# Patient Record
Sex: FEMALE | Race: BLACK OR AFRICAN AMERICAN | NOT HISPANIC OR LATINO | URBAN - METROPOLITAN AREA
[De-identification: names, ages, dates, MRNs, and addresses within clinical notes are randomized per-mention and may not be internally consistent; named-entity substitution may affect disease eponyms.]

---

## 2017-05-23 ENCOUNTER — OUTPATIENT (OUTPATIENT)
Dept: OUTPATIENT SERVICES | Facility: HOSPITAL | Age: 82
LOS: 1 days | End: 2017-05-23
Payer: COMMERCIAL

## 2017-05-23 DIAGNOSIS — Z96.659 PRESENCE OF UNSPECIFIED ARTIFICIAL KNEE JOINT: Chronic | ICD-10-CM

## 2017-05-23 DIAGNOSIS — Z98.51 TUBAL LIGATION STATUS: Chronic | ICD-10-CM

## 2017-05-23 DIAGNOSIS — R06.02 SHORTNESS OF BREATH: ICD-10-CM

## 2017-05-23 PROCEDURE — A9502: CPT

## 2017-05-23 PROCEDURE — 93017 CV STRESS TEST TRACING ONLY: CPT

## 2017-05-23 PROCEDURE — 78452 HT MUSCLE IMAGE SPECT MULT: CPT

## 2018-06-04 ENCOUNTER — APPOINTMENT (OUTPATIENT)
Dept: VASCULAR SURGERY | Facility: CLINIC | Age: 83
End: 2018-06-04
Payer: MEDICARE

## 2018-06-04 VITALS
TEMPERATURE: 98.5 F | HEART RATE: 61 BPM | DIASTOLIC BLOOD PRESSURE: 76 MMHG | WEIGHT: 179 LBS | SYSTOLIC BLOOD PRESSURE: 158 MMHG | BODY MASS INDEX: 30.56 KG/M2 | OXYGEN SATURATION: 98 % | HEIGHT: 64 IN

## 2018-06-04 PROCEDURE — 99202 OFFICE O/P NEW SF 15 MIN: CPT

## 2018-06-04 PROCEDURE — 93970 EXTREMITY STUDY: CPT

## 2018-07-03 ENCOUNTER — RESULT REVIEW (OUTPATIENT)
Age: 83
End: 2018-07-03

## 2018-09-10 ENCOUNTER — APPOINTMENT (OUTPATIENT)
Dept: VASCULAR SURGERY | Facility: CLINIC | Age: 83
End: 2018-09-10
Payer: MEDICARE

## 2018-09-10 VITALS — SYSTOLIC BLOOD PRESSURE: 174 MMHG | DIASTOLIC BLOOD PRESSURE: 77 MMHG

## 2018-09-10 VITALS
HEART RATE: 61 BPM | DIASTOLIC BLOOD PRESSURE: 83 MMHG | HEIGHT: 64 IN | OXYGEN SATURATION: 100 % | BODY MASS INDEX: 30.56 KG/M2 | TEMPERATURE: 97.9 F | WEIGHT: 179 LBS | SYSTOLIC BLOOD PRESSURE: 176 MMHG

## 2018-09-10 PROCEDURE — 99213 OFFICE O/P EST LOW 20 MIN: CPT

## 2018-09-10 PROCEDURE — 93970 EXTREMITY STUDY: CPT

## 2019-03-11 ENCOUNTER — APPOINTMENT (OUTPATIENT)
Dept: VASCULAR SURGERY | Facility: CLINIC | Age: 84
End: 2019-03-11

## 2019-04-08 ENCOUNTER — APPOINTMENT (OUTPATIENT)
Dept: VASCULAR SURGERY | Facility: CLINIC | Age: 84
End: 2019-04-08
Payer: MEDICARE

## 2019-04-08 VITALS
SYSTOLIC BLOOD PRESSURE: 166 MMHG | BODY MASS INDEX: 30.38 KG/M2 | TEMPERATURE: 98.1 F | WEIGHT: 177 LBS | HEART RATE: 77 BPM | DIASTOLIC BLOOD PRESSURE: 75 MMHG

## 2019-04-08 PROCEDURE — 99212 OFFICE O/P EST SF 10 MIN: CPT

## 2019-04-08 PROCEDURE — 93970 EXTREMITY STUDY: CPT

## 2019-07-23 ENCOUNTER — OUTPATIENT (OUTPATIENT)
Dept: OUTPATIENT SERVICES | Facility: HOSPITAL | Age: 84
LOS: 1 days | End: 2019-07-23
Payer: COMMERCIAL

## 2019-07-23 DIAGNOSIS — Z98.51 TUBAL LIGATION STATUS: Chronic | ICD-10-CM

## 2019-07-23 DIAGNOSIS — R06.09 OTHER FORMS OF DYSPNEA: ICD-10-CM

## 2019-07-23 DIAGNOSIS — Z96.659 PRESENCE OF UNSPECIFIED ARTIFICIAL KNEE JOINT: Chronic | ICD-10-CM

## 2019-07-23 PROCEDURE — A9502: CPT

## 2019-07-23 PROCEDURE — 78452 HT MUSCLE IMAGE SPECT MULT: CPT

## 2019-07-23 PROCEDURE — 93017 CV STRESS TEST TRACING ONLY: CPT

## 2020-10-31 ENCOUNTER — INPATIENT (INPATIENT)
Facility: HOSPITAL | Age: 85
LOS: 1 days | Discharge: HOME | DRG: 558 | End: 2020-11-02
Attending: STUDENT IN AN ORGANIZED HEALTH CARE EDUCATION/TRAINING PROGRAM | Admitting: STUDENT IN AN ORGANIZED HEALTH CARE EDUCATION/TRAINING PROGRAM
Payer: COMMERCIAL

## 2020-10-31 VITALS
OXYGEN SATURATION: 98 % | RESPIRATION RATE: 18 BRPM | DIASTOLIC BLOOD PRESSURE: 92 MMHG | TEMPERATURE: 97 F | HEIGHT: 65 IN | SYSTOLIC BLOOD PRESSURE: 174 MMHG | HEART RATE: 83 BPM

## 2020-10-31 DIAGNOSIS — Z98.51 TUBAL LIGATION STATUS: Chronic | ICD-10-CM

## 2020-10-31 DIAGNOSIS — Z96.659 PRESENCE OF UNSPECIFIED ARTIFICIAL KNEE JOINT: Chronic | ICD-10-CM

## 2020-10-31 LAB
ACETONE SERPL-MCNC: NEGATIVE — SIGNIFICANT CHANGE UP
ALBUMIN SERPL ELPH-MCNC: 2.9 G/DL — LOW (ref 3.5–5)
ALP SERPL-CCNC: 54 U/L — SIGNIFICANT CHANGE UP (ref 40–120)
ALT FLD-CCNC: 46 U/L DA — SIGNIFICANT CHANGE UP (ref 10–60)
ANION GAP SERPL CALC-SCNC: 5 MMOL/L — SIGNIFICANT CHANGE UP (ref 5–17)
APTT BLD: 36.9 SEC — HIGH (ref 27.5–35.5)
AST SERPL-CCNC: 51 U/L — HIGH (ref 10–40)
BASOPHILS # BLD AUTO: 0.01 K/UL — SIGNIFICANT CHANGE UP (ref 0–0.2)
BASOPHILS NFR BLD AUTO: 0.2 % — SIGNIFICANT CHANGE UP (ref 0–2)
BILIRUB SERPL-MCNC: 0.2 MG/DL — SIGNIFICANT CHANGE UP (ref 0.2–1.2)
BUN SERPL-MCNC: 24 MG/DL — HIGH (ref 7–18)
CALCIUM SERPL-MCNC: 9.1 MG/DL — SIGNIFICANT CHANGE UP (ref 8.4–10.5)
CHLORIDE SERPL-SCNC: 109 MMOL/L — HIGH (ref 96–108)
CK SERPL-CCNC: 1551 U/L — HIGH (ref 21–215)
CO2 SERPL-SCNC: 29 MMOL/L — SIGNIFICANT CHANGE UP (ref 22–31)
CREAT SERPL-MCNC: 1.06 MG/DL — SIGNIFICANT CHANGE UP (ref 0.5–1.3)
EOSINOPHIL # BLD AUTO: 0.02 K/UL — SIGNIFICANT CHANGE UP (ref 0–0.5)
EOSINOPHIL NFR BLD AUTO: 0.4 % — SIGNIFICANT CHANGE UP (ref 0–6)
GLUCOSE SERPL-MCNC: 144 MG/DL — HIGH (ref 70–99)
HCT VFR BLD CALC: 40.8 % — SIGNIFICANT CHANGE UP (ref 34.5–45)
HGB BLD-MCNC: 12.6 G/DL — SIGNIFICANT CHANGE UP (ref 11.5–15.5)
IMM GRANULOCYTES NFR BLD AUTO: 0.2 % — SIGNIFICANT CHANGE UP (ref 0–1.5)
INR BLD: 2.03 RATIO — HIGH (ref 0.88–1.16)
LYMPHOCYTES # BLD AUTO: 1.13 K/UL — SIGNIFICANT CHANGE UP (ref 1–3.3)
LYMPHOCYTES # BLD AUTO: 20.1 % — SIGNIFICANT CHANGE UP (ref 13–44)
MAGNESIUM SERPL-MCNC: 2.3 MG/DL — SIGNIFICANT CHANGE UP (ref 1.6–2.6)
MCHC RBC-ENTMCNC: 30 PG — SIGNIFICANT CHANGE UP (ref 27–34)
MCHC RBC-ENTMCNC: 30.9 GM/DL — LOW (ref 32–36)
MCV RBC AUTO: 97.1 FL — SIGNIFICANT CHANGE UP (ref 80–100)
MONOCYTES # BLD AUTO: 0.63 K/UL — SIGNIFICANT CHANGE UP (ref 0–0.9)
MONOCYTES NFR BLD AUTO: 11.2 % — SIGNIFICANT CHANGE UP (ref 2–14)
NEUTROPHILS # BLD AUTO: 3.83 K/UL — SIGNIFICANT CHANGE UP (ref 1.8–7.4)
NEUTROPHILS NFR BLD AUTO: 67.9 % — SIGNIFICANT CHANGE UP (ref 43–77)
NRBC # BLD: 0 /100 WBCS — SIGNIFICANT CHANGE UP (ref 0–0)
PLATELET # BLD AUTO: 181 K/UL — SIGNIFICANT CHANGE UP (ref 150–400)
POTASSIUM SERPL-MCNC: 4.2 MMOL/L — SIGNIFICANT CHANGE UP (ref 3.5–5.3)
POTASSIUM SERPL-SCNC: 4.2 MMOL/L — SIGNIFICANT CHANGE UP (ref 3.5–5.3)
PROT SERPL-MCNC: 6.9 G/DL — SIGNIFICANT CHANGE UP (ref 6–8.3)
PROTHROM AB SERPL-ACNC: 23.4 SEC — HIGH (ref 10.6–13.6)
RBC # BLD: 4.2 M/UL — SIGNIFICANT CHANGE UP (ref 3.8–5.2)
RBC # FLD: 13.6 % — SIGNIFICANT CHANGE UP (ref 10.3–14.5)
SODIUM SERPL-SCNC: 143 MMOL/L — SIGNIFICANT CHANGE UP (ref 135–145)
TROPONIN I SERPL-MCNC: 0.03 NG/ML — SIGNIFICANT CHANGE UP (ref 0–0.04)
WBC # BLD: 5.63 K/UL — SIGNIFICANT CHANGE UP (ref 3.8–10.5)
WBC # FLD AUTO: 5.63 K/UL — SIGNIFICANT CHANGE UP (ref 3.8–10.5)

## 2020-10-31 PROCEDURE — 74177 CT ABD & PELVIS W/CONTRAST: CPT | Mod: 26

## 2020-10-31 PROCEDURE — 73030 X-RAY EXAM OF SHOULDER: CPT | Mod: 26,LT

## 2020-10-31 PROCEDURE — 71260 CT THORAX DX C+: CPT | Mod: 26

## 2020-10-31 PROCEDURE — 72125 CT NECK SPINE W/O DYE: CPT | Mod: 26

## 2020-10-31 PROCEDURE — 70450 CT HEAD/BRAIN W/O DYE: CPT | Mod: 26

## 2020-10-31 RX ORDER — SODIUM CHLORIDE 9 MG/ML
1000 INJECTION INTRAMUSCULAR; INTRAVENOUS; SUBCUTANEOUS
Refills: 0 | Status: DISCONTINUED | OUTPATIENT
Start: 2020-10-31 | End: 2020-11-01

## 2020-10-31 RX ORDER — MORPHINE SULFATE 50 MG/1
2 CAPSULE, EXTENDED RELEASE ORAL ONCE
Refills: 0 | Status: DISCONTINUED | OUTPATIENT
Start: 2020-10-31 | End: 2020-10-31

## 2020-10-31 RX ORDER — SODIUM CHLORIDE 9 MG/ML
125 INJECTION INTRAMUSCULAR; INTRAVENOUS; SUBCUTANEOUS ONCE
Refills: 0 | Status: DISCONTINUED | OUTPATIENT
Start: 2020-10-31 | End: 2020-10-31

## 2020-10-31 RX ORDER — ONDANSETRON 8 MG/1
4 TABLET, FILM COATED ORAL ONCE
Refills: 0 | Status: COMPLETED | OUTPATIENT
Start: 2020-10-31 | End: 2020-10-31

## 2020-10-31 RX ADMIN — MORPHINE SULFATE 2 MILLIGRAM(S): 50 CAPSULE, EXTENDED RELEASE ORAL at 22:45

## 2020-10-31 RX ADMIN — MORPHINE SULFATE 2 MILLIGRAM(S): 50 CAPSULE, EXTENDED RELEASE ORAL at 22:23

## 2020-10-31 RX ADMIN — ONDANSETRON 4 MILLIGRAM(S): 8 TABLET, FILM COATED ORAL at 22:23

## 2020-10-31 NOTE — ED PROVIDER NOTE - OBJECTIVE STATEMENT
85 y.o female with a PMhx of DM, HTN, DVT on coumadin and a PSHx of a knee replacement reports to the ED s/p fall. Patient endorses she was sitting on her bed eating and when she went to get up she lost her balance and fell on her L side. Patient endorses pain to the L side of her back and neck . Patient endorses she cannot raise her L arm. Patient states she was able to walk with her bucio after the fall. Patient denies any preceding symptoms, dizzines sor any other acute complaints. NKDA 85 y.o female with a PMhx of DM, HTN, DVT on coumadin and a PSHx of a knee replacement reports to the ED s/p fall. Patient endorses she was sitting on her bed eating and when she went to get up she lost her balance and fell on her L side. Patient endorses pain to the L side of her back and neck . Patient endorses she cannot raise her L arm. Patient states she was able to walk with her bucio after the fall. Patient denies any preceding symptoms, dizziness, or any other acute complaints. NKDA

## 2020-10-31 NOTE — ED PROVIDER NOTE - CLINICAL SUMMARY MEDICAL DECISION MAKING FREE TEXT BOX
Patient s/p losing balance and falling now c.o left sided pain to back and neck, since patient is on coumadin, will get labs, ct and reassess Patient s/p losing balance and falling now c.o left sided pain to back and neck, abd pain, hip pain since patient is on coumadin, will get labs, ct and reassess

## 2020-10-31 NOTE — ED PROVIDER NOTE - CARE PLAN
Principal Discharge DX:	Rhabdomyolysis  Secondary Diagnosis:	Fall at home  Secondary Diagnosis:	Neck pain   Principal Discharge DX:	Rhabdomyolysis  Secondary Diagnosis:	Fall at home  Secondary Diagnosis:	Neck pain  Secondary Diagnosis:	Thyroid nodule  Secondary Diagnosis:	Renal lesion

## 2020-10-31 NOTE — ED PROVIDER NOTE - MUSCULOSKELETAL, MLM
Spine appears normal, range of motion is not limited, no muscle or joint tenderness Spine appears normal, Lt shoulder-range of motion is limited, tenderness to palp., Lt hip- tenderness, no deformity, no shortening, pulses intact

## 2020-11-01 DIAGNOSIS — M62.82 RHABDOMYOLYSIS: ICD-10-CM

## 2020-11-01 DIAGNOSIS — I82.409 ACUTE EMBOLISM AND THROMBOSIS OF UNSPECIFIED DEEP VEINS OF UNSPECIFIED LOWER EXTREMITY: ICD-10-CM
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
## 2020-11-01 DIAGNOSIS — Z29.9 ENCOUNTER FOR PROPHYLACTIC MEASURES, UNSPECIFIED: ICD-10-CM

## 2020-11-01 DIAGNOSIS — N28.9 DISORDER OF KIDNEY AND URETER, UNSPECIFIED: ICD-10-CM

## 2020-11-01 DIAGNOSIS — I10 ESSENTIAL (PRIMARY) HYPERTENSION: ICD-10-CM

## 2020-11-01 DIAGNOSIS — E04.1 NONTOXIC SINGLE THYROID NODULE: ICD-10-CM

## 2020-11-01 DIAGNOSIS — E11.9 TYPE 2 DIABETES MELLITUS WITHOUT COMPLICATIONS: ICD-10-CM

## 2020-11-01 LAB
A1C WITH ESTIMATED AVERAGE GLUCOSE RESULT: 6.1 % — HIGH (ref 4–5.6)
ALBUMIN SERPL ELPH-MCNC: 2.9 G/DL — LOW (ref 3.5–5)
ALP SERPL-CCNC: 52 U/L — SIGNIFICANT CHANGE UP (ref 40–120)
ALT FLD-CCNC: 41 U/L DA — SIGNIFICANT CHANGE UP (ref 10–60)
ANION GAP SERPL CALC-SCNC: 3 MMOL/L — LOW (ref 5–17)
APPEARANCE UR: CLEAR — SIGNIFICANT CHANGE UP
AST SERPL-CCNC: 37 U/L — SIGNIFICANT CHANGE UP (ref 10–40)
BACTERIA # UR AUTO: ABNORMAL /HPF
BASOPHILS # BLD AUTO: 0.01 K/UL — SIGNIFICANT CHANGE UP (ref 0–0.2)
BASOPHILS NFR BLD AUTO: 0.2 % — SIGNIFICANT CHANGE UP (ref 0–2)
BILIRUB SERPL-MCNC: 0.5 MG/DL — SIGNIFICANT CHANGE UP (ref 0.2–1.2)
BILIRUB UR-MCNC: NEGATIVE — SIGNIFICANT CHANGE UP
BUN SERPL-MCNC: 22 MG/DL — HIGH (ref 7–18)
CALCIUM SERPL-MCNC: 8.9 MG/DL — SIGNIFICANT CHANGE UP (ref 8.4–10.5)
CHLORIDE SERPL-SCNC: 112 MMOL/L — HIGH (ref 96–108)
CHOLEST SERPL-MCNC: 113 MG/DL — SIGNIFICANT CHANGE UP
CK SERPL-CCNC: 887 U/L — HIGH (ref 21–215)
CO2 SERPL-SCNC: 29 MMOL/L — SIGNIFICANT CHANGE UP (ref 22–31)
COLOR SPEC: YELLOW — SIGNIFICANT CHANGE UP
CREAT SERPL-MCNC: 1.1 MG/DL — SIGNIFICANT CHANGE UP (ref 0.5–1.3)
DIFF PNL FLD: ABNORMAL
EOSINOPHIL # BLD AUTO: 0.02 K/UL — SIGNIFICANT CHANGE UP (ref 0–0.5)
EOSINOPHIL NFR BLD AUTO: 0.3 % — SIGNIFICANT CHANGE UP (ref 0–6)
EPI CELLS # UR: SIGNIFICANT CHANGE UP /HPF
ESTIMATED AVERAGE GLUCOSE: 128 MG/DL — HIGH (ref 68–114)
GLUCOSE BLDC GLUCOMTR-MCNC: 104 MG/DL — HIGH (ref 70–99)
GLUCOSE BLDC GLUCOMTR-MCNC: 110 MG/DL — HIGH (ref 70–99)
GLUCOSE BLDC GLUCOMTR-MCNC: 142 MG/DL — HIGH (ref 70–99)
GLUCOSE BLDC GLUCOMTR-MCNC: 94 MG/DL — SIGNIFICANT CHANGE UP (ref 70–99)
GLUCOSE SERPL-MCNC: 104 MG/DL — HIGH (ref 70–99)
GLUCOSE UR QL: NEGATIVE — SIGNIFICANT CHANGE UP
HCT VFR BLD CALC: 39.5 % — SIGNIFICANT CHANGE UP (ref 34.5–45)
HDLC SERPL-MCNC: 51 MG/DL — SIGNIFICANT CHANGE UP
HGB BLD-MCNC: 12.3 G/DL — SIGNIFICANT CHANGE UP (ref 11.5–15.5)
IMM GRANULOCYTES NFR BLD AUTO: 0.2 % — SIGNIFICANT CHANGE UP (ref 0–1.5)
KETONES UR-MCNC: NEGATIVE — SIGNIFICANT CHANGE UP
LEUKOCYTE ESTERASE UR-ACNC: NEGATIVE — SIGNIFICANT CHANGE UP
LIPID PNL WITH DIRECT LDL SERPL: 53 MG/DL — SIGNIFICANT CHANGE UP
LYMPHOCYTES # BLD AUTO: 1 K/UL — SIGNIFICANT CHANGE UP (ref 1–3.3)
LYMPHOCYTES # BLD AUTO: 16 % — SIGNIFICANT CHANGE UP (ref 13–44)
MAGNESIUM SERPL-MCNC: 2 MG/DL — SIGNIFICANT CHANGE UP (ref 1.6–2.6)
MCHC RBC-ENTMCNC: 30.8 PG — SIGNIFICANT CHANGE UP (ref 27–34)
MCHC RBC-ENTMCNC: 31.1 GM/DL — LOW (ref 32–36)
MCV RBC AUTO: 98.8 FL — SIGNIFICANT CHANGE UP (ref 80–100)
MONOCYTES # BLD AUTO: 0.68 K/UL — SIGNIFICANT CHANGE UP (ref 0–0.9)
MONOCYTES NFR BLD AUTO: 10.9 % — SIGNIFICANT CHANGE UP (ref 2–14)
NEUTROPHILS # BLD AUTO: 4.53 K/UL — SIGNIFICANT CHANGE UP (ref 1.8–7.4)
NEUTROPHILS NFR BLD AUTO: 72.4 % — SIGNIFICANT CHANGE UP (ref 43–77)
NITRITE UR-MCNC: NEGATIVE — SIGNIFICANT CHANGE UP
NON HDL CHOLESTEROL: 62 MG/DL — SIGNIFICANT CHANGE UP
NRBC # BLD: 0 /100 WBCS — SIGNIFICANT CHANGE UP (ref 0–0)
PH UR: 5 — SIGNIFICANT CHANGE UP (ref 5–8)
PHOSPHATE SERPL-MCNC: 3.1 MG/DL — SIGNIFICANT CHANGE UP (ref 2.5–4.5)
PLATELET # BLD AUTO: 166 K/UL — SIGNIFICANT CHANGE UP (ref 150–400)
POTASSIUM SERPL-MCNC: 4.2 MMOL/L — SIGNIFICANT CHANGE UP (ref 3.5–5.3)
POTASSIUM SERPL-SCNC: 4.2 MMOL/L — SIGNIFICANT CHANGE UP (ref 3.5–5.3)
PROT SERPL-MCNC: 6.8 G/DL — SIGNIFICANT CHANGE UP (ref 6–8.3)
PROT UR-MCNC: NEGATIVE — SIGNIFICANT CHANGE UP
RBC # BLD: 4 M/UL — SIGNIFICANT CHANGE UP (ref 3.8–5.2)
RBC # FLD: 13.8 % — SIGNIFICANT CHANGE UP (ref 10.3–14.5)
RBC CASTS # UR COMP ASSIST: SIGNIFICANT CHANGE UP /HPF (ref 0–2)
SARS-COV-2 RNA SPEC QL NAA+PROBE: SIGNIFICANT CHANGE UP
SODIUM SERPL-SCNC: 144 MMOL/L — SIGNIFICANT CHANGE UP (ref 135–145)
SP GR SPEC: 1.01 — SIGNIFICANT CHANGE UP (ref 1.01–1.02)
T3FREE SERPL-MCNC: 2.49 PG/ML — SIGNIFICANT CHANGE UP (ref 1.8–4.6)
TRIGL SERPL-MCNC: 45 MG/DL — SIGNIFICANT CHANGE UP
TROPONIN I SERPL-MCNC: 0.02 NG/ML — SIGNIFICANT CHANGE UP (ref 0–0.04)
TSH SERPL-MCNC: 1.2 UU/ML — SIGNIFICANT CHANGE UP (ref 0.34–4.82)
UROBILINOGEN FLD QL: NEGATIVE — SIGNIFICANT CHANGE UP
WBC # BLD: 6.25 K/UL — SIGNIFICANT CHANGE UP (ref 3.8–10.5)
WBC # FLD AUTO: 6.25 K/UL — SIGNIFICANT CHANGE UP (ref 3.8–10.5)
WBC UR QL: SIGNIFICANT CHANGE UP /HPF (ref 0–5)

## 2020-11-01 PROCEDURE — 99285 EMERGENCY DEPT VISIT HI MDM: CPT

## 2020-11-01 PROCEDURE — 99222 1ST HOSP IP/OBS MODERATE 55: CPT

## 2020-11-01 PROCEDURE — 93880 EXTRACRANIAL BILAT STUDY: CPT | Mod: 26

## 2020-11-01 PROCEDURE — 72170 X-RAY EXAM OF PELVIS: CPT | Mod: 26

## 2020-11-01 RX ORDER — SODIUM CHLORIDE 9 MG/ML
1000 INJECTION INTRAMUSCULAR; INTRAVENOUS; SUBCUTANEOUS
Refills: 0 | Status: DISCONTINUED | OUTPATIENT
Start: 2020-11-01 | End: 2020-11-02

## 2020-11-01 RX ORDER — INSULIN LISPRO 100/ML
VIAL (ML) SUBCUTANEOUS
Refills: 0 | Status: DISCONTINUED | OUTPATIENT
Start: 2020-11-01 | End: 2020-11-02

## 2020-11-01 RX ORDER — HEPARIN SODIUM 5000 [USP'U]/ML
5000 INJECTION INTRAVENOUS; SUBCUTANEOUS EVERY 12 HOURS
Refills: 0 | Status: DISCONTINUED | OUTPATIENT
Start: 2020-11-01 | End: 2020-11-02

## 2020-11-01 RX ORDER — INFLUENZA VIRUS VACCINE 15; 15; 15; 15 UG/.5ML; UG/.5ML; UG/.5ML; UG/.5ML
0.5 SUSPENSION INTRAMUSCULAR ONCE
Refills: 0 | Status: DISCONTINUED | OUTPATIENT
Start: 2020-11-01 | End: 2020-11-02

## 2020-11-01 RX ORDER — WARFARIN SODIUM 2.5 MG/1
4 TABLET ORAL ONCE
Refills: 0 | Status: COMPLETED | OUTPATIENT
Start: 2020-11-01 | End: 2020-11-01

## 2020-11-01 RX ORDER — PANTOPRAZOLE SODIUM 20 MG/1
40 TABLET, DELAYED RELEASE ORAL
Refills: 0 | Status: DISCONTINUED | OUTPATIENT
Start: 2020-11-01 | End: 2020-11-02

## 2020-11-01 RX ORDER — AMLODIPINE BESYLATE 2.5 MG/1
5 TABLET ORAL DAILY
Refills: 0 | Status: DISCONTINUED | OUTPATIENT
Start: 2020-11-01 | End: 2020-11-02

## 2020-11-01 RX ORDER — ACETAMINOPHEN 500 MG
650 TABLET ORAL ONCE
Refills: 0 | Status: COMPLETED | OUTPATIENT
Start: 2020-11-01 | End: 2020-11-01

## 2020-11-01 RX ADMIN — WARFARIN SODIUM 4 MILLIGRAM(S): 2.5 TABLET ORAL at 21:05

## 2020-11-01 RX ADMIN — AMLODIPINE BESYLATE 5 MILLIGRAM(S): 2.5 TABLET ORAL at 08:03

## 2020-11-01 RX ADMIN — Medication 650 MILLIGRAM(S): at 03:32

## 2020-11-01 RX ADMIN — HEPARIN SODIUM 5000 UNIT(S): 5000 INJECTION INTRAVENOUS; SUBCUTANEOUS at 08:03

## 2020-11-01 RX ADMIN — PANTOPRAZOLE SODIUM 40 MILLIGRAM(S): 20 TABLET, DELAYED RELEASE ORAL at 08:03

## 2020-11-01 RX ADMIN — HEPARIN SODIUM 5000 UNIT(S): 5000 INJECTION INTRAVENOUS; SUBCUTANEOUS at 17:49

## 2020-11-01 RX ADMIN — Medication 650 MILLIGRAM(S): at 04:02

## 2020-11-01 RX ADMIN — SODIUM CHLORIDE 80 MILLILITER(S): 9 INJECTION INTRAMUSCULAR; INTRAVENOUS; SUBCUTANEOUS at 18:03

## 2020-11-01 RX ADMIN — SODIUM CHLORIDE 150 MILLILITER(S): 9 INJECTION INTRAMUSCULAR; INTRAVENOUS; SUBCUTANEOUS at 01:35

## 2020-11-01 NOTE — ED ADULT NURSE NOTE - NS ED NURSE LEVEL OF CONSCIOUSNESS ORIENTATION
Oriented - self; Oriented - place; Oriented - time Area H Indication Text: Tumors in this location are included in Area H (eyelids, eyebrows, nose, lips, chin, ear, pre-auricular, post-auricular, temple, genitalia, hands, feet, ankles and areola).  Tissue conservation is critical in these anatomic locations.

## 2020-11-01 NOTE — H&P ADULT - NSICDXPASTMEDICALHX_GEN_ALL_CORE_FT
PAST MEDICAL HISTORY:  Arthritis     Diabetes     DVT (deep venous thrombosis)     Hypertension

## 2020-11-01 NOTE — H&P ADULT - NSHPSOCIALHISTORY_GEN_ALL_CORE
Pt stays at home with daughter and son   Pt denies any smoking, alcohol or any form of substance abuse.

## 2020-11-01 NOTE — H&P ADULT - ATTENDING COMMENTS
Pt seen and examined  Case discussed with MAR    85 year old woman with PMH of DM2, HTN, DVT on coumadin presented to the ED with a fall from a sitting position and landed on her left side, back and neck. There was no preceding chest pain, dizziness and no LOC.    Vital Signs Last 24 Hrs  T(C): 36.9 (01 Nov 2020 01:02), Max: 36.9 (01 Nov 2020 01:02)  T(F): 98.4 (01 Nov 2020 01:02), Max: 98.4 (01 Nov 2020 01:02)  HR: 81 (01 Nov 2020 01:02) (81 - 83)  BP: 162/65 (01 Nov 2020 01:02) (162/65 - 174/92)  RR: 18 (01 Nov 2020 01:02) (18 - 18)  SpO2: 96% (01 Nov 2020 01:02) (96% - 98%)      Labs                        12.6   5.63  )-----------( 181      ( 31 Oct 2020 22:20 )             40.8     10-31    143  |  109  |  24  ----------------------<  144<H>  4.2   |  29  |  1.06    Ca    9.1      31 Oct 2020 22:20  Mg     2.3     10-31  TPro  6.9  /  Alb  2.9<L>  /  TBili  0.2  /  DBili  x   /  AST  51<H>  /  ALT  46  /  AlkPhos  54  10-31    PT/INR - ( 31 Oct 2020 22:20 )   PT: 23.4 sec;   INR: 2.03 ratio    PTT - ( 31 Oct 2020 22:20 )  PTT:36.9 sec    CK - 1551  trop - 0.027    CT abdomen / Head/C-spine/chest -- no acute findings  incidental  renal ( left ) lesion    Impression  85 year old woman with hx as above post fall at home. Pt seen and examined  Case discussed with MAR    85 year old woman with PMH of DM2, HTN, DVT on coumadin presented to the ED with a fall from a sitting position and landed on her left side, back and neck. There was no preceding chest pain, dizziness and no LOC.    Vital Signs Last 24 Hrs  T(C): 36.9 (01 Nov 2020 01:02), Max: 36.9 (01 Nov 2020 01:02)  T(F): 98.4 (01 Nov 2020 01:02), Max: 98.4 (01 Nov 2020 01:02)  HR: 81 (01 Nov 2020 01:02) (81 - 83)  BP: 162/65 (01 Nov 2020 01:02) (162/65 - 174/92)  RR: 18 (01 Nov 2020 01:02) (18 - 18)  SpO2: 96% (01 Nov 2020 01:02) (96% - 98%)      Labs                        12.6   5.63  )-----------( 181      ( 31 Oct 2020 22:20 )             40.8     10-31    143  |  109  |  24  ----------------------<  144<H>  4.2   |  29  |  1.06    Ca    9.1      31 Oct 2020 22:20  Mg     2.3     10-31  TPro  6.9  /  Alb  2.9<L>  /  TBili  0.2  /  DBili  x   /  AST  51<H>  /  ALT  46  /  AlkPhos  54  10-31    PT/INR - ( 31 Oct 2020 22:20 )   PT: 23.4 sec;   INR: 2.03 ratio    PTT - ( 31 Oct 2020 22:20 )  PTT:36.9 sec    CK - 1551  trop - 0.027    CT abdomen / Head/C-spine/chest -- no acute findings  incidental  renal ( left ) lesion    - Impression  85 year old woman with hx as above post fall at home. No evidence of a fracture but elevated CK and renal indices.  Will admit for IV hydration. Will have social work/ case mgt consult for safe home discharge vs SNF.    A/P  - CHARI   Likely pre-renal   Admit to Medicine  IV hydration with isotonic fluids  Check chemistry in AM    - Mild rhabdomyolysis  IVF hydration   serial CK levels     - Safe home discharge     - Code status Pt seen and examined  Case discussed with MAR    85 year old woman with PMH of DM2, HTN, DVT on coumadin presented to the ED with a fall from a sitting position and landed on her left side, back and neck. There was no preceding chest pain, dizziness and no LOC.    Vital Signs Last 24 Hrs  T(C): 36.9 (01 Nov 2020 01:02), Max: 36.9 (01 Nov 2020 01:02)  T(F): 98.4 (01 Nov 2020 01:02), Max: 98.4 (01 Nov 2020 01:02)  HR: 81 (01 Nov 2020 01:02) (81 - 83)  BP: 162/65 (01 Nov 2020 01:02) (162/65 - 174/92)  RR: 18 (01 Nov 2020 01:02) (18 - 18)  SpO2: 96% (01 Nov 2020 01:02) (96% - 98%)    Exam  Elderly woman, calm lying in bed, NAD AAO X 3  Exam unremarkable except for tenderness in right inner thigh      Labs                        12.6   5.63  )-----------( 181      ( 31 Oct 2020 22:20 )             40.8     10-31    143  |  109  |  24  ----------------------<  144<H>  4.2   |  29  |  1.06    Ca    9.1      31 Oct 2020 22:20  Mg     2.3     10-31  TPro  6.9  /  Alb  2.9<L>  /  TBili  0.2  /  DBili  x   /  AST  51<H>  /  ALT  46  /  AlkPhos  54  10-31    PT/INR - ( 31 Oct 2020 22:20 )   PT: 23.4 sec;   INR: 2.03 ratio    PTT - ( 31 Oct 2020 22:20 )  PTT:36.9 sec    CK - 1551  trop - 0.027    CT abdomen / Head/C-spine/chest -- no acute findings  incidental  renal ( left ) lesion    - Impression  85 year old woman with hx as above post fall at home. No evidence of a fracture but elevated CK and renal indices.  Will admit for IV hydration. Mildly elevated troponin may be related to the elevated CK.  A/P  - CHARI   Likely pre-renal   Admit to Medicine  IV hydration with isotonic fluids  Check chemistry in AM    - Mild rhabdomyolysis  IVF hydration   serial CK levels     - Incidental renal mass finding  Would need non urgent imaging in the outpatient setting.    - Code status  Full code

## 2020-11-01 NOTE — H&P ADULT - MOTOR
Pt not able to raise B/L LE due to severe back pain, but able to wiggle toes and moves legs sideways

## 2020-11-01 NOTE — H&P ADULT - PROBLEM SELECTOR PLAN 7
Pt has PMH of DVT   On home coumadin ( dose not known)  reinstate home meds after confirmation   INR 2.03  F/U PT/INR daily

## 2020-11-01 NOTE — ED ADULT NURSE NOTE - OBJECTIVE STATEMENT
Pt stated she was sitting up in bed eating  when she got up she tripped and fell, hit her left side against dresser, denies hitting head or loc.

## 2020-11-01 NOTE — PHYSICAL THERAPY INITIAL EVALUATION ADULT - CRITERIA FOR SKILLED THERAPEUTIC INTERVENTIONS
anticipated discharge recommendation/impairments found/therapy frequency/predicted duration of therapy intervention/anticipated equipment needs at discharge/rehab potential/risk reduction/prevention/functional limitations in following categories

## 2020-11-01 NOTE — H&P ADULT - NSHPLABSRESULTS_GEN_ALL_CORE
12.6   5.63  )-----------( 181      ( 31 Oct 2020 22:20 )             40.8       10-31    143  |  109<H>  |  24<H>  ----------------------------<  144<H>  4.2   |  29  |  1.06    Ca    9.1      31 Oct 2020 22:20  Mg     2.3     10-31    TPro  6.9  /  Alb  2.9<L>  /  TBili  0.2  /  DBili  x   /  AST  51<H>  /  ALT  46  /  AlkPhos  54  10-31                  PT/INR - ( 31 Oct 2020 22:20 )   PT: 23.4 sec;   INR: 2.03 ratio         PTT - ( 31 Oct 2020 22:20 )  PTT:36.9 sec    Lactate Trend      CARDIAC MARKERS ( 31 Oct 2020 22:20 )  0.027 ng/mL / x     / 1551 U/L / x     / x            CAPILLARY BLOOD GLUCOSE        < from: CT Abdomen and Pelvis w/ IV Cont (10.31.20 @ 23:51) >    MPRESSION:    No evidence of acute traumatic injury to the chest, abdomen, or pelvis.  3 cm indeterminate left interpolar renal lesion may be further characterized with ultrasound or MRI.    < end of copied text >    < from: CT Head No Cont (10.31.20 @ 23:50) >    IMPRESSION:    BRAIN: No intracranial hemorrhage, mass effect or depressed skull fracture.  CERVICAL SPINE: No acute displaced fracture or traumatic malalignment. Old fractures of right anterior and posterior arches of C1.    < end of copied text >

## 2020-11-01 NOTE — H&P ADULT - NSHPPHYSICALEXAM_GEN_ALL_CORE
Vital Signs (24 Hrs):  T(C): 36.9 (11-01-20 @ 01:02), Max: 36.9 (11-01-20 @ 01:02)  HR: 81 (11-01-20 @ 01:02) (81 - 83)  BP: 162/65 (11-01-20 @ 01:02) (162/65 - 174/92)  RR: 18 (11-01-20 @ 01:02) (18 - 18)  SpO2: 96% (11-01-20 @ 01:02) (96% - 98%)  Wt(kg): --  Daily Height in cm: 165.1 (31 Oct 2020 20:21)    Daily     I&O's Summary

## 2020-11-01 NOTE — H&P ADULT - PROBLEM SELECTOR PLAN 1
Pt admitted for [facility] fall at home.  Pt c/o lower back pain, L shoulder pain, L hip pain and neck pian   Pt denies any head trauma  CT head n spine- no acute changes, old C1 fractures  c/w Pain meds  F/U PT   F/U ECHO, carotid

## 2020-11-01 NOTE — H&P ADULT - PROBLEM SELECTOR PLAN 8
IMPROVE VTE Individual Risk Assessment    RISK                                                          Points  [] Previous VTE                                           3  [] Thrombophilia                                        2  [] Lower limb paralysis                              2   [] Current Cancer                                       2   [x] Immobilization > 24 hrs                        1  [x]x ICU/CCU stay > 24 hours                       1  [x] Age > 60                                                   1    IMPROVE VTE Score: 3  lovenox ppi

## 2020-11-01 NOTE — H&P ADULT - HISTORY OF PRESENT ILLNESS
85 female, from home, walks with a cane  with a PMhx of DM, HTN, DVT ( coumadin) and a PSHx of a knee replacement reports to the ED s/p fall. Pt was in her usual state of health until this morning when she accidentally slipped after geeting up from her chair on finishing her Breakfast. As per the patient she lost her balance  and fell on her L side. Patient endorses pain to the L side of her back and neck . Patient endorses she cannot raise her L arm and has difficulty to raise legs.  Daughter who was home helped her get up. Patient states she was able to walk with her bucio after the fall. Pt denies any prior falls. Patient denies any preceding symptoms, dizziness, chest pain, palpitations, head trauma, N/V.   Pt denies any smoking, alcohol or any form of substance abuse.     Pt in the ED,  aaox3, Mild distress due to pain.   Vitals- 162/62, Hr 85, afebrile   EKG - Sinus arrythmia  CT head no acute changes   Ct spine- old fractures at C1  Ct abd- 3cm L renal mass  CK 1500    Pt does not remember home meds  Primary team to confirm meds list with daughter Fany- [phone]

## 2020-11-01 NOTE — H&P ADULT - PROBLEM SELECTOR PLAN 3
CT CHEST Bilateral hypodense nodules in the thyroid gland measure up to 1.4 cm on the left.  f/U TSH in am  Pt thinks ? she has Thyroid issues from before

## 2020-11-01 NOTE — H&P ADULT - ASSESSMENT
85 female, from home, walks with a cane  with a PMhx of DM, HTN, DVT ( coumadin) and a PSHx of a knee replacement reports to the ED s/p fall.       Pt in the ED,  aaox3, Mild distress due to pain.   Vitals- 162/62, Hr 85, afebrile   EKG - Sinus arrythmia  CT head no acute changes   Ct spine- old fractures at C1  Ct abd- 3cm L renal mass  CK 1500

## 2020-11-01 NOTE — H&P ADULT - PROBLEM SELECTOR PLAN 6
Pt has PMH of HTN   / 62  PRIMARY TEAM TO CONFIRM HOME MEDS WITH DAUGHTER   starting on amlodipine 5mg od  Monitor vitals

## 2020-11-02 ENCOUNTER — TRANSCRIPTION ENCOUNTER (OUTPATIENT)
Age: 85
End: 2020-11-02

## 2020-11-02 VITALS
TEMPERATURE: 98 F | SYSTOLIC BLOOD PRESSURE: 158 MMHG | RESPIRATION RATE: 17 BRPM | DIASTOLIC BLOOD PRESSURE: 71 MMHG | OXYGEN SATURATION: 100 % | HEART RATE: 78 BPM

## 2020-11-02 LAB
A1C WITH ESTIMATED AVERAGE GLUCOSE RESULT: 6.1 % — HIGH (ref 4–5.6)
ALBUMIN SERPL ELPH-MCNC: 2.5 G/DL — LOW (ref 3.5–5)
ALP SERPL-CCNC: 48 U/L — SIGNIFICANT CHANGE UP (ref 40–120)
ALT FLD-CCNC: 35 U/L DA — SIGNIFICANT CHANGE UP (ref 10–60)
ANION GAP SERPL CALC-SCNC: 4 MMOL/L — LOW (ref 5–17)
APTT BLD: 32.5 SEC — SIGNIFICANT CHANGE UP (ref 27.5–35.5)
AST SERPL-CCNC: 24 U/L — SIGNIFICANT CHANGE UP (ref 10–40)
BASOPHILS # BLD AUTO: 0 K/UL — SIGNIFICANT CHANGE UP (ref 0–0.2)
BASOPHILS NFR BLD AUTO: 0 % — SIGNIFICANT CHANGE UP (ref 0–2)
BILIRUB SERPL-MCNC: 0.3 MG/DL — SIGNIFICANT CHANGE UP (ref 0.2–1.2)
BUN SERPL-MCNC: 22 MG/DL — HIGH (ref 7–18)
CALCIUM SERPL-MCNC: 8.6 MG/DL — SIGNIFICANT CHANGE UP (ref 8.4–10.5)
CHLORIDE SERPL-SCNC: 111 MMOL/L — HIGH (ref 96–108)
CK SERPL-CCNC: 413 U/L — HIGH (ref 21–215)
CO2 SERPL-SCNC: 28 MMOL/L — SIGNIFICANT CHANGE UP (ref 22–31)
CREAT SERPL-MCNC: 1.18 MG/DL — SIGNIFICANT CHANGE UP (ref 0.5–1.3)
EOSINOPHIL # BLD AUTO: 0.06 K/UL — SIGNIFICANT CHANGE UP (ref 0–0.5)
EOSINOPHIL NFR BLD AUTO: 1.3 % — SIGNIFICANT CHANGE UP (ref 0–6)
ESTIMATED AVERAGE GLUCOSE: 128 MG/DL — HIGH (ref 68–114)
GLUCOSE BLDC GLUCOMTR-MCNC: 118 MG/DL — HIGH (ref 70–99)
GLUCOSE BLDC GLUCOMTR-MCNC: 72 MG/DL — SIGNIFICANT CHANGE UP (ref 70–99)
GLUCOSE BLDC GLUCOMTR-MCNC: 92 MG/DL — SIGNIFICANT CHANGE UP (ref 70–99)
GLUCOSE SERPL-MCNC: 96 MG/DL — SIGNIFICANT CHANGE UP (ref 70–99)
HCT VFR BLD CALC: 37.6 % — SIGNIFICANT CHANGE UP (ref 34.5–45)
HGB BLD-MCNC: 11.8 G/DL — SIGNIFICANT CHANGE UP (ref 11.5–15.5)
IMM GRANULOCYTES NFR BLD AUTO: 0.2 % — SIGNIFICANT CHANGE UP (ref 0–1.5)
INR BLD: 1.92 RATIO — HIGH (ref 0.88–1.16)
LYMPHOCYTES # BLD AUTO: 1.02 K/UL — SIGNIFICANT CHANGE UP (ref 1–3.3)
LYMPHOCYTES # BLD AUTO: 21.3 % — SIGNIFICANT CHANGE UP (ref 13–44)
MAGNESIUM SERPL-MCNC: 2.1 MG/DL — SIGNIFICANT CHANGE UP (ref 1.6–2.6)
MCHC RBC-ENTMCNC: 30.7 PG — SIGNIFICANT CHANGE UP (ref 27–34)
MCHC RBC-ENTMCNC: 31.4 GM/DL — LOW (ref 32–36)
MCV RBC AUTO: 97.9 FL — SIGNIFICANT CHANGE UP (ref 80–100)
MONOCYTES # BLD AUTO: 0.68 K/UL — SIGNIFICANT CHANGE UP (ref 0–0.9)
MONOCYTES NFR BLD AUTO: 14.2 % — HIGH (ref 2–14)
NEUTROPHILS # BLD AUTO: 3.01 K/UL — SIGNIFICANT CHANGE UP (ref 1.8–7.4)
NEUTROPHILS NFR BLD AUTO: 63 % — SIGNIFICANT CHANGE UP (ref 43–77)
NRBC # BLD: 0 /100 WBCS — SIGNIFICANT CHANGE UP (ref 0–0)
PHOSPHATE SERPL-MCNC: 2.6 MG/DL — SIGNIFICANT CHANGE UP (ref 2.5–4.5)
PLATELET # BLD AUTO: 156 K/UL — SIGNIFICANT CHANGE UP (ref 150–400)
POTASSIUM SERPL-MCNC: 4 MMOL/L — SIGNIFICANT CHANGE UP (ref 3.5–5.3)
POTASSIUM SERPL-SCNC: 4 MMOL/L — SIGNIFICANT CHANGE UP (ref 3.5–5.3)
PROT SERPL-MCNC: 6.4 G/DL — SIGNIFICANT CHANGE UP (ref 6–8.3)
PROTHROM AB SERPL-ACNC: 22.2 SEC — HIGH (ref 10.6–13.6)
RBC # BLD: 3.84 M/UL — SIGNIFICANT CHANGE UP (ref 3.8–5.2)
RBC # FLD: 14 % — SIGNIFICANT CHANGE UP (ref 10.3–14.5)
SODIUM SERPL-SCNC: 143 MMOL/L — SIGNIFICANT CHANGE UP (ref 135–145)
T4 FREE SERPL-MCNC: 1.2 NG/DL — SIGNIFICANT CHANGE UP (ref 0.9–1.8)
WBC # BLD: 4.78 K/UL — SIGNIFICANT CHANGE UP (ref 3.8–10.5)
WBC # FLD AUTO: 4.78 K/UL — SIGNIFICANT CHANGE UP (ref 3.8–10.5)

## 2020-11-02 PROCEDURE — 99239 HOSP IP/OBS DSCHRG MGMT >30: CPT | Mod: GC

## 2020-11-02 PROCEDURE — 85730 THROMBOPLASTIN TIME PARTIAL: CPT

## 2020-11-02 PROCEDURE — 97161 PT EVAL LOW COMPLEX 20 MIN: CPT

## 2020-11-02 PROCEDURE — 85610 PROTHROMBIN TIME: CPT

## 2020-11-02 PROCEDURE — 87635 SARS-COV-2 COVID-19 AMP PRB: CPT

## 2020-11-02 PROCEDURE — 73030 X-RAY EXAM OF SHOULDER: CPT

## 2020-11-02 PROCEDURE — 84481 FREE ASSAY (FT-3): CPT

## 2020-11-02 PROCEDURE — 80061 LIPID PANEL: CPT

## 2020-11-02 PROCEDURE — 36415 COLL VENOUS BLD VENIPUNCTURE: CPT

## 2020-11-02 PROCEDURE — 81001 URINALYSIS AUTO W/SCOPE: CPT

## 2020-11-02 PROCEDURE — 83735 ASSAY OF MAGNESIUM: CPT

## 2020-11-02 PROCEDURE — 84439 ASSAY OF FREE THYROXINE: CPT

## 2020-11-02 PROCEDURE — 82009 KETONE BODYS QUAL: CPT

## 2020-11-02 PROCEDURE — 84100 ASSAY OF PHOSPHORUS: CPT

## 2020-11-02 PROCEDURE — 82962 GLUCOSE BLOOD TEST: CPT

## 2020-11-02 PROCEDURE — 72170 X-RAY EXAM OF PELVIS: CPT

## 2020-11-02 PROCEDURE — 83036 HEMOGLOBIN GLYCOSYLATED A1C: CPT

## 2020-11-02 PROCEDURE — 93880 EXTRACRANIAL BILAT STUDY: CPT

## 2020-11-02 PROCEDURE — 84484 ASSAY OF TROPONIN QUANT: CPT

## 2020-11-02 PROCEDURE — 74177 CT ABD & PELVIS W/CONTRAST: CPT

## 2020-11-02 PROCEDURE — 71260 CT THORAX DX C+: CPT

## 2020-11-02 PROCEDURE — 99285 EMERGENCY DEPT VISIT HI MDM: CPT

## 2020-11-02 PROCEDURE — 93306 TTE W/DOPPLER COMPLETE: CPT

## 2020-11-02 PROCEDURE — 80053 COMPREHEN METABOLIC PANEL: CPT

## 2020-11-02 PROCEDURE — 93005 ELECTROCARDIOGRAM TRACING: CPT

## 2020-11-02 PROCEDURE — 84443 ASSAY THYROID STIM HORMONE: CPT

## 2020-11-02 PROCEDURE — 85025 COMPLETE CBC W/AUTO DIFF WBC: CPT

## 2020-11-02 PROCEDURE — 70450 CT HEAD/BRAIN W/O DYE: CPT

## 2020-11-02 PROCEDURE — 72125 CT NECK SPINE W/O DYE: CPT

## 2020-11-02 PROCEDURE — 80299 QUANTITATIVE ASSAY DRUG: CPT

## 2020-11-02 PROCEDURE — 82550 ASSAY OF CK (CPK): CPT

## 2020-11-02 RX ORDER — WARFARIN SODIUM 2.5 MG/1
5 TABLET ORAL DAILY
Refills: 0 | Status: DISCONTINUED | OUTPATIENT
Start: 2020-11-02 | End: 2020-11-02

## 2020-11-02 RX ORDER — SIMVASTATIN 20 MG/1
20 TABLET, FILM COATED ORAL AT BEDTIME
Refills: 0 | Status: DISCONTINUED | OUTPATIENT
Start: 2020-11-02 | End: 2020-11-02

## 2020-11-02 RX ORDER — LOSARTAN POTASSIUM 100 MG/1
100 TABLET, FILM COATED ORAL DAILY
Refills: 0 | Status: DISCONTINUED | OUTPATIENT
Start: 2020-11-02 | End: 2020-11-02

## 2020-11-02 RX ORDER — OMEPRAZOLE 10 MG/1
1 CAPSULE, DELAYED RELEASE ORAL
Qty: 0 | Refills: 0 | DISCHARGE

## 2020-11-02 RX ADMIN — HEPARIN SODIUM 5000 UNIT(S): 5000 INJECTION INTRAVENOUS; SUBCUTANEOUS at 06:00

## 2020-11-02 RX ADMIN — PANTOPRAZOLE SODIUM 40 MILLIGRAM(S): 20 TABLET, DELAYED RELEASE ORAL at 06:00

## 2020-11-02 RX ADMIN — AMLODIPINE BESYLATE 5 MILLIGRAM(S): 2.5 TABLET ORAL at 06:00

## 2020-11-02 NOTE — DISCHARGE NOTE PROVIDER - NSDCMRMEDTOKEN_GEN_ALL_CORE_FT
irbesartan 300 mg oral tablet: 1 tab(s) orally once a day  risedronate 150 mg oral tablet: 1 tab(s) orally once a month  simvastatin 20 mg oral tablet: 1 tab(s) orally once a day (at bedtime)  warfarin 2 mg oral tablet: 1 tab(s) orally once a day MONDAY THROUGH WEDNESDAY  warfarin 4 mg oral tablet: 1 tab(s) orally once a day THURSDAY THROUGH SUNDAY

## 2020-11-02 NOTE — PROGRESS NOTE ADULT - PROBLEM SELECTOR PLAN 2
Pt admitted post fall  CK 1500  cR 1.06  Rhabdomyolysis from prolonged immobilization   f/u PT Pt admitted post fall, CK 1500 upon admission, possibly 2/2 prolonged immobilization   -CK downtrending, today at 413  -Creatinine 0.88 Pt admitted post fall, CK 1500 upon admission, possibly 2/2 prolonged immobilization   - CK downtrending, today at 413  - Creatinine 0.88

## 2020-11-02 NOTE — PROGRESS NOTE ADULT - PROBLEM SELECTOR PLAN 3
CT CHEST Bilateral hypodense nodules in the thyroid gland measure up to 1.4 cm on the left.  f/U TSH in am  Pt thinks ? she has Thyroid issues from before CT CHEST B/L hypodense nodules in the thyroid gland up to 1.4 cm on the left  - Pt thinks ? she has Thyroid issues from before  -TSH 1.2 WNL CT CHEST B/L hypodense nodules in the thyroid gland up to 1.4 cm on the left  - Pt thinks ? she has Thyroid issues from before  - TSH 1.2 WNL  - Follow outpatient

## 2020-11-02 NOTE — PROGRESS NOTE ADULT - PROBLEM SELECTOR PLAN 1
Pt admitted for [facility] fall at home, denies any head trauma or LOC  - CT head n spine- no acute changes, old C1 fractures  - C/w Pain meds  - F/U PT   - F/U ECHO, carotid Pt admitted for [facility] fall at home, denies any head trauma or LOC  - CT head/ spine- no acute changes, old C1 fractures  - C/w Pain meds  - F/U PT   - ECHO showed Grade 2 diastolic dysfunction  - Carotid doppler showed <50% stenosis Pt admitted for [facility] fall at home, denies any head trauma or LOC  - CT head/ spine- no acute changes, old C1 fractures  - C/w Pain meds  - PT recommends LEONA, pending authorization. Pt would like to be d/c home.   - ECHO showed Grade 2 diastolic dysfunction  - Carotid doppler showed <50% stenosis Pt admitted for [facility] fall at home, denies any head trauma or LOC  - CT head/ spine- no acute changes, old C1 fractures  - C/w Pain meds  - PT recommends LEONA, pending authorization. Pt would like to be d/c home.   - ECHO showed Grade 2 diastolic dysfunction  - Carotid doppler showed <50% stenosis b/l [facility], denies any head trauma or LOC  ekg showed NSR  - CT head/ spine- no acute changes, old C1 fractures  - C/w Pain meds  - PT recommends LEONA, pending authorization. Pt would like to be d/c home.   - ECHO showed Grade 2 diastolic dysfunction  - Carotid doppler showed <50% stenosis b/l

## 2020-11-02 NOTE — PROGRESS NOTE ADULT - PROBLEM SELECTOR PLAN 6
Pt has PMH of HTN   / 63  PRIMARY TEAM TO CONFIRM HOME MEDS WITH DAUGHTER   starting on amlodipine 5mg od  Monitor vitals Pt has PMH of HTN   -C/w Amlodipine 5mg od  - Monitor vitals Pt has PMH of HTN   - C/w Amlodipine 5mg od  - Monitor vitals Pt has PMH of HTN   - C/w losartan 100  - Monitor vitals

## 2020-11-02 NOTE — PROGRESS NOTE ADULT - ASSESSMENT
85 female, from home, walks with a cane  with a PMhx of DM, HTN, DVT (on coumadin) and a PSHx of a knee replacement reports to the ED s/p fall. Patient complains of left sided pain in the arms and legs. CT Head showed no acute changes, CP spine showed old fractures at C1. CTAP showed a 3cm Left Renal Mass. CK was 1500   85 female, from home, walks with a cane  with a PMhx of DM, HTN, DVT (on coumadin) and a PSHx of a knee replacement reports to the ED s/p fall. Patient complains of left sided pain in the arms and legs. CT Head showed no acute changes, CT spine showed old fractures at C1. CTAP showed a 3cm Left Renal Mass. XRAY Pelvis and Xray Shoulder showed no fractures. CK was 1500 upon admission, but has been consistently downtrending.   85 female, from home, walks with a cane  with a PMhx of DM, HTN, DVT (on coumadin) and a PSHx of a knee replacement reports to the ED s/p fall. Patient complains of left sided pain in the arms and legs. CT Head showed no acute changes, CT spine showed old fractures at C1. CTAP showed a 3cm Left Renal Mass. XRAY Pelvis and Xray Shoulder showed no fractures. CK was 1500 upon admission, but has been consistently downtrending.

## 2020-11-02 NOTE — PROGRESS NOTE ADULT - PROBLEM SELECTOR PLAN 5
Pt has PMH of DM  home meds - not avaibale  to start on HSS  bs- 144  monitor FS Pt has PMH of DM- Pt does not take home diabetes meds  - HSS  - Blood sugar today 99

## 2020-11-02 NOTE — PROGRESS NOTE ADULT - PROBLEM SELECTOR PLAN 8
IMPROVE VTE Individual Risk Assessment    RISK                                                          Points  [] Previous VTE                                           3  [] Thrombophilia                                        2  [] Lower limb paralysis                              2   [] Current Cancer                                       2   [x] Immobilization > 24 hrs                        1  [x]x ICU/CCU stay > 24 hours                       1  [x] Age > 60                                                   1    IMPROVE VTE Score: 3  lovenox ppi IMPROVE VTE Individual Risk Assessment    RISK                                                          Points  [] Previous VTE                                           3  [] Thrombophilia                                        2  [] Lower limb paralysis                              2   [] Current Cancer                                       2   [x] Immobilization > 24 hrs                        1  [x]x ICU/CCU stay > 24 hours                       1  [x] Age > 60                                                   1    IMPROVE VTE Score: 3   ppi  on warfarin

## 2020-11-02 NOTE — DISCHARGE NOTE PROVIDER - HOSPITAL COURSE
85 female PMhx of DM, HTN, DVT (on coumadin) and PSHx of a knee replacement reports to the ED s/p fall. Pt was in her usual state of health when she accidentally slipped after getting up from her chair upon finishing her breakfast. She lost her balance and fell on her L side. Daughter who was home helped her get up. Patient endorses pain to the L side of her back and neck, difficulty raising her legs. Patient states she was able to walk with her cane after the fall. Upon admission, CK was 1500, but has been consistently downtrending. EKG showed NSR, CT head was negative and echo showed normal EF. Xray of pelvis and left shoulder showed no fracture or dislocation. CTAP should no evidence of acute injury, but a left indeterminate renal lesion. CT spine should old fracture at C1. Patient was recommended by PT to go to [facility] [place], however patient refuses.      85 female PMhx of DM, HTN, DVT (on coumadin) and PSHx of a knee replacement reports to the ED s/p fall. Pt was in her usual state of health when she accidentally slipped after getting up from her chair upon finishing her breakfast. She lost her balance and fell on her L side. Daughter who was home helped her get up. Patient endorses pain to the L side of her back and neck, difficulty raising her legs. Patient states she was able to walk with her cane after the fall. Upon admission, CK was 1500, but has been consistently downtrending. EKG showed NSR, CT head was negative and echo showed normal EF. Xray of pelvis and left shoulder showed no fracture or dislocation. CTAP should no evidence of acute injury, but a left indeterminate renal lesion. CT spine should old fracture at C1.echo was done and showed normal ejection fraction. Patient was recommended by PT to go to [facility], however patient refuses.      85 female PMhx of DM, HTN, DVT (on coumadin) and PSHx of a knee replacement reports to the ED s/p fall. Pt was in her usual state of health when she accidentally slipped after getting up from her chair upon finishing her breakfast. She lost her balance and fell on her L side. Daughter who was home helped her get up. Patient endorses pain to the L side of her back and neck, difficulty raising her legs. Patient states she was able to walk with her cane after the fall. Upon admission, CK was 1500, but has been consistently downtrending. EKG showed NSR, CT head was negative and echo showed normal EF, grade 2 diastolic dysfunction. Xray of pelvis and left shoulder showed no fracture or dislocation. CT Abdomen and Pelvis showed no evidence of acute injury, but a left indeterminate renal lesion. CT spine showed old fracture at C1 anterior and posterior arches. Patient was recommended by PT to go to [facility], however patient preferred to go home and have home PT. Patient was counselled extensively about risk of ICH if she falls while on AC.   Patient will be discharged home on home dose of Warfarin.

## 2020-11-02 NOTE — PROGRESS NOTE ADULT - PROBLEM SELECTOR PLAN 4
CT abd- 3cl L renal lesion   Cr- wnl  Pt can follow OP - CT abd- 3cm indeterminate L renal lesion   - CR- 0.88  - Pt can follow OP

## 2020-11-02 NOTE — DISCHARGE NOTE PROVIDER - NSDCCPCAREPLAN_GEN_ALL_CORE_FT
PRINCIPAL DISCHARGE DIAGNOSIS  Diagnosis: Rhabdomyolysis  Assessment and Plan of Treatment: You had a high level of creatinine kinase when you arrived to the emergency department. There was concern for rhabdomyolysis; however, your levels have significantly decreased throughout your hospital stay, and it is likely attributed to being immobile. You were hydrated with fluids and watched carefully for any changes. Please follow up with your PCP in 1-2 weeks.      SECONDARY DISCHARGE DIAGNOSES  Diagnosis: Renal lesion  Assessment and Plan of Treatment: Your CT abdomen pelvis showed an incidental finding of a 3cm indeterminate interpolar Left renal mass. Please follow up with your PCP regarding further management.    Diagnosis: Thyroid nodule  Assessment and Plan of Treatment: There was an incidental finding of bilateral hypodense thyroid nodules in your thyroid gland measuring up to 1.4 cm on the left side on your CT Chest. Your TSH was further evaluated and it was within normal limits. Please follow up with your PCP for further management.      Diagnosis: Neck pain  Assessment and Plan of Treatment: Your CT of your neck showed no acute fractures, however did show an old fracture at the level of C1.  Pain was managed with medication. Please follow up with your PCP in 1-2 weeks.    Diagnosis: Fall at home  Assessment and Plan of Treatment: You fell on your left side at home after losing balance. Further imaging did not show any fractures or dislocation in your pelvis. An XRAY of your left shoulder was done which did not show any fractures. A CT head was done, which was negative. You are on Coumadin, which puts you at risk for a brain bleed, especially after a fall. You were recommended to go to subacute rehabilitation to build your strength in order to avoid further falls and possible complications of bleeding. Please continue home exercise in order to avoid further falls. Please follow up with your PCP in 1-2 weeks.     PRINCIPAL DISCHARGE DIAGNOSIS  Diagnosis: Rhabdomyolysis  Assessment and Plan of Treatment: You had a high level of creatinine kinase when you arrived to the emergency department. There was concern for rhabdomyolysis; however, your levels have significantly decreased throughout your hospital stay, and it is likely attributed to being immobile. You were hydrated with fluids and watched carefully for any changes. Please follow up with your PCP in 1-2 weeks.      SECONDARY DISCHARGE DIAGNOSES  Diagnosis: Renal lesion  Assessment and Plan of Treatment: Your CT abdomen pelvis showed an incidental finding of a 3cm indeterminate interpolar Left renal mass. Please follow up with your PCP regarding further management.    Diagnosis: Thyroid nodule  Assessment and Plan of Treatment: There was an incidental finding of bilateral hypodense thyroid nodules in your thyroid gland measuring up to 1.4 cm on the left side on your CT Chest. Your TSH was further evaluated and it was within normal limits. Please follow up with your PCP for further management.      Diagnosis: Neck pain  Assessment and Plan of Treatment: Your CT of your neck showed no acute fractures, however did show an old fracture at the level of C1.  Pain was managed with medication. Please follow up with your PCP in 1-2 weeks.    Diagnosis: Fall at home  Assessment and Plan of Treatment: You fell on your left side at home after losing balance. Further imaging did not show any fractures or dislocation in your pelvis. An XRAY of your left shoulder was done which did not show any fractures. A CT head was done, which was negative. Echo was done and showed normal ejection fraction. You are on Coumadin, which puts you at risk for a brain bleed, especially after a fall. You were recommended to go to subacute rehabilitation to build your strength in order to avoid further falls and possible complications of bleeding. Please continue home exercise in order to avoid further falls. Please follow up with your PCP in 1-2 weeks.    Diagnosis: DVT (deep venous thrombosis)  Assessment and Plan of Treatment: you had past history of DVT . you are recommended to continue on coumadin as prescriped and follow up with your PCP after discharge.     PRINCIPAL DISCHARGE DIAGNOSIS  Diagnosis: Rhabdomyolysis  Assessment and Plan of Treatment: You had a high level of creatinine kinase when you arrived to the emergency department due to fall. There was concern for rhabdomyolysis; however, your levels have significantly decreased throughout your hospital stay. You were hydrated with fluids and watched carefully for any changes. Please follow up with your PCP in 1-2 weeks.      SECONDARY DISCHARGE DIAGNOSES  Diagnosis: DVT (deep venous thrombosis)  Assessment and Plan of Treatment: you had past history of DV . You are recommended to continue on your home dose of  coumadin and follow up with your PCP after discharge.    Diagnosis: Renal lesion  Assessment and Plan of Treatment: Your CT abdomen pelvis showed an incidental finding of a 3cm indeterminate interpolar Left renal mass. Please follow up with your PCP regarding further assessment.    Diagnosis: Thyroid nodule  Assessment and Plan of Treatment: There was an incidental finding of bilateral hypodense thyroid nodules in your thyroid gland measuring up to 1.4 cm on the left side on your CT Chest. Your TSH was further evaluated and it was within normal limits. Please follow up with your PCP for further management and follow up imagings.    Diagnosis: Neck pain  Assessment and Plan of Treatment: Your CT of your neck showed no acute fractures, however did show an old fracture at the level of C1 arches.  Pain has improved. Please follow up with your PCP in 1-2 weeks.    Diagnosis: Fall at home  Assessment and Plan of Treatment: You fell on your left side at home after losing balance. Further imaging did not show any fractures or dislocation in your pelvis. An XRAY of your left shoulder was done which did not show any fractures. A CT head was done, which was negative. Echo was done and showed normal ejection fraction. You are on Coumadin, which puts you at risk for a brain bleed, especially after a fall. You were recommended to go to subacute rehabilitation to build your strength in order to avoid further falls and possible complications of bleeding. But you prefered to go home. Please continue home exercise in order to avoid further falls. Please follow up with your PCP in 1-2 weeks.

## 2020-11-02 NOTE — DISCHARGE NOTE NURSING/CASE MANAGEMENT/SOCIAL WORK - PATIENT PORTAL LINK FT
You can access the FollowMyHealth Patient Portal offered by [facility] by registering at the following website: [email or web address]. By joining [employer]’s FollowMyHealth portal, you will also be able to view your health information using other applications (apps) compatible with our system.

## 2020-11-02 NOTE — DISCHARGE NOTE PROVIDER - NSDCPNSUBOBJ_GEN_ALL_CORE
85y old  Female who presents with a chief complaint of mechanical fall (2020 13:54)    Patient was seen and examined at bedside   Denies any complains today     INTERVAL HPI/OVERNIGHT EVENTS:  T(C): 37 (20 @ 13:31), Max: 37 (20 @ 13:31)  HR: 67 (20 @ 13:31) (65 - 67)  BP: 129/54 (20 @ 13:31) (126/48 - 146/53)  RR: 16 (20 @ 13:31) (16 - 16)  SpO2: 100% (20 @ 13:31) (98% - 100%)  Wt(kg): --  I&O's Summary      REVIEW OF SYSTEMS: denies fever, chills, SOB, palpitations, chest pain, abdominal pain, nausea, vomiting, diarrhea, constipation, dizziness    MEDICATIONS  (STANDING):  influenza   Vaccine 0.5 milliLiter(s) IntraMuscular once  insulin lispro (ADMELOG) corrective regimen sliding scale   SubCutaneous three times a day before meals  losartan 100 milliGRAM(s) Oral daily  pantoprazole    Tablet 40 milliGRAM(s) Oral before breakfast  simvastatin 20 milliGRAM(s) Oral at bedtime  sodium chloride 0.9%. 1000 milliLiter(s) (80 mL/Hr) IV Continuous <Continuous>    MEDICATIONS  (PRN):      PHYSICAL EXAM:  GENERAL: NAD  NERVOUS SYSTEM:  Alert & Oriented X3, Good concentration; Motor Strength 4/5 B/L lower extremities  CHEST/LUNG: Clear to auscultation bilaterally; No rales, rhonchi, wheezing, or rubs  HEART: Regular rate and rhythm; No murmurs, rubs, or gallops  ABDOMEN: Soft, Nontender, Nondistended; Bowel sounds present  EXTREMITIES: BL knee old incisions,  2+ Peripheral Pulses, No clubbing, cyanosis, or edema  SKIN: No rashes or lesions    LABS:                        11.8   4.78  )-----------( 156      ( 2020 05:52 )             37.6     143  |  111<H>  |  22<H>  ----------------------------<  96  4.0   |  28  |  1.18    CAPILLARY BLOOD GLUCOSE    POCT Blood Glucose.: 118 mg/dL (2020 16:22)      Urinalysis Basic - ( 2020 11:13 )    Color: Yellow / Appearance: Clear / S.015 / pH: x  Gluc: x / Ketone: Negative  / Bili: Negative / Urobili: Negative   Blood: x / Protein: Negative / Nitrite: Negative   Leuk Esterase: Negative / RBC: 0-2 /HPF / WBC 3-5 /HPF   Sq Epi: x / Non Sq Epi: Few /HPF / Bacteria: Few /HPF    A/P:  Rhabdomyolysis   DVT on coumarin   Fall at home   DM  HTN  Renal lesion  Thyroid nodule   Known h/o arthritis  BL knee replacement     Plan:   Imaging reviewed  No fracture or dislocation  CK trending down   INR ~2  Patient is AAOx3. Lives with her son and daughter and independent for her ADLs. She follows up with her PCP for INR. Her DVT was in , and BL knee replacement in . 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 She has a good follow up with her PCP for INR monitoring. She was evaluated by PT and was advised to go to [facility]. But she prefers to go home. Risks and benefits of AC and having falls was discussed with patient. Understands and still wants to go home and will follow up with PCP for AC management. Patient will be discharged home with home PT as per her wish and will follow up with PCP. Will cont on home dose of Coumarin.   Will cont the rest of the home medications  Follow up with PCP for thyroid nodule and renal lesion

## 2020-11-02 NOTE — PROGRESS NOTE ADULT - SUBJECTIVE AND OBJECTIVE BOX
Medical Student Progress Note discussed with PGY-1    CHIEF COMPLAINT & BRIEF HOSPITAL COURSE:  85 female PMhx of DM, HTN, DVT (on coumadin) and PSHx of a knee replacement reports to the ED s/p fall. Pt was in her usual state of health until this morning when she accidentally slipped after getting up from her chair on finishing her Breakfast. As per the patient she lost her balance  and fell on her L side. Patient endorses pain to the L side of her back and neck . Patient endorses she cannot raise her L arm and has difficulty to raise legs.  Daughter who was home helped her get up. Patient states she was able to walk with her bucio after the fall.     INTERVAL HPI/OVERNIGHT EVENTS:   Patient had no acute events overnight. She states she slept well and ate well. Patient endorses that he has left sided hip pain and back pain as well as pain in her legs.     MEDICATIONS  (STANDING):  amLODIPine   Tablet 5 milliGRAM(s) Oral daily  heparin   Injectable 5000 Unit(s) SubCutaneous every 12 hours  influenza   Vaccine 0.5 milliLiter(s) IntraMuscular once  insulin lispro (ADMELOG) corrective regimen sliding scale   SubCutaneous three times a day before meals  pantoprazole    Tablet 40 milliGRAM(s) Oral before breakfast  sodium chloride 0.9%. 1000 milliLiter(s) (80 mL/Hr) IV Continuous <Continuous>    MEDICATIONS  (PRN):      REVIEW OF SYSTEMS:  CONSTITUTIONAL: No fever, weight loss, or fatigue  RESPIRATORY: No cough, wheezing, chills or hemoptysis; No shortness of breath  CARDIOVASCULAR: No chest pain, palpitations, dizziness, or leg swelling  GASTROINTESTINAL: No abdominal pain. No nausea, vomiting, or hematemesis; No diarrhea or constipation. No melena or hematochezia.  GENITOURINARY: No dysuria or hematuria, urinary frequency  NEUROLOGICAL: No headaches, memory loss, loss of strength, numbness, or tremors  SKIN: No itching, burning, rashes, or lesions     Vital Signs Last 24 Hrs  T(C): 36.8 (02 Nov 2020 05:04), Max: 36.8 (02 Nov 2020 05:04)  T(F): 98.3 (02 Nov 2020 05:04), Max: 98.3 (02 Nov 2020 05:04)  HR: 66 (02 Nov 2020 05:04) (51 - 72)  BP: 144/49 (02 Nov 2020 05:04) (116/47 - 147/54)  BP(mean): --  RR: 16 (02 Nov 2020 05:04) (16 - 16)  SpO2: 98% (02 Nov 2020 05:04) (98% - 100%)    PHYSICAL EXAMINATION:  GENERAL: NAD, well built  HEAD:  Atraumatic, Normocephalic  EYES:  conjunctiva and sclera clear  NECK: Supple, No JVD, Normal thyroid  CHEST/LUNG: Clear to auscultation. Clear to percussion bilaterally; No rales, rhonchi, wheezing, or rubs  HEART: Regular rate and rhythm; No murmurs, rubs, or gallops  ABDOMEN: Soft, Nontender, Nondistended; Bowel sounds present  NERVOUS SYSTEM:  Alert & Oriented X3,    EXTREMITIES:  2+ Peripheral Pulses, No clubbing, cyanosis, or edema  SKIN: warm dry                          11.8   4.78  )-----------( 156      ( 02 Nov 2020 05:52 )             37.6     11-02    143  |  111<H>  |  22<H>  ----------------------------<  96  4.0   |  28  |  1.18    Ca    8.6      02 Nov 2020 05:52  Phos  2.6     11-02  Mg     2.1     11-02    TPro  6.4  /  Alb  2.5<L>  /  TBili  0.3  /  DBili  x   /  AST  24  /  ALT  35  /  AlkPhos  48  11-02    LIVER FUNCTIONS - ( 02 Nov 2020 05:52 )  Alb: 2.5 g/dL / Pro: 6.4 g/dL / ALK PHOS: 48 U/L / ALT: 35 U/L DA / AST: 24 U/L / GGT: x           CARDIAC MARKERS ( 02 Nov 2020 05:52 )  x     / x     / 413 U/L / x     / x      CARDIAC MARKERS ( 01 Nov 2020 05:48 )  0.022 ng/mL / x     / 887 U/L / x     / x      CARDIAC MARKERS ( 31 Oct 2020 22:20 )  0.027 ng/mL / x     / 1551 U/L / x     / x          PT/INR - ( 02 Nov 2020 05:52 )   PT: 22.2 sec;   INR: 1.92 ratio         PTT - ( 02 Nov 2020 05:52 )  PTT:32.5 sec    CAPILLARY BLOOD GLUCOSE      POCT Blood Glucose.: 92 mg/dL (02 Nov 2020 07:56)          RADIOLOGY & ADDITIONAL TESTS:   Xray Pelvis AP only (11.01.20 @ 19:38): no radiographic evidence of acute fracture or dislocation. If symptoms persist, consider CT or MRI exam to exclude occult fracture.    CT Abdomen and Pelvis w/ IV Cont (10.31.20 @ 23:51): No evidence of acute traumatic injury to the chest, abdomen, or pelvis.  3 cm indeterminate left interpolar renal lesion may be further characterized with ultrasound or MRI.  Small hiatal hernia.    CT Cervical Spine No Cont (10.31.20 @ 23:50) BRAIN: No intracranial hemorrhage, mass effect or depressed skull fracture. CERVICAL SPINE: No acute displaced fracture or traumatic malalignment. Old fractures of right anterior and posterior arches of C1.       Medical Student Progress Note discussed with PGY-1    CHIEF COMPLAINT & BRIEF HOSPITAL COURSE:  85 female PMhx of DM, HTN, DVT (on coumadin) and PSHx of a knee replacement reports to the ED s/p fall. Pt was in her usual state of health until this morning when she accidentally slipped after getting up from her chair on finishing her breakfast. As per the patient she lost her balance and fell on her L side. Daughter who was home helped her get up. Patient endorses pain to the L side of her back and neck, difficulty raising her legs. Patient states she was able to walk with her bucio after the fall.     INTERVAL HPI/OVERNIGHT EVENTS:   Patient had no acute events overnight. She states she slept well and ate well. Patient endorses that he has left sided hip pain and back pain as well as pain in her legs, but is otherwise resting comfortably in bed.    MEDICATIONS  (STANDING):  amLODIPine   Tablet 5 milliGRAM(s) Oral daily  heparin   Injectable 5000 Unit(s) SubCutaneous every 12 hours  influenza   Vaccine 0.5 milliLiter(s) IntraMuscular once  insulin lispro (ADMELOG) corrective regimen sliding scale   SubCutaneous three times a day before meals  pantoprazole    Tablet 40 milliGRAM(s) Oral before breakfast  sodium chloride 0.9%. 1000 milliLiter(s) (80 mL/Hr) IV Continuous <Continuous>  warfarin 5 milliGRAM(s) Oral daily      MEDICATIONS  (PRN):      REVIEW OF SYSTEMS:  CONSTITUTIONAL: No fever, weight loss, or fatigue  RESPIRATORY: No cough, wheezing, chills or hemoptysis; No shortness of breath  CARDIOVASCULAR: No chest pain, palpitations, dizziness, or leg swelling  GASTROINTESTINAL: No abdominal pain. No nausea, vomiting, or hematemesis; No diarrhea or constipation. No melena or hematochezia.  GENITOURINARY: No dysuria or hematuria, urinary frequency  NEUROLOGICAL: No headaches, memory loss, loss of strength, numbness, or tremors  MSK: +Back pain, +Hip pain, +Left sided pain  SKIN: No itching, burning, rashes, or lesions     Vital Signs Last 24 Hrs  T(C): 36.8 (02 Nov 2020 05:04), Max: 36.8 (02 Nov 2020 05:04)  T(F): 98.3 (02 Nov 2020 05:04), Max: 98.3 (02 Nov 2020 05:04)  HR: 66 (02 Nov 2020 05:04) (51 - 72)  BP: 144/49 (02 Nov 2020 05:04) (116/47 - 147/54)  BP(mean): --  RR: 16 (02 Nov 2020 05:04) (16 - 16)  SpO2: 98% (02 Nov 2020 05:04) (98% - 100%)    PHYSICAL EXAMINATION:  GENERAL: NAD, resting comfortably in bed  HEAD:  Atraumatic, Normocephalic  EYES:  conjunctiva and sclera clear  NECK: Supple, No JVD, Normal thyroid  CHEST/LUNG: Clear to auscultation. Clear to percussion bilaterally; No rales, rhonchi, wheezing, or rubs  HEART: Regular rate and rhythm; No murmurs, rubs, or gallops  ABDOMEN: Soft, Nontender, Nondistended; Bowel sounds present  NERVOUS SYSTEM:  Alert & Oriented X3  MSK: 4/5 B/L Lower Extremities, 5/5 B/L Upper extremities, Chronic arthritic changes in hands B/L, 2+ Peripheral Pulses, No clubbing, cyanosis, or edema  SKIN: warm dry                          11.8   4.78  )-----------( 156      ( 02 Nov 2020 05:52 )             37.6     11-02    143  |  111<H>  |  22<H>  ----------------------------<  96  4.0   |  28  |  1.18    Ca    8.6      02 Nov 2020 05:52  Phos  2.6     11-02  Mg     2.1     11-02    TPro  6.4  /  Alb  2.5<L>  /  TBili  0.3  /  DBili  x   /  AST  24  /  ALT  35  /  AlkPhos  48  11-02    LIVER FUNCTIONS - ( 02 Nov 2020 05:52 )  Alb: 2.5 g/dL / Pro: 6.4 g/dL / ALK PHOS: 48 U/L / ALT: 35 U/L DA / AST: 24 U/L / GGT: x           CARDIAC MARKERS ( 02 Nov 2020 05:52 )  x     / x     / 413 U/L / x     / x      CARDIAC MARKERS ( 01 Nov 2020 05:48 )  0.022 ng/mL / x     / 887 U/L / x     / x      CARDIAC MARKERS ( 31 Oct 2020 22:20 )  0.027 ng/mL / x     / 1551 U/L / x     / x        PT/INR - ( 02 Nov 2020 05:52 )   PT: 22.2 sec;   INR: 1.92 ratio        PTT - ( 02 Nov 2020 05:52 )  PTT:32.5 sec    CAPILLARY BLOOD GLUCOSE      POCT Blood Glucose.: 92 mg/dL (02 Nov 2020 07:56)        RADIOLOGY & ADDITIONAL TESTS:   Xray Pelvis AP only (11.01.20 @ 19:38): no radiographic evidence of acute fracture or dislocation. If symptoms persist, consider CT or MRI exam to exclude occult fracture.    CT Abdomen and Pelvis w/ IV Cont (10.31.20 @ 23:51): No evidence of acute traumatic injury to the chest, abdomen, or pelvis.  3 cm indeterminate left interpolar renal lesion may be further characterized with ultrasound or MRI.  Small hiatal hernia.    CT Cervical Spine No Cont (10.31.20 @ 23:50) BRAIN: No intracranial hemorrhage, mass effect or depressed skull fracture. CERVICAL SPINE: No acute displaced fracture or traumatic malalignment. Old fractures of right anterior and posterior arches of C1.       Medical Student Progress Note discussed with PGY-1    CHIEF COMPLAINT & BRIEF HOSPITAL COURSE:  85 female PMhx of DM, HTN, DVT (on coumadin) and PSHx of a knee replacement reports to the ED s/p fall. Pt was in her usual state of health until this morning when she accidentally slipped after getting up from her chair on finishing her breakfast. As per the patient she lost her balance and fell on her L side. Daughter who was home helped her get up. Patient endorses pain to the L side of her back and neck, difficulty raising her legs. Patient states she was able to walk with her bucio after the fall. Patient has a home health aid 5 days a week.    INTERVAL HPI/OVERNIGHT EVENTS:   Patient had no acute events overnight. She states she slept well and ate well. Patient endorses that he has left sided hip pain and back pain as well as pain in her legs, but is otherwise resting comfortably in bed.    MEDICATIONS  (STANDING):  amLODIPine   Tablet 5 milliGRAM(s) Oral daily  heparin   Injectable 5000 Unit(s) SubCutaneous every 12 hours  influenza   Vaccine 0.5 milliLiter(s) IntraMuscular once  insulin lispro (ADMELOG) corrective regimen sliding scale   SubCutaneous three times a day before meals  pantoprazole    Tablet 40 milliGRAM(s) Oral before breakfast  sodium chloride 0.9%. 1000 milliLiter(s) (80 mL/Hr) IV Continuous <Continuous>  warfarin 5 milliGRAM(s) Oral daily      MEDICATIONS  (PRN):      REVIEW OF SYSTEMS:  CONSTITUTIONAL: No fever, weight loss, or fatigue  RESPIRATORY: No cough, wheezing, chills or hemoptysis; No shortness of breath  CARDIOVASCULAR: No chest pain, palpitations, dizziness, or leg swelling  GASTROINTESTINAL: No abdominal pain. No nausea, vomiting, or hematemesis; No diarrhea or constipation. No melena or hematochezia.  GENITOURINARY: No dysuria or hematuria, urinary frequency  NEUROLOGICAL: No headaches, memory loss, loss of strength, numbness, or tremors  MSK: +Back pain, +Hip pain, +Left sided pain  SKIN: No itching, burning, rashes, or lesions     Vital Signs Last 24 Hrs  T(C): 36.8 (02 Nov 2020 05:04), Max: 36.8 (02 Nov 2020 05:04)  T(F): 98.3 (02 Nov 2020 05:04), Max: 98.3 (02 Nov 2020 05:04)  HR: 66 (02 Nov 2020 05:04) (51 - 72)  BP: 144/49 (02 Nov 2020 05:04) (116/47 - 147/54)  BP(mean): --  RR: 16 (02 Nov 2020 05:04) (16 - 16)  SpO2: 98% (02 Nov 2020 05:04) (98% - 100%)    PHYSICAL EXAMINATION:  GENERAL: NAD, resting comfortably in bed  HEAD:  Atraumatic, Normocephalic  EYES:  conjunctiva and sclera clear  NECK: Supple, No JVD, Normal thyroid  CHEST/LUNG: Clear to auscultation. Clear to percussion bilaterally; No rales, rhonchi, wheezing, or rubs  HEART: Regular rate and rhythm; No murmurs, rubs, or gallops  ABDOMEN: Soft, Nontender, Nondistended; Bowel sounds present  NERVOUS SYSTEM:  Alert & Oriented X3  MSK: 4/5 B/L Lower Extremities, 5/5 B/L Upper extremities, Chronic arthritic changes in hands B/L, 2+ Peripheral Pulses, No clubbing, cyanosis, or edema  SKIN: warm dry                          11.8   4.78  )-----------( 156      ( 02 Nov 2020 05:52 )             37.6     11-02    143  |  111<H>  |  22<H>  ----------------------------<  96  4.0   |  28  |  1.18    Ca    8.6      02 Nov 2020 05:52  Phos  2.6     11-02  Mg     2.1     11-02    TPro  6.4  /  Alb  2.5<L>  /  TBili  0.3  /  DBili  x   /  AST  24  /  ALT  35  /  AlkPhos  48  11-02    LIVER FUNCTIONS - ( 02 Nov 2020 05:52 )  Alb: 2.5 g/dL / Pro: 6.4 g/dL / ALK PHOS: 48 U/L / ALT: 35 U/L DA / AST: 24 U/L / GGT: x           CARDIAC MARKERS ( 02 Nov 2020 05:52 )  x     / x     / 413 U/L / x     / x      CARDIAC MARKERS ( 01 Nov 2020 05:48 )  0.022 ng/mL / x     / 887 U/L / x     / x      CARDIAC MARKERS ( 31 Oct 2020 22:20 )  0.027 ng/mL / x     / 1551 U/L / x     / x        PT/INR - ( 02 Nov 2020 05:52 )   PT: 22.2 sec;   INR: 1.92 ratio        PTT - ( 02 Nov 2020 05:52 )  PTT:32.5 sec    CAPILLARY BLOOD GLUCOSE      POCT Blood Glucose.: 92 mg/dL (02 Nov 2020 07:56)        RADIOLOGY & ADDITIONAL TESTS:   Xray Pelvis AP only (11.01.20 @ 19:38): no radiographic evidence of acute fracture or dislocation. If symptoms persist, consider CT or MRI exam to exclude occult fracture.    CT Abdomen and Pelvis w/ IV Cont (10.31.20 @ 23:51): No evidence of acute traumatic injury to the chest, abdomen, or pelvis.  3 cm indeterminate left interpolar renal lesion may be further characterized with ultrasound or MRI.  Small hiatal hernia.    CT Cervical Spine No Cont (10.31.20 @ 23:50) BRAIN: No intracranial hemorrhage, mass effect or depressed skull fracture. CERVICAL SPINE: No acute displaced fracture or traumatic malalignment. Old fractures of right anterior and posterior arches of C1.       Medical Student Progress Note discussed with PGY-1    CHIEF COMPLAINT & BRIEF HOSPITAL COURSE:  85 female PMhx of DM, HTN, DVT (on coumadin) and PSHx of a knee replacement reports to the ED s/p fall. Pt was in her usual state of health until this morning when she accidentally slipped after getting up from her chair on finishing her breakfast. As per the patient she lost her balance and fell on her L side. Daughter who was home helped her get up. Patient endorses pain to the L side of her back and neck, difficulty raising her legs. Patient states she was able to walk with her bucio after the fall. Patient has a home health aid 5 days a week.  in the ED EKG showed the NSR, CT head was negative and echo showed normal EF.    INTERVAL HPI/OVERNIGHT EVENTS:   Patient had no acute events overnight. She states she slept well and ate well. Patient endorses that he has left sided hip pain and back pain as well as pain in her legs, but is otherwise resting comfortably in bed.    MEDICATIONS  (STANDING):  amLODIPine   Tablet 5 milliGRAM(s) Oral daily  heparin   Injectable 5000 Unit(s) SubCutaneous every 12 hours  influenza   Vaccine 0.5 milliLiter(s) IntraMuscular once  insulin lispro (ADMELOG) corrective regimen sliding scale   SubCutaneous three times a day before meals  pantoprazole    Tablet 40 milliGRAM(s) Oral before breakfast  sodium chloride 0.9%. 1000 milliLiter(s) (80 mL/Hr) IV Continuous <Continuous>  warfarin 5 milliGRAM(s) Oral daily      MEDICATIONS  (PRN):      REVIEW OF SYSTEMS:  CONSTITUTIONAL: No fever, weight loss, or fatigue  RESPIRATORY: No cough, wheezing, chills or hemoptysis; No shortness of breath  CARDIOVASCULAR: No chest pain, palpitations, dizziness, or leg swelling  GASTROINTESTINAL: No abdominal pain. No nausea, vomiting, or hematemesis; No diarrhea or constipation. No melena or hematochezia.  GENITOURINARY: No dysuria or hematuria, urinary frequency  NEUROLOGICAL: No headaches, memory loss, loss of strength, numbness, or tremors  MSK: +Back pain, +Hip pain, +Left sided pain  SKIN: No itching, burning, rashes, or lesions     Vital Signs Last 24 Hrs  T(C): 36.8 (02 Nov 2020 05:04), Max: 36.8 (02 Nov 2020 05:04)  T(F): 98.3 (02 Nov 2020 05:04), Max: 98.3 (02 Nov 2020 05:04)  HR: 66 (02 Nov 2020 05:04) (51 - 72)  BP: 144/49 (02 Nov 2020 05:04) (116/47 - 147/54)  BP(mean): --  RR: 16 (02 Nov 2020 05:04) (16 - 16)  SpO2: 98% (02 Nov 2020 05:04) (98% - 100%)    PHYSICAL EXAMINATION:  GENERAL: NAD, resting comfortably in bed  HEAD:  Atraumatic, Normocephalic  EYES:  conjunctiva and sclera clear  NECK: Supple, No JVD, Normal thyroid  CHEST/LUNG: Clear to auscultation. Clear to percussion bilaterally; No rales, rhonchi, wheezing, or rubs  HEART: Regular rate and rhythm; No murmurs, rubs, or gallops  ABDOMEN: Soft, Nontender, Nondistended; Bowel sounds present  NERVOUS SYSTEM:  Alert & Oriented X3  MSK: 4/5 B/L Lower Extremities, 5/5 B/L Upper extremities, Chronic arthritic changes in hands B/L, 2+ Peripheral Pulses, No clubbing, cyanosis, or edema  SKIN: warm dry                          11.8   4.78  )-----------( 156      ( 02 Nov 2020 05:52 )             37.6     11-02    143  |  111<H>  |  22<H>  ----------------------------<  96  4.0   |  28  |  1.18    Ca    8.6      02 Nov 2020 05:52  Phos  2.6     11-02  Mg     2.1     11-02    TPro  6.4  /  Alb  2.5<L>  /  TBili  0.3  /  DBili  x   /  AST  24  /  ALT  35  /  AlkPhos  48  11-02    LIVER FUNCTIONS - ( 02 Nov 2020 05:52 )  Alb: 2.5 g/dL / Pro: 6.4 g/dL / ALK PHOS: 48 U/L / ALT: 35 U/L DA / AST: 24 U/L / GGT: x           CARDIAC MARKERS ( 02 Nov 2020 05:52 )  x     / x     / 413 U/L / x     / x      CARDIAC MARKERS ( 01 Nov 2020 05:48 )  0.022 ng/mL / x     / 887 U/L / x     / x      CARDIAC MARKERS ( 31 Oct 2020 22:20 )  0.027 ng/mL / x     / 1551 U/L / x     / x        PT/INR - ( 02 Nov 2020 05:52 )   PT: 22.2 sec;   INR: 1.92 ratio        PTT - ( 02 Nov 2020 05:52 )  PTT:32.5 sec    CAPILLARY BLOOD GLUCOSE      POCT Blood Glucose.: 92 mg/dL (02 Nov 2020 07:56)        RADIOLOGY & ADDITIONAL TESTS:   Xray Pelvis AP only (11.01.20 @ 19:38): no radiographic evidence of acute fracture or dislocation. If symptoms persist, consider CT or MRI exam to exclude occult fracture.    CT Abdomen and Pelvis w/ IV Cont (10.31.20 @ 23:51): No evidence of acute traumatic injury to the chest, abdomen, or pelvis.  3 cm indeterminate left interpolar renal lesion may be further characterized with ultrasound or MRI.  Small hiatal hernia.    CT Cervical Spine No Cont (10.31.20 @ 23:50) BRAIN: No intracranial hemorrhage, mass effect or depressed skull fracture. CERVICAL SPINE: No acute displaced fracture or traumatic malalignment. Old fractures of right anterior and posterior arches of C1.

## 2020-11-02 NOTE — PROGRESS NOTE ADULT - ATTENDING COMMENTS
agree with the above  patient was examined bed side in the attendance of the medical team , she was comfortable with mild pain in lft hip. pt refusing [facility] and preferring home PT.

## 2020-11-02 NOTE — PROGRESS NOTE ADULT - PROBLEM SELECTOR PLAN 7
Pt has PMH of DVT   On home coumadin ( dose not known)  reinstate home meds after confirmation   INR 2.03  F/U PT/INR daily Pt has PMH of DVT (on Coumadin)  - INR 1.92 today  - F/U PT/INR daily

## 2020-11-06 LAB — WARFARIN SERPL-MCNC: 0.7 MCG/ML — LOW (ref 1–10)

## 2021-08-17 ENCOUNTER — INPATIENT (INPATIENT)
Facility: HOSPITAL | Age: 86
LOS: 3 days | Discharge: NURSING, REHAB OR LONG-TERM CARE | DRG: 690 | End: 2021-08-21
Attending: HOSPITALIST | Admitting: HOSPITALIST
Payer: COMMERCIAL

## 2021-08-17 VITALS
SYSTOLIC BLOOD PRESSURE: 182 MMHG | HEIGHT: 66 IN | HEART RATE: 68 BPM | DIASTOLIC BLOOD PRESSURE: 84 MMHG | TEMPERATURE: 97 F | WEIGHT: 182.98 LBS | OXYGEN SATURATION: 97 % | RESPIRATION RATE: 18 BRPM

## 2021-08-17 DIAGNOSIS — E78.5 HYPERLIPIDEMIA, UNSPECIFIED: ICD-10-CM

## 2021-08-17 DIAGNOSIS — G44.209 TENSION-TYPE HEADACHE, UNSPECIFIED, NOT INTRACTABLE: ICD-10-CM

## 2021-08-17 DIAGNOSIS — B88.8 OTHER SPECIFIED INFESTATIONS: ICD-10-CM

## 2021-08-17 DIAGNOSIS — I82.409 ACUTE EMBOLISM AND THROMBOSIS OF UNSPECIFIED DEEP VEINS OF UNSPECIFIED LOWER EXTREMITY: ICD-10-CM

## 2021-08-17 DIAGNOSIS — M25.512 PAIN IN LEFT SHOULDER: ICD-10-CM

## 2021-08-17 DIAGNOSIS — N39.0 URINARY TRACT INFECTION, SITE NOT SPECIFIED: ICD-10-CM

## 2021-08-17 DIAGNOSIS — I10 ESSENTIAL (PRIMARY) HYPERTENSION: ICD-10-CM

## 2021-08-17 DIAGNOSIS — Z96.659 PRESENCE OF UNSPECIFIED ARTIFICIAL KNEE JOINT: Chronic | ICD-10-CM

## 2021-08-17 DIAGNOSIS — R53.1 WEAKNESS: ICD-10-CM

## 2021-08-17 DIAGNOSIS — Z98.51 TUBAL LIGATION STATUS: Chronic | ICD-10-CM

## 2021-08-17 DIAGNOSIS — E11.9 TYPE 2 DIABETES MELLITUS WITHOUT COMPLICATIONS: ICD-10-CM

## 2021-08-17 DIAGNOSIS — Z29.9 ENCOUNTER FOR PROPHYLACTIC MEASURES, UNSPECIFIED: ICD-10-CM

## 2021-08-17 DIAGNOSIS — I87.2 VENOUS INSUFFICIENCY (CHRONIC) (PERIPHERAL): ICD-10-CM

## 2021-08-17 LAB
ALBUMIN SERPL ELPH-MCNC: 3.3 G/DL — LOW (ref 3.5–5)
ALP SERPL-CCNC: 59 U/L — SIGNIFICANT CHANGE UP (ref 40–120)
ALT FLD-CCNC: 39 U/L DA — SIGNIFICANT CHANGE UP (ref 10–60)
ANION GAP SERPL CALC-SCNC: 1 MMOL/L — LOW (ref 5–17)
APPEARANCE UR: CLEAR — SIGNIFICANT CHANGE UP
AST SERPL-CCNC: 25 U/L — SIGNIFICANT CHANGE UP (ref 10–40)
BACTERIA # UR AUTO: ABNORMAL /HPF
BASOPHILS # BLD AUTO: 0.02 K/UL — SIGNIFICANT CHANGE UP (ref 0–0.2)
BASOPHILS NFR BLD AUTO: 0.4 % — SIGNIFICANT CHANGE UP (ref 0–2)
BILIRUB SERPL-MCNC: 0.6 MG/DL — SIGNIFICANT CHANGE UP (ref 0.2–1.2)
BILIRUB UR-MCNC: NEGATIVE — SIGNIFICANT CHANGE UP
BUN SERPL-MCNC: 15 MG/DL — SIGNIFICANT CHANGE UP (ref 7–18)
CALCIUM SERPL-MCNC: 9.1 MG/DL — SIGNIFICANT CHANGE UP (ref 8.4–10.5)
CHLORIDE SERPL-SCNC: 110 MMOL/L — HIGH (ref 96–108)
CO2 SERPL-SCNC: 30 MMOL/L — SIGNIFICANT CHANGE UP (ref 22–31)
COLOR SPEC: YELLOW — SIGNIFICANT CHANGE UP
CREAT SERPL-MCNC: 1.01 MG/DL — SIGNIFICANT CHANGE UP (ref 0.5–1.3)
DIFF PNL FLD: NEGATIVE — SIGNIFICANT CHANGE UP
EOSINOPHIL # BLD AUTO: 0.03 K/UL — SIGNIFICANT CHANGE UP (ref 0–0.5)
EOSINOPHIL NFR BLD AUTO: 0.7 % — SIGNIFICANT CHANGE UP (ref 0–6)
EPI CELLS # UR: ABNORMAL /HPF
GLUCOSE SERPL-MCNC: 88 MG/DL — SIGNIFICANT CHANGE UP (ref 70–99)
GLUCOSE UR QL: NEGATIVE — SIGNIFICANT CHANGE UP
HCT VFR BLD CALC: 40.7 % — SIGNIFICANT CHANGE UP (ref 34.5–45)
HGB BLD-MCNC: 13.1 G/DL — SIGNIFICANT CHANGE UP (ref 11.5–15.5)
IMM GRANULOCYTES NFR BLD AUTO: 0.4 % — SIGNIFICANT CHANGE UP (ref 0–1.5)
INR BLD: 2.17 RATIO — HIGH (ref 0.88–1.16)
KETONES UR-MCNC: NEGATIVE — SIGNIFICANT CHANGE UP
LEUKOCYTE ESTERASE UR-ACNC: ABNORMAL
LYMPHOCYTES # BLD AUTO: 1.17 K/UL — SIGNIFICANT CHANGE UP (ref 1–3.3)
LYMPHOCYTES # BLD AUTO: 26.2 % — SIGNIFICANT CHANGE UP (ref 13–44)
MAGNESIUM SERPL-MCNC: 2.4 MG/DL — SIGNIFICANT CHANGE UP (ref 1.6–2.6)
MCHC RBC-ENTMCNC: 30.6 PG — SIGNIFICANT CHANGE UP (ref 27–34)
MCHC RBC-ENTMCNC: 32.2 GM/DL — SIGNIFICANT CHANGE UP (ref 32–36)
MCV RBC AUTO: 95.1 FL — SIGNIFICANT CHANGE UP (ref 80–100)
MONOCYTES # BLD AUTO: 0.6 K/UL — SIGNIFICANT CHANGE UP (ref 0–0.9)
MONOCYTES NFR BLD AUTO: 13.4 % — SIGNIFICANT CHANGE UP (ref 2–14)
NEUTROPHILS # BLD AUTO: 2.63 K/UL — SIGNIFICANT CHANGE UP (ref 1.8–7.4)
NEUTROPHILS NFR BLD AUTO: 58.9 % — SIGNIFICANT CHANGE UP (ref 43–77)
NITRITE UR-MCNC: NEGATIVE — SIGNIFICANT CHANGE UP
NRBC # BLD: 0 /100 WBCS — SIGNIFICANT CHANGE UP (ref 0–0)
PH UR: 8 — SIGNIFICANT CHANGE UP (ref 5–8)
PLATELET # BLD AUTO: 157 K/UL — SIGNIFICANT CHANGE UP (ref 150–400)
POTASSIUM SERPL-MCNC: 4.7 MMOL/L — SIGNIFICANT CHANGE UP (ref 3.5–5.3)
POTASSIUM SERPL-SCNC: 4.7 MMOL/L — SIGNIFICANT CHANGE UP (ref 3.5–5.3)
PROT SERPL-MCNC: 7.6 G/DL — SIGNIFICANT CHANGE UP (ref 6–8.3)
PROT UR-MCNC: NEGATIVE — SIGNIFICANT CHANGE UP
PROTHROM AB SERPL-ACNC: 24.9 SEC — HIGH (ref 10.6–13.6)
RAPID RVP RESULT: SIGNIFICANT CHANGE UP
RBC # BLD: 4.28 M/UL — SIGNIFICANT CHANGE UP (ref 3.8–5.2)
RBC # FLD: 13.4 % — SIGNIFICANT CHANGE UP (ref 10.3–14.5)
RBC CASTS # UR COMP ASSIST: SIGNIFICANT CHANGE UP /HPF (ref 0–2)
SARS-COV-2 RNA SPEC QL NAA+PROBE: SIGNIFICANT CHANGE UP
SODIUM SERPL-SCNC: 141 MMOL/L — SIGNIFICANT CHANGE UP (ref 135–145)
SP GR SPEC: 1.01 — SIGNIFICANT CHANGE UP (ref 1.01–1.02)
TROPONIN I SERPL-MCNC: <0.015 NG/ML — SIGNIFICANT CHANGE UP (ref 0–0.04)
UROBILINOGEN FLD QL: NEGATIVE — SIGNIFICANT CHANGE UP
WBC # BLD: 4.47 K/UL — SIGNIFICANT CHANGE UP (ref 3.8–10.5)
WBC # FLD AUTO: 4.47 K/UL — SIGNIFICANT CHANGE UP (ref 3.8–10.5)
WBC UR QL: ABNORMAL /HPF (ref 0–5)

## 2021-08-17 PROCEDURE — 71045 X-RAY EXAM CHEST 1 VIEW: CPT | Mod: 26

## 2021-08-17 PROCEDURE — 99285 EMERGENCY DEPT VISIT HI MDM: CPT

## 2021-08-17 PROCEDURE — 70450 CT HEAD/BRAIN W/O DYE: CPT | Mod: 26,MA

## 2021-08-17 PROCEDURE — 99223 1ST HOSP IP/OBS HIGH 75: CPT | Mod: GC

## 2021-08-17 RX ORDER — METOPROLOL TARTRATE 50 MG
25 TABLET ORAL EVERY 12 HOURS
Refills: 0 | Status: DISCONTINUED | OUTPATIENT
Start: 2021-08-17 | End: 2021-08-21

## 2021-08-17 RX ORDER — WARFARIN SODIUM 2.5 MG/1
4 TABLET ORAL
Refills: 0 | Status: DISCONTINUED | OUTPATIENT
Start: 2021-08-17 | End: 2021-08-21

## 2021-08-17 RX ORDER — SIMVASTATIN 20 MG/1
20 TABLET, FILM COATED ORAL AT BEDTIME
Refills: 0 | Status: DISCONTINUED | OUTPATIENT
Start: 2021-08-17 | End: 2021-08-21

## 2021-08-17 RX ORDER — ERYTHROMYCIN BASE 5 MG/GRAM
1 OINTMENT (GRAM) OPHTHALMIC (EYE) THREE TIMES A DAY
Refills: 0 | Status: DISCONTINUED | OUTPATIENT
Start: 2021-08-17 | End: 2021-08-21

## 2021-08-17 RX ORDER — LOSARTAN POTASSIUM 100 MG/1
100 TABLET, FILM COATED ORAL DAILY
Refills: 0 | Status: DISCONTINUED | OUTPATIENT
Start: 2021-08-17 | End: 2021-08-21

## 2021-08-17 RX ORDER — SODIUM CHLORIDE 9 MG/ML
1000 INJECTION, SOLUTION INTRAVENOUS
Refills: 0 | Status: COMPLETED | OUTPATIENT
Start: 2021-08-17 | End: 2021-08-18

## 2021-08-17 RX ORDER — CEFTRIAXONE 500 MG/1
1000 INJECTION, POWDER, FOR SOLUTION INTRAMUSCULAR; INTRAVENOUS EVERY 24 HOURS
Refills: 0 | Status: COMPLETED | OUTPATIENT
Start: 2021-08-18 | End: 2021-08-20

## 2021-08-17 RX ORDER — HYDRALAZINE HCL 50 MG
10 TABLET ORAL ONCE
Refills: 0 | Status: COMPLETED | OUTPATIENT
Start: 2021-08-17 | End: 2021-08-17

## 2021-08-17 RX ORDER — WARFARIN SODIUM 2.5 MG/1
2 TABLET ORAL
Refills: 0 | Status: DISCONTINUED | OUTPATIENT
Start: 2021-08-17 | End: 2021-08-21

## 2021-08-17 RX ORDER — CEFTRIAXONE 500 MG/1
1000 INJECTION, POWDER, FOR SOLUTION INTRAMUSCULAR; INTRAVENOUS ONCE
Refills: 0 | Status: COMPLETED | OUTPATIENT
Start: 2021-08-17 | End: 2021-08-17

## 2021-08-17 RX ORDER — ACETAMINOPHEN 500 MG
650 TABLET ORAL EVERY 6 HOURS
Refills: 0 | Status: DISCONTINUED | OUTPATIENT
Start: 2021-08-17 | End: 2021-08-21

## 2021-08-17 RX ORDER — GABAPENTIN 400 MG/1
200 CAPSULE ORAL EVERY 8 HOURS
Refills: 0 | Status: DISCONTINUED | OUTPATIENT
Start: 2021-08-17 | End: 2021-08-21

## 2021-08-17 RX ORDER — CEFTRIAXONE 500 MG/1
INJECTION, POWDER, FOR SOLUTION INTRAMUSCULAR; INTRAVENOUS
Refills: 0 | Status: COMPLETED | OUTPATIENT
Start: 2021-08-17 | End: 2021-08-21

## 2021-08-17 RX ADMIN — Medication 10 MILLIGRAM(S): at 20:45

## 2021-08-17 NOTE — ED PROVIDER NOTE - OBJECTIVE STATEMENT
86 yr old female with hx of HTN, DM, OA, DVT on coumadin presents to ed from home with daughter for generalized weakness and frontal headache x 1 day. no fever, no n/v, no changes in mental state, no cough, no sob, no abd pain, no diarrhea. pt otherwise at baseline

## 2021-08-17 NOTE — H&P ADULT - ASSESSMENT
87 yo F with hx of DM, HTN, HLD, OA , DVT on coumadin came in with chief complain of generalized weakness. Admitted for further work up

## 2021-08-17 NOTE — ED ADULT NURSE NOTE - NSIMPLEMENTINTERV_GEN_ALL_ED
Implemented All Fall with Harm Risk Interventions:  [place] to call system. Call bell, personal items and telephone within reach. Instruct patient to call for assistance. Room bathroom lighting operational. Non-slip footwear when patient is off stretcher. Physically safe environment: no spills, clutter or unnecessary equipment. Stretcher in lowest position, wheels locked, appropriate side rails in place. Provide visual cue, wrist band, yellow gown, etc. Monitor gait and stability. Monitor for mental status changes and reorient to person, place, and time. Review medications for side effects contributing to fall risk. Reinforce activity limits and safety measures with patient and family. Provide visual clues: red socks.

## 2021-08-17 NOTE — H&P ADULT - NSICDXFAMILYHX_GEN_ALL_CORE_FT
FAMILY HISTORY:  Sibling  Still living? Unknown  FH: diabetes mellitus, Age at diagnosis: Age Unknown

## 2021-08-17 NOTE — H&P ADULT - PROBLEM SELECTOR PLAN 2
Frontal headache , denies photophobia   Resolve with photophobia  DDX: Tension headache vs HTN (SBP was in 190s in ED, had meds in am for BP)  CTH neg for hemorrhage   Tylenol 650mg q6hr PRN

## 2021-08-17 NOTE — H&P ADULT - NSHPPHYSICALEXAM_GEN_ALL_CORE
Vital Signs Last 24 Hrs  T(C): 36.3 (17 Aug 2021 15:25), Max: 36.3 (17 Aug 2021 15:25)  T(F): 97.3 (17 Aug 2021 15:25), Max: 97.3 (17 Aug 2021 15:25)  HR: 68 (17 Aug 2021 15:25) (68 - 68)  BP: 182/84 (17 Aug 2021 15:25) (182/84 - 182/84)  BP(mean): --  RR: 18 (17 Aug 2021 15:25) (18 - 18)  SpO2: 97% (17 Aug 2021 15:25) (97% - 97%)    GENERAL: NAD, appears comfortable  EYES: EOMI, PERRLA,   NECK: Supple, No JVD  CHEST/LUNG: Clear to auscultation b/l  No rales, rhonchi, wheezing   HEART: Regular rate and rhythm; No murmurs, +ve S1 S2   ABDOMEN: Soft, Nontender, Nondistended; Bowel sounds present  NERVOUS SYSTEM:  Alert & Oriented X3, sensations are intact  UE: 5/5 strength in forearms  but unable to reach over head with both arms left is worse than right   LE: 4-5/5 strength   EXTREMITIES:   Chronic venous stasis dermatitis , small skin break noted on LLe and RLE has very mild pitting edema but no swelling or tenderness was noted   LYMPH NODES : non palpable   PSYCH: normal mood and affect Vital Signs Last 24 Hrs  T(C): 36.3 (17 Aug 2021 15:25), Max: 36.3 (17 Aug 2021 15:25)  T(F): 97.3 (17 Aug 2021 15:25), Max: 97.3 (17 Aug 2021 15:25)  HR: 68 (17 Aug 2021 15:25) (68 - 68)  BP: 182/84 (17 Aug 2021 15:25) (182/84 - 182/84)  BP(mean): --  RR: 18 (17 Aug 2021 15:25) (18 - 18)  SpO2: 97% (17 Aug 2021 15:25) (97% - 97%)    GENERAL: NAD, appears comfortable  EYES: EOMI, PERRLA,   NECK: Supple, No JVD  CHEST/LUNG: Clear to auscultation b/l  No rales, rhonchi, wheezing   HEART: Regular rate and rhythm; No murmurs, +ve S1 S2   ABDOMEN: Soft, Nontender, Nondistended; Bowel sounds present  NERVOUS SYSTEM:  Alert & Oriented X3, sensations are intact  UE: 5/5 strength in forearms  but unable to reach over head with both arms left is worse than right   LE: 4-5/5 strength   EXTREMITIES: Arthritis noted on both hands , [place] nodes noted on distal phalanges   Chronic venous stasis dermatitis , small skin break noted on LLe and RLE has very mild pitting edema but no swelling or tenderness was noted   Bunion +ve on feet  LYMPH NODES : non palpable   PSYCH: normal mood and affect

## 2021-08-17 NOTE — H&P ADULT - PARTICIPANTS
Discussed GOC with patient. Pt says she wants everything to be done to save her life. FULL CODE. Pt was explained at length the risks and benefits of CPR and intubation. Pt and daughter agreed on FULL CODE status/Patient/Family

## 2021-08-17 NOTE — H&P ADULT - PROBLEM SELECTOR PROBLEM 2
Diabetes mellitus Chronic left shoulder pain Generalized weakness Tension-type headache, unspecified, not intractable

## 2021-08-17 NOTE — ED PROVIDER NOTE - ENMT, MLM
Airway patent, Nasal mucosa clear. Mouth with normal mucosa. Throat has no vesicles, no oropharyngeal exudates and uvula is midline. 10

## 2021-08-17 NOTE — H&P ADULT - PROBLEM SELECTOR PLAN 1
Came in with generalized weakness , denies any urinary symptoms   UA mildly positive , weakness could be from infection   Starting on IV abx Ceftriaxone for 3 days   F/U UCx

## 2021-08-17 NOTE — H&P ADULT - PROBLEM SELECTOR PLAN 3
DDX: Deconditioning vs dehydration vs hypothyroidism vs nutritional def   Orthostatic neg (performed by resident) Lying 192/76, 61HR >> sitting 190/73, 62HR>> standing 190/69, 63HR  Will give gentle iv fluids for 12 hours  F/U TSH, Vit B12 and Vit D  PT Consulted DDX: Deconditioning vs dehydration vs hypothyroidism vs nutritional def vs medication induce   Orthostatic neg (performed by resident) Lying 192/76, 61HR >> sitting 190/73, 62HR>> standing 190/69, 63HR  Will give gentle iv fluids for 12 hours  At home on gabapentin 300mg TID, will restart on 200mg TID  F/U TSH, Vit B12 and Vit D  PT Consulted

## 2021-08-17 NOTE — H&P ADULT - NSHPSOURCEINFORD_GEN_ALL_CORE
[facility]  [facility]  [address] [place] [id number]  Phone: [phone]  Fax: [phone]  Follow Up Time:

## 2021-08-17 NOTE — H&P ADULT - PROBLEM SELECTOR PLAN 6
Not on any medications at home, PCP stopped it   F/U HgbA1C   Glucose on admission 88 , not starting on any sliding scale for now

## 2021-08-17 NOTE — H&P ADULT - PROBLEM SELECTOR PLAN 10
Coumadin 2mg Mon-Wed   Coumadin 4mg Thurs- Sat In ED triage noted to have bed bug on pt's cloth   Isolation for now   No signs of bug bite on skin   Primary team to call Infection control in am in order to take off from isolation

## 2021-08-17 NOTE — ED PROVIDER NOTE - CONSTITUTIONAL, MLM
Well appearing, awake, alert, oriented to person, place, time/situation and in no apparent distress. elderly normal...

## 2021-08-17 NOTE — H&P ADULT - HISTORY OF PRESENT ILLNESS
87 yo F with PMHx of HTN , DM (not on any meds recently), DVT, b/l knee replacement , HLD, OA of knees and hands came in with chief complain of generalized weakness and headache for 1 week. Daughter was present at bedside during the interview. Pt endorsed to have getting weaker for the past 1 week with frontal headaches intermittently. She is unable to enjoy the things she used to enjoy before. She cam walk 2-3 blocks with  walker without any difficulty. She denies any chest pain, SOB, change in appetite emotional trauma, fall at home, constipation, change in life style, back pain, dysuria, cough, fevers, chills. As per daughter weather is so hot but pt is not drinking enough water now a days.     Of note: Pt had DVT on left leg after knee replacement. On Coumadin. Checked INR every 3 weeks. Last appointment with PCP was in Feb.   In ED: Pt had an EKG which showed bradycardia with LVH. CTH neg and Chest X ray neg.  87 yo F with PMHx of HTN , DM (not on any meds recently), DVT, b/l knee replacement , HLD, OA of knees and hands came in with chief complain of generalized weakness and headache for 1 week. Daughter was present at bedside during the interview. Pt endorsed to have getting weaker for the past 1 week with frontal headaches intermittently. She is unable to enjoy the things she used to enjoy before. She cam walk 2-3 blocks with walker without any difficulty. She denies any chest pain, SOB, change in appetite emotional trauma, fall at home, constipation, change in life style, back pain, dysuria, cough, fevers, chills. As per daughter weather is so hot but pt is not drinking enough water now a days. Pt has chronic pain on left shoulder.     Of note: Pt had DVT on left leg after knee replacement. On Coumadin. Checked INR every 3 weeks. Last appointment with PCP was in Feb.   In ED: Pt had an EKG which showed bradycardia with LVH. CTH neg and Chest X ray neg.  85 yo F with PMHx of HTN , DM (not on any meds recently), DVT, b/l knee replacement , HLD, OA of knees and hands came in with chief complain of generalized weakness and headache for 1 week. Daughter was present at bedside during the interview. Pt endorsed to have getting weaker for the past 1 week with frontal headaches intermittently. She is unable to enjoy the things she used to enjoy before. She cam walk 2-3 blocks with walker without any difficulty. She denies any chest pain, SOB, change in appetite emotional trauma, fall at home, constipation, change in life style, back pain, dysuria, cough, fevers, chills. As per daughter weather is so hot but pt is not drinking enough water now a days. Pt has chronic pain on left shoulder.     Of note: Pt had DVT on left leg after knee replacement. On Coumadin. Checked INR every 3 weeks. Last appointment with PCP was in Feb.   In ED: Pt had an EKG which showed bradycardia with LVH. CTH neg and Chest X ray neg. Hydralazine PO 10mg 1 dose.

## 2021-08-17 NOTE — ED PROVIDER NOTE - CLINICAL SUMMARY MEDICAL DECISION MAKING FREE TEXT BOX
86 yr old female with hx of HTN, DM, OA, DVT on coumadin presents to ed from home with daughter for generalized weakness and frontal headache x 1 day. no fever, no n/v, no changes in mental state, no cough, no sob, no abd pain, no diarrhea. pt otherwise at baseline    weakness r/o lytes imbalance vs uti vs atypical acs vs anemia vs viral pna vs deconditioning. - labs, ct head, ua, ekg, cxr, re-assess

## 2021-08-17 NOTE — H&P ADULT - ATTENDING COMMENTS
Patient was seen and examined at bedside today. Reports headache for 2 days, and generalized weakness for 2 weeks mostly in her legs. Headache is 8/10 in severity, persistent throughout the day without any aggrav or allev factor. Denies any N/V, photosensitivity. The headache radiates to neck and face. Denies any recent falls or LOC. She also complains of Left shoulder pain and pain around the dry healing ulcer of left shin.   Vitals noted, BP elevated systolic 190s, Patient reports she took her both antihypertensives this morning. Orthostatic BP done at bedside neg    P/E:  NAD, pleasant elderly lady  Head: Atraumatic, normocephalic   EOMI  AAOx3, strength in all 4 extremities 4/5, left sided facial drooping- not new   Lungs CTABL  S1S2 WNL, no MRG  Abd soft, nontender, BS present   BL LE no edema, left calf mild tenderness, Left shoulder restricted movement due to pain     Labs noted     A/P:  Headache   Generalized deconditioning leading to ambulatory dysfunction   left shoulder pain   H/O DVT  Uncontrolled HTN   DM  H/O thyroid nodule and renal cyst     Plan:   Patient's headache unlikely due to migraine. Could be tension headache or radiating pain from shoulder or due to uncontrolled HTN  Will start standing Tylenol 650mg Q6hrs   CT head neg for any hemorrhage or stroke   Cont home meds for BP, will add one small dose of hydralazine for tonight for better control of BP  Left shoulder xray  BL LE doppler   Cont home dose coumarin   Obtain TSH, B12, folate   ISS   Decrease Gabapentin to 200mg TID  PT eval   Disposition plan Patient was seen and examined at bedside today. Reports headache for 2 days, and generalized weakness for 2 weeks mostly in her legs. Headache is 8/10 in severity, persistent throughout the day without any aggrav or allev factor. Denies any N/V, photosensitivity. The headache radiates to neck and face. Denies any recent falls or LOC. She also complains of Left shoulder pain and pain around the dry healing ulcer of left shin.   Vitals noted, BP elevated systolic 190s, Patient reports she took her both antihypertensives this morning. Orthostatic BP done at bedside neg    P/E:  NAD, pleasant elderly lady  Head: Atraumatic, normocephalic   EOMI  AAOx3, strength in all 4 extremities 4/5, left sided facial drooping- not new   Lungs CTABL  S1S2 WNL, no MRG  Abd soft, nontender, BS present   BL LE no edema, left calf mild tenderness, Left shoulder restricted movement due to pain     Labs noted     A/P:  UTI  Headache   Generalized deconditioning leading to ambulatory dysfunction   left shoulder pain   H/O DVT  Uncontrolled HTN   DM  H/O thyroid nodule and renal cyst     Plan:   Patient's headache unlikely due to migraine. Could be tension headache or radiating pain from shoulder or due to uncontrolled HTN  Will start standing Tylenol 650mg Q6hrs   CT head neg for any hemorrhage or stroke   Cont home meds for BP, will add one small dose of hydralazine for tonight for better control of BP  Left shoulder xray  BL LE doppler   Cont home dose coumarin   Obtain TSH, B12, folate   ISS   Decrease Gabapentin to 200mg TID  UA weakly positive, although patient denies any urinary symptoms will treat with ceftriaxone for 3days(collect urine cx before starting abx) as the generalized weakness could be due to UTI  PT eval   Disposition plan

## 2021-08-17 NOTE — ED ADULT TRIAGE NOTE - CHIEF COMPLAINT QUOTE
biba c/o general weakness x 3 days. bedbug noted on shirt in triage .  ems reports saw bedbug on pt's pillow at home

## 2021-08-17 NOTE — H&P ADULT - PROBLEM SELECTOR PLAN 7
Irbesartan 300mg Toprol 25mg OD at home   Starting on losartan 100mg and Metoprolol tartrate 25mg BID   Monitor HR being on beta blocker as pt HR in 60s mostly

## 2021-08-18 ENCOUNTER — TRANSCRIPTION ENCOUNTER (OUTPATIENT)
Age: 86
End: 2021-08-18

## 2021-08-18 LAB
24R-OH-CALCIDIOL SERPL-MCNC: 31.5 NG/ML — SIGNIFICANT CHANGE UP (ref 30–80)
A1C WITH ESTIMATED AVERAGE GLUCOSE RESULT: 6.3 % — HIGH (ref 4–5.6)
ALBUMIN SERPL ELPH-MCNC: 2.7 G/DL — LOW (ref 3.5–5)
ALP SERPL-CCNC: 56 U/L — SIGNIFICANT CHANGE UP (ref 40–120)
ALT FLD-CCNC: 34 U/L DA — SIGNIFICANT CHANGE UP (ref 10–60)
ANION GAP SERPL CALC-SCNC: 4 MMOL/L — LOW (ref 5–17)
AST SERPL-CCNC: 22 U/L — SIGNIFICANT CHANGE UP (ref 10–40)
BILIRUB SERPL-MCNC: 0.3 MG/DL — SIGNIFICANT CHANGE UP (ref 0.2–1.2)
BUN SERPL-MCNC: 18 MG/DL — SIGNIFICANT CHANGE UP (ref 7–18)
CALCIUM SERPL-MCNC: 8.7 MG/DL — SIGNIFICANT CHANGE UP (ref 8.4–10.5)
CHLORIDE SERPL-SCNC: 110 MMOL/L — HIGH (ref 96–108)
CO2 SERPL-SCNC: 29 MMOL/L — SIGNIFICANT CHANGE UP (ref 22–31)
COVID-19 SPIKE DOMAIN AB INTERP: POSITIVE
COVID-19 SPIKE DOMAIN ANTIBODY RESULT: >250 U/ML — HIGH
CREAT SERPL-MCNC: 0.81 MG/DL — SIGNIFICANT CHANGE UP (ref 0.5–1.3)
CULTURE RESULTS: SIGNIFICANT CHANGE UP
ESTIMATED AVERAGE GLUCOSE: 134 MG/DL — HIGH (ref 68–114)
GLUCOSE SERPL-MCNC: 98 MG/DL — SIGNIFICANT CHANGE UP (ref 70–99)
HCT VFR BLD CALC: 40.5 % — SIGNIFICANT CHANGE UP (ref 34.5–45)
HGB BLD-MCNC: 12.9 G/DL — SIGNIFICANT CHANGE UP (ref 11.5–15.5)
MAGNESIUM SERPL-MCNC: 2.3 MG/DL — SIGNIFICANT CHANGE UP (ref 1.6–2.6)
MCHC RBC-ENTMCNC: 30.3 PG — SIGNIFICANT CHANGE UP (ref 27–34)
MCHC RBC-ENTMCNC: 31.9 GM/DL — LOW (ref 32–36)
MCV RBC AUTO: 95.1 FL — SIGNIFICANT CHANGE UP (ref 80–100)
NRBC # BLD: 0 /100 WBCS — SIGNIFICANT CHANGE UP (ref 0–0)
PHOSPHATE SERPL-MCNC: 3.1 MG/DL — SIGNIFICANT CHANGE UP (ref 2.5–4.5)
PLATELET # BLD AUTO: 154 K/UL — SIGNIFICANT CHANGE UP (ref 150–400)
POTASSIUM SERPL-MCNC: 4.4 MMOL/L — SIGNIFICANT CHANGE UP (ref 3.5–5.3)
POTASSIUM SERPL-SCNC: 4.4 MMOL/L — SIGNIFICANT CHANGE UP (ref 3.5–5.3)
PROT SERPL-MCNC: 7 G/DL — SIGNIFICANT CHANGE UP (ref 6–8.3)
RBC # BLD: 4.26 M/UL — SIGNIFICANT CHANGE UP (ref 3.8–5.2)
RBC # FLD: 13.6 % — SIGNIFICANT CHANGE UP (ref 10.3–14.5)
SARS-COV-2 IGG+IGM SERPL QL IA: >250 U/ML — HIGH
SARS-COV-2 IGG+IGM SERPL QL IA: POSITIVE
SODIUM SERPL-SCNC: 143 MMOL/L — SIGNIFICANT CHANGE UP (ref 135–145)
SPECIMEN SOURCE: SIGNIFICANT CHANGE UP
TSH SERPL-MCNC: 0.97 UU/ML — SIGNIFICANT CHANGE UP (ref 0.34–4.82)
VIT B12 SERPL-MCNC: >2000 PG/ML — HIGH (ref 232–1245)
WBC # BLD: 4.35 K/UL — SIGNIFICANT CHANGE UP (ref 3.8–10.5)
WBC # FLD AUTO: 4.35 K/UL — SIGNIFICANT CHANGE UP (ref 3.8–10.5)

## 2021-08-18 PROCEDURE — 99232 SBSQ HOSP IP/OBS MODERATE 35: CPT | Mod: GC

## 2021-08-18 PROCEDURE — 93970 EXTREMITY STUDY: CPT | Mod: 26

## 2021-08-18 RX ORDER — HYDRALAZINE HCL 50 MG
10 TABLET ORAL THREE TIMES A DAY
Refills: 0 | Status: DISCONTINUED | OUTPATIENT
Start: 2021-08-18 | End: 2021-08-19

## 2021-08-18 RX ADMIN — LOSARTAN POTASSIUM 100 MILLIGRAM(S): 100 TABLET, FILM COATED ORAL at 09:28

## 2021-08-18 RX ADMIN — SIMVASTATIN 20 MILLIGRAM(S): 20 TABLET, FILM COATED ORAL at 00:42

## 2021-08-18 RX ADMIN — Medication 25 MILLIGRAM(S): at 18:50

## 2021-08-18 RX ADMIN — GABAPENTIN 200 MILLIGRAM(S): 400 CAPSULE ORAL at 06:41

## 2021-08-18 RX ADMIN — CEFTRIAXONE 100 MILLIGRAM(S): 500 INJECTION, POWDER, FOR SOLUTION INTRAMUSCULAR; INTRAVENOUS at 20:05

## 2021-08-18 RX ADMIN — Medication 1 APPLICATION(S): at 13:55

## 2021-08-18 RX ADMIN — Medication 1 APPLICATION(S): at 06:41

## 2021-08-18 RX ADMIN — GABAPENTIN 200 MILLIGRAM(S): 400 CAPSULE ORAL at 13:54

## 2021-08-18 RX ADMIN — CEFTRIAXONE 1000 MILLIGRAM(S): 500 INJECTION, POWDER, FOR SOLUTION INTRAMUSCULAR; INTRAVENOUS at 00:42

## 2021-08-18 RX ADMIN — GABAPENTIN 200 MILLIGRAM(S): 400 CAPSULE ORAL at 00:42

## 2021-08-18 RX ADMIN — SODIUM CHLORIDE 60 MILLILITER(S): 9 INJECTION, SOLUTION INTRAVENOUS at 13:55

## 2021-08-18 RX ADMIN — WARFARIN SODIUM 2 MILLIGRAM(S): 2.5 TABLET ORAL at 08:52

## 2021-08-18 RX ADMIN — SODIUM CHLORIDE 60 MILLILITER(S): 9 INJECTION, SOLUTION INTRAVENOUS at 01:14

## 2021-08-18 NOTE — DISCHARGE NOTE PROVIDER - HOSPITAL COURSE
Pt is a 87 yo F with PMHx of HTN , DM (not on any meds recently), DVT, b/l knee replacement, HLD, OA of knees and hands came in with chief complain of generalized weakness and headache for 1 week. Daughter was present at bedside during the interview. Pt endorsed to have gotten weaker for the past 1 week with frontal headaches intermittently.  She can walk 2-3 blocks with walker without any difficulty. She denies any chest pain, SOB, fall at home, constipation, change in life style, back pain, dysuria, cough, fevers, chills.  Pt has chronic pain on left shoulder. Pt had DVT on left leg after knee replacement. On Coumadin and checked INR every 3 weeks. Last appointment with PCP was in Feb.      On initial In ED work up, EKG showed bradycardia with LVH. CTH neg and Chest X ray neg. Hydralazine PO 10mg 1 dose.  Noted bed bug on pt's clothing per triage note and at pt's house per EMS. Pt was admitted for UTI and started on IV Ceftriaxone.    Her urine culture::::::::::::  Lower extremities doppler::::::::::  Shoulder Xray::::::::     INCOMPLETE      Please note that this a brief summary of hospital course, please refer to daily progress notes and consult notes for full course and events. Pt is a 87 yo F with PMHx of HTN , DM (not on any meds recently), DVT, b/l knee replacement, HLD, OA of knees and hands came in with chief complain of generalized weakness and headache for 1 week. Daughter was present at bedside during the interview. Pt endorsed to have gotten weaker for the past 1 week with frontal headaches intermittently.  She can walk 2-3 blocks with walker without any difficulty. She denies any chest pain, SOB, fall at home, constipation, change in life style, back pain, dysuria, cough, fevers, chills.  Pt has chronic pain on left shoulder. Pt had DVT on left leg after knee replacement. On Coumadin and checked INR every 3 weeks. Last appointment with PCP was in Feb.      On initial In ED work up, EKG showed bradycardia with LVH. CTH neg and Chest X ray neg. Hydralazine PO 10mg 1 dose.  Noted bed bug on pt's clothing per triage note and at pt's house per EMS. Pt was admitted for UTI and started on IV Ceftriaxone.    Her urine culture negative  Lower extremities doppler negative for DVT in RLE, lilely residual of prior DVT observed in LLE.     Completed course of antibiotics.  PT consulted, evaluated and recommends LEONA.  CM ensured appropriate placement.    Discussed with attending.    Patient medically stable for discharge.    For full hospital course, please see medical record.

## 2021-08-18 NOTE — DISCHARGE NOTE PROVIDER - NSDCMRMEDTOKEN_GEN_ALL_CORE_FT
clotrimazole 1% topical cream (obsolete): Apply topically to affected area 2 times a day  erythromycin 0.5% ophthalmic ointment: 1 application to each affected eye 3 times a day Right  Flonase 50 mcg/inh nasal spray: 1 spray(s) nasal once a day  gabapentin 300 mg oral capsule: 1 cap(s) orally 3 times a day  irbesartan 300 mg oral tablet: 1 tab(s) orally once a day  metoprolol succinate 25 mg oral capsule, extended release: 1 cap(s) orally once a day  risedronate 150 mg oral tablet: 1 tab(s) orally once a month  simvastatin 20 mg oral tablet: 1 tab(s) orally once a day (at bedtime)  warfarin 2 mg oral tablet: 1 tab(s) orally once a day MONDAY THROUGH WEDNESDAY  warfarin 4 mg oral tablet: 1 tab(s) orally once a day THURSDAY THROUGH SUNDAY   acetaminophen 325 mg oral tablet: 2 tab(s) orally every 6 hours, As needed, Mild Pain (1 - 3), Moderate Pain (4 - 6)  collagenase 250 units/g topical ointment: 1 application topically once a day  erythromycin 0.5% ophthalmic ointment: 1 application to each affected eye 3 times a day  gabapentin 100 mg oral capsule: 2 cap(s) orally every 8 hours  hydrALAZINE 25 mg oral tablet: 1 tab(s) orally every 6 hours, As needed, Systolic blood pressure &gt; 180  losartan 100 mg oral tablet: 1 tab(s) orally once a day  metoprolol tartrate 25 mg oral tablet: 1 tab(s) orally every 12 hours  NIFEdipine 30 mg oral tablet, extended release: 1 tab(s) orally once a day  polyethylene glycol 3350 oral powder for reconstitution: 17 gram(s) orally once a day, As needed, Constipation  senna oral tablet: 2 tab(s) orally once a day (at bedtime)  simvastatin 20 mg oral tablet: 1 tab(s) orally once a day (at bedtime)  warfarin 2 mg oral tablet: 1 tab(s) orally MONDAY, TUESDAY,WEDNESDAY  warfarin 4 mg oral tablet: 1 tab(s) orally THURSDAY/FRIDAY/SATURDAY/SUNDAY

## 2021-08-18 NOTE — DISCHARGE NOTE PROVIDER - NSDCFUADDINST_GEN_ALL_CORE_FT
Please change dressing M/W/Sun  Apply santyl, Alleyvn pad, and ACE to LE   Recommend elevation of LE daily

## 2021-08-18 NOTE — GOALS OF CARE CONVERSATION - ADVANCED CARE PLANNING - CONVERSATION DETAILS
GOC initiated with pt and daughter at [facility] at bedside, pt stated that "I want you to do everything that you can to keep me alive for as long as you can."  Pt requested to be FULL CODE.

## 2021-08-18 NOTE — CHART NOTE - NSCHARTNOTEFT_GEN_A_CORE
Event:  "No acute events overnight"      Brief HPI: As per chart review and per pt, pt 87 yo F with PMHx of HTN , DM (not on any meds recently), DVT, b/l knee replacement , HLD, OA of knees and hands came in with chief complain of generalized weakness and headache for 1 week. Daughter was present at bedside during the interview. Pt endorsed to have getting weaker for the past 1 week with frontal headaches intermittently. She is unable to enjoy the things she used to enjoy before. She cam walk 2-3 blocks with walker without any difficulty. She denies any chest pain, SOB, change in appetite emotional trauma, fall at home, constipation, change in life style, back pain, dysuria, cough, fevers, chills. As per daughter weather is so hot but pt is not drinking enough water now a days. Pt has chronic pain on left shoulder.  Pt had DVT on left leg after knee replacement. On Coumadin. Checked INR every 3 weeks. Last appointment with PCP was in Feb.      On initial In ED work up, EKG showed bradycardia with LVH. CTH neg and Chest X ray neg. Hydralazine PO 10mg 1 dose.  Noted bed bug on pt's clothing per triage note and at pt's house per EMS.     Objective: Vital signs last  24hrs  Vital Signs Last 24 Hrs  T(C): 36.4 (18 Aug 2021 08:32), Max: 36.4 (17 Aug 2021 20:30)  T(F): 97.6 (18 Aug 2021 08:32), Max: 97.6 (18 Aug 2021 08:32)  HR: 65 (18 Aug 2021 09:26) (56 - 70)  BP: 179/78 (18 Aug 2021 09:26) (143/75 - 182/84)  BP(mean): --  RR: 18 (18 Aug 2021 08:32) (17 - 18)  SpO2: 96% (18 Aug 2021 08:32) (93% - 98%)    Focus PE:  Constitutional: pleasant elderly female resting in in bed resting comfortably w/o signs of distress  Neuro- alert and oriented x 3, speaking w/ clear articulate speech, CNII-VII grossly intact, all extremities w/ equal strength    Head: NCAT  Eyes- EOMI, PERRLA, clear conjunctiva and sclera, MMM  ENT- nml pharynx w/o erythema or exudates  Neck- supple, NT, no cervical LAD  CV-RRR, nml S1S2, no mmr, rubs or gallops  Resp- BL-CTA w/o increased WOB  GI- Abd sft, nt, nd, nr or guarding, no pulsatile mass+ BS  Extremities- all extremities w/ FROM, + distal pulses  Derm- left anterior shin w/ .5 cm healing ulcer, granulating w/o signs of spreading cellulitis, hyperpigmented skin                    12.9   4.35  )-----------( 154      ( 18 Aug 2021 05:47 )             40.5     08-18    143  |  110<H>  |  18  ----------------------------<  98  4.4   |  29  |  0.81    Ca    8.7      18 Aug 2021 05:47  Phos  3.1     08-18  Mg     2.3     08-18    TPro  7.0  /  Alb  2.7<L>  /  TBili  0.3  /  DBili  x   /  AST  22  /  ALT  34  /  AlkPhos  56  08-18    Urinalysis Basic - ( 17 Aug 2021 16:13 )    Color: Yellow / Appearance: Clear / S.010 / pH: x  Gluc: x / Ketone: Negative  / Bili: Negative / Urobili: Negative   Blood: x / Protein: Negative / Nitrite: Negative   Leuk Esterase: Trace / RBC: 0-2 /HPF / WBC 6-10 /HPF   Sq Epi: x / Non Sq Epi: Occasional /HPF / Bacteria: Trace /HPF    A/P:  Pt is a 87 y/o pleasant elderly female w/ PMHX of HTN , DM (not on any meds recently), DVT, b/l knee replacement, HLD, OA of knees and hands came in with chief complain of generalized weakness and headache for 1 week admitted further workup and mild case of UTI.    Plan:  1. Continue Ceftriaxone,           2. F/u urine culture           3. F/u dopplers of lower extremities and shoulder Xray           4. PT eval           5. Daughter Daisy called at  to provide update regarding pt's current condition and no answer.           6. Case discussed w/ infection and will continue contact Isolation for now. Event:  "No acute events overnight"      Brief HPI: As per chart review and per pt, pt 85 yo F with PMHx of HTN , DM (not on any meds recently), DVT, b/l knee replacement , HLD, OA of knees and hands came in with chief complain of generalized weakness and headache for 1 week. Daughter was present at bedside during the interview. Pt endorsed to have getting weaker for the past 1 week with frontal headaches intermittently. She is unable to enjoy the things she used to enjoy before. She cam walk 2-3 blocks with walker without any difficulty. She denies any chest pain, SOB, change in appetite emotional trauma, fall at home, constipation, change in life style, back pain, dysuria, cough, fevers, chills. As per daughter weather is so hot but pt is not drinking enough water now a days. Pt has chronic pain on left shoulder.  Pt had DVT on left leg after knee replacement. On Coumadin. Checked INR every 3 weeks. Last appointment with PCP was in Feb.      On initial In ED work up, EKG showed bradycardia with LVH. CTH neg and Chest X ray neg. Hydralazine PO 10mg 1 dose.  Noted bed bug on pt's clothing per triage note and at pt's house per EMS.     Objective: Vital signs last  24hrs  Vital Signs Last 24 Hrs  T(C): 36.4 (18 Aug 2021 08:32), Max: 36.4 (17 Aug 2021 20:30)  T(F): 97.6 (18 Aug 2021 08:32), Max: 97.6 (18 Aug 2021 08:32)  HR: 65 (18 Aug 2021 09:26) (56 - 70)  BP: 179/78 (18 Aug 2021 09:26) (143/75 - 182/84)  BP(mean): --  RR: 18 (18 Aug 2021 08:32) (17 - 18)  SpO2: 96% (18 Aug 2021 08:32) (93% - 98%)    Focus PE:  Constitutional: pleasant elderly female resting in in bed resting comfortably w/o signs of distress  Neuro- alert and oriented x 3, speaking w/ clear articulate speech, CNII-VII grossly intact, all extremities w/ equal strength    Head: NCAT  Eyes- EOMI, PERRLA, clear conjunctiva and sclera, MMM  ENT- nml pharynx w/o erythema or exudates  Neck- supple, NT, no cervical LAD  CV-RRR, nml S1S2, no mmr, rubs or gallops  Resp- BL-CTA w/o increased WOB  GI- Abd sft, nt, nd, nr or guarding, no pulsatile mass+ BS  Extremities- all extremities w/ FROM, + distal pulses  Derm- left anterior shin w/ .5 cm healing ulcer, granulating w/o signs of spreading cellulitis, hyperpigmented skin                    12.9   4.35  )-----------( 154      ( 18 Aug 2021 05:47 )             40.5     08-18    143  |  110<H>  |  18  ----------------------------<  98  4.4   |  29  |  0.81    Ca    8.7      18 Aug 2021 05:47  Phos  3.1     08-18  Mg     2.3     08-18    TPro  7.0  /  Alb  2.7<L>  /  TBili  0.3  /  DBili  x   /  AST  22  /  ALT  34  /  AlkPhos  56  08-18    Urinalysis Basic - ( 17 Aug 2021 16:13 )    Color: Yellow / Appearance: Clear / S.010 / pH: x  Gluc: x / Ketone: Negative  / Bili: Negative / Urobili: Negative   Blood: x / Protein: Negative / Nitrite: Negative   Leuk Esterase: Trace / RBC: 0-2 /HPF / WBC 6-10 /HPF   Sq Epi: x / Non Sq Epi: Occasional /HPF / Bacteria: Trace /HPF    A/P:  Pt is a 85 y/o pleasant elderly female w/ PMHX of HTN , DM (not on any meds recently), DVT, b/l knee replacement, HLD, OA of knees and hands came in with chief complain of generalized weakness and headache for 1 week admitted further workup and mild case of UTI.    Plan:  1. Continue Ceftriaxone,           2. F/u urine culture           3. F/u dopplers of lower extremities and shoulder Xray           4. PT eval           5. Daughter Daisy called at  to provide update regarding pt's current condition and no answer.           6. Case discussed w/ infection and will continue contact Isolation for now.     Update: 18:00- [facility] discussion initiated, GILBERT drafted and placed in chart.

## 2021-08-18 NOTE — DISCHARGE NOTE PROVIDER - NSDCCPCAREPLAN_GEN_ALL_CORE_FT
PRINCIPAL DISCHARGE DIAGNOSIS  Diagnosis: Weakness  Assessment and Plan of Treatment: Your weakness was likely due to a urinary tract infection.  Report any symptoms of pain or burning with urination, nausea or vomiting, back or flank pain, fever or chills, temperature of 101.0F or higher.      SECONDARY DISCHARGE DIAGNOSES  Diagnosis: DVT, lower extremity  Assessment and Plan of Treatment: Continue taking your blood thinner as prescribed and report any symptoms of bleeding, such as bloody stool or bloody urine, bleeding gum,    Diagnosis: Hypertension  Assessment and Plan of Treatment: Continue taking  your blood pressure medication as prescribed to help minimize your risk of increase end organ damage.  Report any symptoms of ongoing headache, nausea or vomiting, visual changes.

## 2021-08-19 DIAGNOSIS — Z02.9 ENCOUNTER FOR ADMINISTRATIVE EXAMINATIONS, UNSPECIFIED: ICD-10-CM

## 2021-08-19 LAB
ANION GAP SERPL CALC-SCNC: 5 MMOL/L — SIGNIFICANT CHANGE UP (ref 5–17)
BUN SERPL-MCNC: 18 MG/DL — SIGNIFICANT CHANGE UP (ref 7–18)
CALCIUM SERPL-MCNC: 9.1 MG/DL — SIGNIFICANT CHANGE UP (ref 8.4–10.5)
CHLORIDE SERPL-SCNC: 109 MMOL/L — HIGH (ref 96–108)
CO2 SERPL-SCNC: 28 MMOL/L — SIGNIFICANT CHANGE UP (ref 22–31)
CREAT SERPL-MCNC: 1.04 MG/DL — SIGNIFICANT CHANGE UP (ref 0.5–1.3)
GLUCOSE BLDC GLUCOMTR-MCNC: 108 MG/DL — HIGH (ref 70–99)
GLUCOSE BLDC GLUCOMTR-MCNC: 109 MG/DL — HIGH (ref 70–99)
GLUCOSE BLDC GLUCOMTR-MCNC: 115 MG/DL — HIGH (ref 70–99)
GLUCOSE BLDC GLUCOMTR-MCNC: 128 MG/DL — HIGH (ref 70–99)
GLUCOSE SERPL-MCNC: 137 MG/DL — HIGH (ref 70–99)
HCT VFR BLD CALC: 41.4 % — SIGNIFICANT CHANGE UP (ref 34.5–45)
HGB BLD-MCNC: 13.1 G/DL — SIGNIFICANT CHANGE UP (ref 11.5–15.5)
INR BLD: 2.32 RATIO — HIGH (ref 0.88–1.16)
MAGNESIUM SERPL-MCNC: 2 MG/DL — SIGNIFICANT CHANGE UP (ref 1.6–2.6)
MCHC RBC-ENTMCNC: 29.9 PG — SIGNIFICANT CHANGE UP (ref 27–34)
MCHC RBC-ENTMCNC: 31.6 GM/DL — LOW (ref 32–36)
MCV RBC AUTO: 94.5 FL — SIGNIFICANT CHANGE UP (ref 80–100)
NRBC # BLD: 0 /100 WBCS — SIGNIFICANT CHANGE UP (ref 0–0)
PHOSPHATE SERPL-MCNC: 2.6 MG/DL — SIGNIFICANT CHANGE UP (ref 2.5–4.5)
PLATELET # BLD AUTO: 156 K/UL — SIGNIFICANT CHANGE UP (ref 150–400)
POTASSIUM SERPL-MCNC: 4.2 MMOL/L — SIGNIFICANT CHANGE UP (ref 3.5–5.3)
POTASSIUM SERPL-SCNC: 4.2 MMOL/L — SIGNIFICANT CHANGE UP (ref 3.5–5.3)
PROTHROM AB SERPL-ACNC: 26.5 SEC — HIGH (ref 10.6–13.6)
RBC # BLD: 4.38 M/UL — SIGNIFICANT CHANGE UP (ref 3.8–5.2)
RBC # FLD: 13.6 % — SIGNIFICANT CHANGE UP (ref 10.3–14.5)
SODIUM SERPL-SCNC: 142 MMOL/L — SIGNIFICANT CHANGE UP (ref 135–145)
WBC # BLD: 7.13 K/UL — SIGNIFICANT CHANGE UP (ref 3.8–10.5)
WBC # FLD AUTO: 7.13 K/UL — SIGNIFICANT CHANGE UP (ref 3.8–10.5)

## 2021-08-19 PROCEDURE — 99233 SBSQ HOSP IP/OBS HIGH 50: CPT | Mod: GC

## 2021-08-19 PROCEDURE — 73030 X-RAY EXAM OF SHOULDER: CPT | Mod: 26,LT

## 2021-08-19 RX ORDER — NIFEDIPINE 30 MG
30 TABLET, EXTENDED RELEASE 24 HR ORAL DAILY
Refills: 0 | Status: DISCONTINUED | OUTPATIENT
Start: 2021-08-19 | End: 2021-08-21

## 2021-08-19 RX ORDER — HYDRALAZINE HCL 50 MG
25 TABLET ORAL EVERY 6 HOURS
Refills: 0 | Status: DISCONTINUED | OUTPATIENT
Start: 2021-08-19 | End: 2021-08-21

## 2021-08-19 RX ORDER — COLLAGENASE CLOSTRIDIUM HIST. 250 UNIT/G
1 OINTMENT (GRAM) TOPICAL DAILY
Refills: 0 | Status: DISCONTINUED | OUTPATIENT
Start: 2021-08-19 | End: 2021-08-21

## 2021-08-19 RX ORDER — HYDRALAZINE HCL 50 MG
25 TABLET ORAL EVERY 6 HOURS
Refills: 0 | Status: DISCONTINUED | OUTPATIENT
Start: 2021-08-19 | End: 2021-08-19

## 2021-08-19 RX ADMIN — GABAPENTIN 200 MILLIGRAM(S): 400 CAPSULE ORAL at 00:01

## 2021-08-19 RX ADMIN — Medication 25 MILLIGRAM(S): at 05:18

## 2021-08-19 RX ADMIN — Medication 25 MILLIGRAM(S): at 18:07

## 2021-08-19 RX ADMIN — Medication 10 MILLIGRAM(S): at 00:02

## 2021-08-19 RX ADMIN — CEFTRIAXONE 100 MILLIGRAM(S): 500 INJECTION, POWDER, FOR SOLUTION INTRAMUSCULAR; INTRAVENOUS at 20:53

## 2021-08-19 RX ADMIN — SIMVASTATIN 20 MILLIGRAM(S): 20 TABLET, FILM COATED ORAL at 00:02

## 2021-08-19 RX ADMIN — SIMVASTATIN 20 MILLIGRAM(S): 20 TABLET, FILM COATED ORAL at 22:31

## 2021-08-19 RX ADMIN — Medication 1 APPLICATION(S): at 05:19

## 2021-08-19 RX ADMIN — LOSARTAN POTASSIUM 100 MILLIGRAM(S): 100 TABLET, FILM COATED ORAL at 05:18

## 2021-08-19 RX ADMIN — WARFARIN SODIUM 4 MILLIGRAM(S): 2.5 TABLET ORAL at 05:19

## 2021-08-19 RX ADMIN — GABAPENTIN 200 MILLIGRAM(S): 400 CAPSULE ORAL at 13:07

## 2021-08-19 RX ADMIN — GABAPENTIN 200 MILLIGRAM(S): 400 CAPSULE ORAL at 22:31

## 2021-08-19 RX ADMIN — Medication 1 APPLICATION(S): at 13:07

## 2021-08-19 RX ADMIN — Medication 30 MILLIGRAM(S): at 18:07

## 2021-08-19 RX ADMIN — Medication 1 APPLICATION(S): at 22:31

## 2021-08-19 RX ADMIN — Medication 10 MILLIGRAM(S): at 05:18

## 2021-08-19 RX ADMIN — GABAPENTIN 200 MILLIGRAM(S): 400 CAPSULE ORAL at 05:18

## 2021-08-19 NOTE — PHYSICAL THERAPY INITIAL EVALUATION ADULT - ASSISTIVE DEVICE: SCOOT/BRIDGE, REHAB EVAL
Pt requests that we call a cab and pay for it because he doesn't have any money. Instructed patient that we are unable to do that at this time. Patient states he will call his daughter and wait in the lobby.      Liz Andrade RN  12/07/17 [id number]     bed rails

## 2021-08-19 NOTE — ADVANCED PRACTICE NURSE CONSULT - ASSESSMENT
This is a 86yr old female patient admitted for Weakness, presenting with a L. Lower Leg Venous Stasis Ulcer, to which there will be a Podiatry consultation for evaluation and treatment. There is currently no further need for wound care specialist consultation at this tie This is a 86yr old female patient admitted for Weakness, presenting with a L. Lower Leg Venous Stasis Ulcer, to which there will be a Podiatry consultation for evaluation and treatment. There is currently no further need for wound care specialist consultation at this time

## 2021-08-19 NOTE — PHYSICAL THERAPY INITIAL EVALUATION ADULT - IMPAIRED TRANSFERS: BED/CHAIR, REHAB EVAL
Patient is overdue for a Testosterone lab, one is active and pending.  Left message informing patient we received his request, he has labs pending he needs to complete prior and we will call with results. Any questions prior, notify the office. - no details left    decreased strength

## 2021-08-19 NOTE — CONSULT NOTE ADULT - SUBJECTIVE AND OBJECTIVE BOX
Podiatry HPI: 85y/o DM F seen sitting comfortably in her chair AAOx3. Patient states she has had this wound to her left leg since yesterday. Patient denies trauma to L leg. Patient denies drainage to L foot. Denies N/V/F/C/SOB. No other pedal complaints.         Patient is a 86y old  Female who presents with a chief complaint of Worsening generalized weakness (19 Aug 2021 14:05)      HPI:  87 yo F with PMHx of HTN , DM (not on any meds recently), DVT, b/l knee replacement , HLD, OA of knees and hands came in with chief complain of generalized weakness and headache for 1 week. Daughter was present at bedside during the interview. Pt endorsed to have getting weaker for the past 1 week with frontal headaches intermittently. She is unable to enjoy the things she used to enjoy before. She cam walk 2-3 blocks with walker without any difficulty. She denies any chest pain, SOB, change in appetite emotional trauma, fall at home, constipation, change in life style, back pain, dysuria, cough, fevers, chills. As per daughter weather is so hot but pt is not drinking enough water now a days. Pt has chronic pain on left shoulder.     Of note: Pt had DVT on left leg after knee replacement. On Coumadin. Checked INR every 3 weeks. Last appointment with PCP was in Feb.   In ED: Pt had an EKG which showed bradycardia with LVH. CTH neg and Chest X ray neg. Hydralazine PO 10mg 1 dose.  (17 Aug 2021 19:08)      PMH:Diabetes    Arthritis    Hypertension    DVT (deep venous thrombosis)      Allergies: No Known Allergies    Medications: acetaminophen   Tablet .. 650 milliGRAM(s) Oral every 6 hours PRN  cefTRIAXone   IVPB      cefTRIAXone   IVPB 1000 milliGRAM(s) IV Intermittent every 24 hours  erythromycin   Ointment 1 Application(s) Right EYE three times a day  gabapentin 200 milliGRAM(s) Oral every 8 hours  hydrALAZINE 25 milliGRAM(s) Oral every 6 hours PRN  losartan 100 milliGRAM(s) Oral daily  metoprolol tartrate 25 milliGRAM(s) Oral every 12 hours  NIFEdipine XL 30 milliGRAM(s) Oral daily  simvastatin 20 milliGRAM(s) Oral at bedtime  warfarin 4 milliGRAM(s) Oral <User Schedule>  warfarin 2 milliGRAM(s) Oral <User Schedule>    FH:No pertinent family history in first degree relatives    FH: diabetes mellitus (Sibling)      PSX: S/P knee replacement    S/P tubal ligation      SH: Social History:  Denies smoking, alcohol abuse and drug abuse (17 Aug 2021 19:08)      Vital Signs Last 24 Hrs  T(C): 37.1 (19 Aug 2021 13:18), Max: 37.1 (19 Aug 2021 13:18)  T(F): 98.8 (19 Aug 2021 13:18), Max: 98.8 (19 Aug 2021 13:18)  HR: 79 (19 Aug 2021 13:18) (68 - 79)  BP: 142/62 (19 Aug 2021 13:18) (141/75 - 189/79)  BP(mean): --  RR: 16 (19 Aug 2021 13:18) (15 - 18)  SpO2: 97% (19 Aug 2021 13:18) (95% - 98%)    LABS                        13.1   7.13  )-----------( 156      ( 19 Aug 2021 11:07 )             41.4               08-19    142  |  109<H>  |  18  ----------------------------<  137<H>  4.2   |  28  |  1.04    Ca    9.1      19 Aug 2021 11:07  Phos  2.6     08-19  Mg     2.0     08-19    TPro  7.0  /  Alb  2.7<L>  /  TBili  0.3  /  DBili  x   /  AST  22  /  ALT  34  /  AlkPhos  56  08-18     WBC Count: 7.13 K/uL (08-19-21 @ 11:07)  WBC Count: 4.35 K/uL (08-18-21 @ 05:47)  WBC Count: 4.47 K/uL (08-17-21 @ 16:13)      ROS  REVIEW OF SYSTEMS:    CONSTITUTIONAL: No fever, weight loss, or fatigue  EYES: No eye pain, visual disturbances, or discharge  ENMT:  No difficulty hearing, tinnitus, vertigo; No sinus or throat pain  NECK: No pain or stiffness  BREASTS: No pain, masses, or nipple discharge  RESPIRATORY: No cough, wheezing, chills or hemoptysis; No shortness of breath  CARDIOVASCULAR: No chest pain, palpitations, dizziness, or leg swelling  GASTROINTESTINAL: No abdominal or epigastric pain. No nausea, vomiting, or hematemesis; No diarrhea or constipation. No melena or hematochezia.  GENITOURINARY: No dysuria, frequency, hematuria, or incontinence  NEUROLOGICAL: No headaches, memory loss, loss of strength, numbness, or tremors  SKIN: No itching, burning, rashes, or lesions   LYMPH NODES: No enlarged glands  ENDOCRINE: No heat or cold intolerance; No hair loss  MUSCULOSKELETAL: No joint pain or swelling; No muscle, back, or extremity pain  PSYCHIATRIC: No depression, anxiety, mood swings, or difficulty sleeping  HEME/LYMPH: No easy bruising, or bleeding gums  ALLERY AND IMMUNOLOGIC: No hives or eczema      PHYSICAL EXAM  GEN: FRANCISCO J FREEMAN is a pleasant well-nourished, well developed 86y Female in no acute distress, alert awake, and oriented to person, place and time.   LE Focused:    Vasc:  Pulses faintly palpable b/l. CFT <5 seconds to distal nasra b/l. Pedal hair growth absent. No erythema or edema present to LE b/l.   Derm: Superficial wound noted to anterior middiaphyseal leg L. Measures 0.5 cm x 0.3 cm x 0.2 with a 50% granular/ 50% fibrotic wound base. No drainage noted. -PTB. No malodor. No tunneling or undermining. No clinical signs of infection.      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 Muscle strength 5/5 to all extrinsic muscle groups. No pain on palpation to calves b/l.     Imaging: none  Cultures: none

## 2021-08-19 NOTE — CONSULT NOTE ADULT - ASSESSMENT
A: Venous stasis ulcer     P:  Patient evaluated, chart reviewed  Wound was assessed   Wound dressed with alleyvn pad and ACE for compression LE   WCO in place:  Please change dressing M/W/Sun  Apply santyl, Alleyvn pad, and ACE to LE   Discussed with Attending Dr. Brown      A: Venous stasis ulcer     P:  Patient evaluated, chart reviewed  Wound was assessed   Wound dressed with alleyvn pad and ACE for compression LE   Rx santyl for LLE wound   WCO in place:  Please change dressing M/W/Sun  Apply santyl, Alleyvn pad, and ACE to LE   Recommend elevation of LE daily   Educated patient on proper foot care   Discussed with Attending Dr. Brown

## 2021-08-19 NOTE — PHYSICAL THERAPY INITIAL EVALUATION ADULT - GENERAL OBSERVATIONS, REHAB EVAL
Consult received,EMR, radiology and labs reviewed. Patient received supine in bed, NAD, on Contact precaution . Patient agreed to EVALUATION from Physical Therapist.

## 2021-08-20 LAB
ANION GAP SERPL CALC-SCNC: 7 MMOL/L — SIGNIFICANT CHANGE UP (ref 5–17)
APTT BLD: 40.6 SEC — HIGH (ref 27.5–35.5)
BUN SERPL-MCNC: 17 MG/DL — SIGNIFICANT CHANGE UP (ref 7–18)
CALCIUM SERPL-MCNC: 9.1 MG/DL — SIGNIFICANT CHANGE UP (ref 8.4–10.5)
CHLORIDE SERPL-SCNC: 106 MMOL/L — SIGNIFICANT CHANGE UP (ref 96–108)
CO2 SERPL-SCNC: 27 MMOL/L — SIGNIFICANT CHANGE UP (ref 22–31)
CREAT SERPL-MCNC: 0.91 MG/DL — SIGNIFICANT CHANGE UP (ref 0.5–1.3)
GLUCOSE BLDC GLUCOMTR-MCNC: 102 MG/DL — HIGH (ref 70–99)
GLUCOSE BLDC GLUCOMTR-MCNC: 106 MG/DL — HIGH (ref 70–99)
GLUCOSE BLDC GLUCOMTR-MCNC: 114 MG/DL — HIGH (ref 70–99)
GLUCOSE BLDC GLUCOMTR-MCNC: 138 MG/DL — HIGH (ref 70–99)
GLUCOSE SERPL-MCNC: 105 MG/DL — HIGH (ref 70–99)
HCT VFR BLD CALC: 40.3 % — SIGNIFICANT CHANGE UP (ref 34.5–45)
HGB BLD-MCNC: 13 G/DL — SIGNIFICANT CHANGE UP (ref 11.5–15.5)
INR BLD: 2.29 RATIO — HIGH (ref 0.88–1.16)
MAGNESIUM SERPL-MCNC: 2.1 MG/DL — SIGNIFICANT CHANGE UP (ref 1.6–2.6)
MCHC RBC-ENTMCNC: 30.2 PG — SIGNIFICANT CHANGE UP (ref 27–34)
MCHC RBC-ENTMCNC: 32.3 GM/DL — SIGNIFICANT CHANGE UP (ref 32–36)
MCV RBC AUTO: 93.7 FL — SIGNIFICANT CHANGE UP (ref 80–100)
NRBC # BLD: 0 /100 WBCS — SIGNIFICANT CHANGE UP (ref 0–0)
PHOSPHATE SERPL-MCNC: 2.4 MG/DL — LOW (ref 2.5–4.5)
PLATELET # BLD AUTO: 153 K/UL — SIGNIFICANT CHANGE UP (ref 150–400)
POTASSIUM SERPL-MCNC: 4.1 MMOL/L — SIGNIFICANT CHANGE UP (ref 3.5–5.3)
POTASSIUM SERPL-SCNC: 4.1 MMOL/L — SIGNIFICANT CHANGE UP (ref 3.5–5.3)
PROTHROM AB SERPL-ACNC: 26.2 SEC — HIGH (ref 10.6–13.6)
RBC # BLD: 4.3 M/UL — SIGNIFICANT CHANGE UP (ref 3.8–5.2)
RBC # FLD: 13.6 % — SIGNIFICANT CHANGE UP (ref 10.3–14.5)
SODIUM SERPL-SCNC: 140 MMOL/L — SIGNIFICANT CHANGE UP (ref 135–145)
WBC # BLD: 8.63 K/UL — SIGNIFICANT CHANGE UP (ref 3.8–10.5)
WBC # FLD AUTO: 8.63 K/UL — SIGNIFICANT CHANGE UP (ref 3.8–10.5)

## 2021-08-20 PROCEDURE — 99233 SBSQ HOSP IP/OBS HIGH 50: CPT | Mod: GC

## 2021-08-20 RX ORDER — SODIUM,POTASSIUM PHOSPHATES 278-250MG
1 POWDER IN PACKET (EA) ORAL ONCE
Refills: 0 | Status: COMPLETED | OUTPATIENT
Start: 2021-08-20 | End: 2021-08-20

## 2021-08-20 RX ORDER — SENNA PLUS 8.6 MG/1
2 TABLET ORAL AT BEDTIME
Refills: 0 | Status: DISCONTINUED | OUTPATIENT
Start: 2021-08-20 | End: 2021-08-21

## 2021-08-20 RX ORDER — COLLAGENASE CLOSTRIDIUM HIST. 250 UNIT/G
1 OINTMENT (GRAM) TOPICAL DAILY
Refills: 0 | Status: DISCONTINUED | OUTPATIENT
Start: 2021-08-20 | End: 2021-08-20

## 2021-08-20 RX ORDER — POLYETHYLENE GLYCOL 3350 17 G/17G
17 POWDER, FOR SOLUTION ORAL DAILY
Refills: 0 | Status: DISCONTINUED | OUTPATIENT
Start: 2021-08-20 | End: 2021-08-21

## 2021-08-20 RX ADMIN — GABAPENTIN 200 MILLIGRAM(S): 400 CAPSULE ORAL at 23:02

## 2021-08-20 RX ADMIN — SENNA PLUS 2 TABLET(S): 8.6 TABLET ORAL at 23:01

## 2021-08-20 RX ADMIN — Medication 30 MILLIGRAM(S): at 05:46

## 2021-08-20 RX ADMIN — SIMVASTATIN 20 MILLIGRAM(S): 20 TABLET, FILM COATED ORAL at 23:03

## 2021-08-20 RX ADMIN — GABAPENTIN 200 MILLIGRAM(S): 400 CAPSULE ORAL at 13:50

## 2021-08-20 RX ADMIN — Medication 1 APPLICATION(S): at 13:49

## 2021-08-20 RX ADMIN — CEFTRIAXONE 100 MILLIGRAM(S): 500 INJECTION, POWDER, FOR SOLUTION INTRAMUSCULAR; INTRAVENOUS at 21:25

## 2021-08-20 RX ADMIN — LOSARTAN POTASSIUM 100 MILLIGRAM(S): 100 TABLET, FILM COATED ORAL at 05:46

## 2021-08-20 RX ADMIN — GABAPENTIN 200 MILLIGRAM(S): 400 CAPSULE ORAL at 05:46

## 2021-08-20 RX ADMIN — Medication 1 APPLICATION(S): at 05:46

## 2021-08-20 RX ADMIN — WARFARIN SODIUM 4 MILLIGRAM(S): 2.5 TABLET ORAL at 05:46

## 2021-08-20 RX ADMIN — Medication 1 PACKET(S): at 11:05

## 2021-08-20 RX ADMIN — Medication 1 APPLICATION(S): at 23:04

## 2021-08-20 RX ADMIN — Medication 25 MILLIGRAM(S): at 05:46

## 2021-08-20 NOTE — PROGRESS NOTE ADULT - ASSESSMENT
A: Venous stasis ulcer     P:  Patient evaluated, chart reviewed  Wound was assessed   Wound dressed with alleyvn pad and ACE for compression LE   Rx santyl for LLE wound   WCO in place:  Please change dressing M/W/Sun  Apply santyl, Alleyvn pad, and ACE to LE   Recommend elevation of LE daily   Educated patient on proper foot care   Discussed with Attending Dr. Brown and seen bedside with Dr. Jaimes      A: Venous stasis ulcer     P:  Patient evaluated, chart reviewed  Wound was assessed   Wound dressed with alleyvn pad and ACE for compression LE   Rx santyl for LLE wound   WCO in place:  Please change dressing M/W/Sun  Apply santyl, Alleyvn pad, and ACE to LE   Recommend elevation of LE daily   Educated patient on proper foot care   Patient stable from podiatry   Discussed with Attending Dr. Brown and seen bedside with Dr. Jaimes

## 2021-08-20 NOTE — PROGRESS NOTE ADULT - ATTENDING COMMENTS
Patient hx of DVT, uncontrolled HTN, DM, hx of thyroid nodule and renal cyst who presented with generalized deconditioning and UTI. Patient's headache has resolved. Blood pressure improved after starting nifedipine, currently well controlled. Evaluated by PT, plan for LEONA Patient hx of DVT, uncontrolled HTN, DM, hx of thyroid nodule and renal cyst who presented with generalized deconditioning and UTI. Patient's headache has resolved. Blood pressure improved after starting nifedipine, currently well controlled. Will treat UTI with ceftriaxone. Evaluated by PT, plan for LEONA

## 2021-08-20 NOTE — PROGRESS NOTE ADULT - PROBLEM SELECTOR PLAN 7
Stable  Pt not on medication at home  Continue glucose monitoring
Cont Losartan and Metoprolol  Added Hydralazine low dose TID
Stable  Pt not on medication at home  Continue glucose monitoring

## 2021-08-20 NOTE — PROGRESS NOTE ADULT - REASON FOR ADMISSION
Worsening generalized weakness

## 2021-08-20 NOTE — PROGRESS NOTE ADULT - PROBLEM SELECTOR PLAN 1
Controlled  Pt takes Losartan and Metoprolol at home  Continue home regimen with parameters  Continue Nifedipine with parameters  Transition Hydralazine to PRN   Monitor BP
Uncontrolled  Pt takes Losartan and Metoprolol at home  Continue home regimen with parameters  Start Nifedipine with parameters, titrate up as needed  Transition Hydralazine to PRN   Monitor BP
Will cont Ceftriaxone  F/u urine cx

## 2021-08-20 NOTE — PROGRESS NOTE ADULT - PROBLEM SELECTOR PLAN 11
Pt will be medically stable for discharge when BP is controlled  PT recommends LEONA  Care management following for discharge planning
Pt will be medically stable for discharge  PT recommends LEONA  Scheduled for discharge 8/21 AM  Care management following for discharge planning

## 2021-08-20 NOTE — PROGRESS NOTE ADULT - PROBLEM SELECTOR PLAN 3
Will need PT eval
INR 2.32  Pt takes coumadin at home  Continue home regimen  Follow up INR in AM
INR 2.29  Pt takes coumadin at home  Continue home regimen  Follow up INR in AM

## 2021-08-20 NOTE — PROGRESS NOTE ADULT - PROBLEM SELECTOR PLAN 8
Pt takes statin at home  Continue home regimen
Pt takes statin at home  Continue home regimen
Cont Warfarin home dose

## 2021-08-20 NOTE — PROGRESS NOTE ADULT - PROBLEM SELECTOR PLAN 5
PT recommends LEONA  CM following
PT recommends LEONA  CM following
Stable   LE doppler neg for acute DVT
167.64

## 2021-08-20 NOTE — PROGRESS NOTE ADULT - PROBLEM SELECTOR PROBLEM 4
Chronic left shoulder pain
Tension-type headache, unspecified, not intractable
Tension-type headache, unspecified, not intractable

## 2021-08-20 NOTE — PROGRESS NOTE ADULT - PROBLEM SELECTOR PLAN 10
Continue isolation
On Warfarin     Other:  Patient is on isolation for possible bed bug infestation in house
Continue isolation

## 2021-08-20 NOTE — PROGRESS NOTE ADULT - PROBLEM SELECTOR PLAN 6
LE doppler neg for acute DVT  Wound APRN recommends Podiatry follow up  Podiatry consulted
LE doppler neg for acute DVT  Wound APRN recommends appreciated  Podiatry recommends dress with alleyvn pad and ACE for compression LE; Rx santyl for LLE wound   Podiatry following
Well controlled with diet and Exercise

## 2021-08-20 NOTE — PROGRESS NOTE ADULT - SUBJECTIVE AND OBJECTIVE BOX
Podiatry HPI: 87y/o DM F seen bedside. Patient denies any pain to L leg wound. Denies drainage to left leg wound. Patient denies any acute overnight events. Denies any constitutional symptoms. No other pedal complaints.       Patient is a 86y old  Female who presents with a chief complaint of Worsening generalized weakness (19 Aug 2021 14:05)      HPI:  87 yo F with PMHx of HTN , DM (not on any meds recently), DVT, b/l knee replacement , HLD, OA of knees and hands came in with chief complain of generalized weakness and headache for 1 week. Daughter was present at bedside during the interview. Pt endorsed to have getting weaker for the past 1 week with frontal headaches intermittently. She is unable to enjoy the things she used to enjoy before. She cam walk 2-3 blocks with walker without any difficulty. She denies any chest pain, SOB, change in appetite emotional trauma, fall at home, constipation, change in life style, back Medications acetaminophen   Tablet. 650 milliGRAM(s) Oral every 6 hours PRN        Medications acetaminophen   Tablet .. 650 milliGRAM(s) Oral every 6 hours PRN  cefTRIAXone   IVPB      cefTRIAXone   IVPB 1000 milliGRAM(s) IV Intermittent every 24 hours  collagenase Ointment 1 Application(s) Topical daily  erythromycin   Ointment 1 Application(s) Right EYE three times a day  gabapentin 200 milliGRAM(s) Oral every 8 hours  hydrALAZINE 25 milliGRAM(s) Oral every 6 hours PRN  losartan 100 milliGRAM(s) Oral daily  metoprolol tartrate 25 milliGRAM(s) Oral every 12 hours  NIFEdipine XL 30 milliGRAM(s) Oral daily  polyethylene glycol 3350 17 Gram(s) Oral daily PRN  potassium phosphate / sodium phosphate Powder (PHOS-NaK) 1 Packet(s) Oral once  senna 2 Tablet(s) Oral at bedtime  simvastatin 20 milliGRAM(s) Oral at bedtime  warfarin 4 milliGRAM(s) Oral <User Schedule>  warfarin 2 milliGRAM(s) Oral <User Schedule>    FHNo pertinent family history in first degree relatives    FH: diabetes mellitus (Sibling)    ,   PMHDiabetes    Arthritis    Hypertension    DVT (deep venous thrombosis)       PSHS/P knee replacement    S/P tubal ligation        Labs                          13.0   8.63  )-----------( 153      ( 20 Aug 2021 06:52 )             40.3      08-20    140  |  106  |  17  ----------------------------<  105<H>  4.1   |  27  |  0.91    Ca    9.1      20 Aug 2021 06:52  Phos  2.4     08-20  Mg     2.1     08-20       Vital Signs Last 24 Hrs  T(C): 36.7 (20 Aug 2021 04:46), Max: 37.1 (19 Aug 2021 13:18)  T(F): 98 (20 Aug 2021 04:46), Max: 98.8 (19 Aug 2021 13:18)  HR: 70 (20 Aug 2021 04:46) (66 - 79)  BP: 136/70 (20 Aug 2021 04:46) (136/70 - 145/60)  BP(mean): --  RR: 17 (20 Aug 2021 04:46) (16 - 17)  SpO2: 95% (20 Aug 2021 04:46) (95% - 100%)          WBC Count: 8.63 K/uL (08-20-21 @ 06:52)  WBC Count: 7.13 K/uL (08-19-21 @ 11:07)      ROS  REVIEW OF SYSTEMS:    CONSTITUTIONAL: No fever, weight loss, or fatigue  EYES: No eye pain, visual disturbances, or discharge  ENMT:  No difficulty hearing, tinnitus, vertigo; No sinus or throat pain  NECK: No pain or stiffness  BREASTS: No pain, masses, or nipple discharge  RESPIRATORY: No cough, wheezing, chills or hemoptysis; No shortness of breath  CARDIOVASCULAR: No chest pain, palpitations, dizziness, or leg swelling  GASTROINTESTINAL: No abdominal or epigastric pain. No nausea, vomiting, or hematemesis; No diarrhea or constipation. No melena or hematochezia.  GENITOURINARY: No dysuria, frequency, hematuria, or incontinence  NEUROLOGICAL: No headaches, memory loss, loss of strength, numbness, or tremors  SKIN: No itching, burning, rashes, or lesions   LYMPH NODES: No enlarged glands  ENDOCRINE: No heat or cold intolerance; No hair loss  MUSCULOSKELETAL: No joint pain or swelling; No muscle, back, or extremity pain  PSYCHIATRIC: No depression, anxiety, mood swings, or difficulty sleeping  HEME/LYMPH: No easy bruising, or bleeding gums  ALLERY AND IMMUNOLOGIC: No hives or eczema      PHYSICAL EXAM  GEN: FRANCISCO J FREEMAN is a pleasant well-nourished, well developed 86y Female in no acute distress, alert awake, and oriented to person, place and time.   LE Focused:    Vasc:  Pulses faintly palpable b/l. CFT <5 seconds to distal nasra b/l. Pedal hair growth absent. No erythema or edema present to LE b/l.   Derm: Superficial wound noted to anterior middiaphyseal leg L. Measures 0.5 cm x 0.3 cm x 0.2 with a 50% granular/ 50% fibrotic wound base. No drainage noted. -PTB. No malodor. No tunneling or undermining. No clinical signs of infection.      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 Muscle strength 5/5 to all extrinsic muscle groups. No pain on palpation to calves b/l.     Imaging: none  Cultures: none       
Patient is a 86y old  Female who presents with a chief complaint of Worsening generalized weakness (18 Aug 2021 16:14)    Patient was seen and examined at bedside   Reports headache has resolved     INTERVAL HPI/OVERNIGHT EVENTS:  T(C): 36.8 (21 @ 17:26), Max: 36.8 (21 @ 17:26)  HR: 60 (21 @ 17:26) (56 - 70)  BP: 161/63 (21 @ 17:26) (143/75 - 179/78)  RR: 18 (21 @ 17:26) (17 - 18)  SpO2: 97% (21 @ 17:26) (93% - 98%)  Wt(kg): --  I&O's Summary      REVIEW OF SYSTEMS: denies fever, chills, SOB, palpitations, chest pain, abdominal pain, nausea, vomiting, diarrhea, constipation, dizziness    MEDICATIONS  (STANDING):  cefTRIAXone   IVPB      cefTRIAXone   IVPB 1000 milliGRAM(s) IV Intermittent every 24 hours  erythromycin   Ointment 1 Application(s) Right EYE three times a day  gabapentin 200 milliGRAM(s) Oral every 8 hours  losartan 100 milliGRAM(s) Oral daily  metoprolol tartrate 25 milliGRAM(s) Oral every 12 hours  simvastatin 20 milliGRAM(s) Oral at bedtime  warfarin 4 milliGRAM(s) Oral <User Schedule>  warfarin 2 milliGRAM(s) Oral <User Schedule>    MEDICATIONS  (PRN):  acetaminophen   Tablet .. 650 milliGRAM(s) Oral every 6 hours PRN Mild Pain (1 - 3), Moderate Pain (4 - 6)      PHYSICAL EXAM:  NAD, pleasant elderly lady  Head: Atraumatic, normocephalic   EOMI  AAOx3, strength in all 4 extremities 4/5, left sided facial drooping- not new   Lungs CTABL  S1S2 WNL, no MRG  Abd soft, nontender, BS present   BL LE no edema, left calf mild tenderness, Left shoulder restricted movement due to pain   Skin: left knee chronic ulcer    LABS:                        12.9   4.35  )-----------( 154      ( 18 Aug 2021 05:47 )             40.5     -    143  |  110<H>  |  18  ----------------------------<  98  4.4   |  29  |  0.81    Ca    8.7      18 Aug 2021 05:47  Phos  3.1       Mg     2.3         TPro  7.0  /  Alb  2.7<L>  /  TBili  0.3  /  DBili  x   /  AST  22  /  ALT  34  /  AlkPhos  56  08-18    PT/INR - ( 17 Aug 2021 16:13 )   PT: 24.9 sec;   INR: 2.17 ratio           Urinalysis Basic - ( 17 Aug 2021 16:13 )    Color: Yellow / Appearance: Clear / S.010 / pH: x  Gluc: x / Ketone: Negative  / Bili: Negative / Urobili: Negative   Blood: x / Protein: Negative / Nitrite: Negative   Leuk Esterase: Trace / RBC: 0-2 /HPF / WBC 6-10 /HPF   Sq Epi: x / Non Sq Epi: Occasional /HPF / Bacteria: Trace /HPF      CAPILLARY BLOOD GLUCOSE            Urinalysis Basic - ( 17 Aug 2021 16:13 )    Color: Yellow / Appearance: Clear / S.010 / pH: x  Gluc: x / Ketone: Negative  / Bili: Negative / Urobili: Negative   Blood: x / Protein: Negative / Nitrite: Negative   Leuk Esterase: Trace / RBC: 0-2 /HPF / WBC 6-10 /HPF   Sq Epi: x / Non Sq Epi: Occasional /HPF / Bacteria: Trace /HPF    
HPI:  86 YOF admitted for headaches.  FTH elevated BP, antihypertensives started.  Pt awaiting d/c to [facility] on 8/21.    OVERNIGHT EVENTS:  No new overnight events.  Seen and examined at bedside.     REVIEW OF SYSTEMS:      CONSTITUTIONAL: No fever, headaches resolved  EYES: no acute visual disturbances  NECK: No pain or stiffness  RESPIRATORY: No cough; No shortness of breath  CARDIOVASCULAR: No chest pain, no palpitations  GASTROINTESTINAL: No pain. No nausea, vomiting or diarrhea   NEUROLOGICAL: No headache or numbness, no tremors  MUSCULOSKELETAL: No joint pain, no muscle pain  GENITOURINARY: no dysuria, no frequency, no hesitancy  PSYCHIATRY: no depression, no anxiety  ALL OTHER  ROS negative        Vital Signs Last 24 Hrs  T(C): 37.4 (20 Aug 2021 13:19), Max: 37.4 (20 Aug 2021 13:19)  T(F): 99.4 (20 Aug 2021 13:19), Max: 99.4 (20 Aug 2021 13:19)  HR: 76 (20 Aug 2021 13:19) (66 - 76)  BP: 124/66 (20 Aug 2021 13:19) (124/66 - 145/60)  BP(mean): --  RR: 16 (20 Aug 2021 13:19) (16 - 17)  SpO2: 95% (20 Aug 2021 13:19) (95% - 100%)    ________________________________________________  PHYSICAL EXAM:    GENERAL: NAD  HEENT: Normocephalic; conjunctivae and sclerae clear;  NECK : supple, no JVD  CHEST/LUNG: Clear to auscultation; Nonlabored  HEART: S1 S2  regular  ABDOMEN: Soft, Nontender, Nondistended; Bowel sounds present  EXTREMITIES: no cyanosis; no LE edema; no calf tenderness  SKIN: warm and dry  NERVOUS SYSTEM:  Alert; no new deficits    _________________________________________________  CURRENT MEDICATIONS:    MEDICATIONS  (STANDING):  cefTRIAXone   IVPB      cefTRIAXone   IVPB 1000 milliGRAM(s) IV Intermittent every 24 hours  collagenase Ointment 1 Application(s) Topical daily  erythromycin   Ointment 1 Application(s) Right EYE three times a day  gabapentin 200 milliGRAM(s) Oral every 8 hours  losartan 100 milliGRAM(s) Oral daily  metoprolol tartrate 25 milliGRAM(s) Oral every 12 hours  NIFEdipine XL 30 milliGRAM(s) Oral daily  senna 2 Tablet(s) Oral at bedtime  simvastatin 20 milliGRAM(s) Oral at bedtime  warfarin 4 milliGRAM(s) Oral <User Schedule>  warfarin 2 milliGRAM(s) Oral <User Schedule>    MEDICATIONS  (PRN):  acetaminophen   Tablet .. 650 milliGRAM(s) Oral every 6 hours PRN Mild Pain (1 - 3), Moderate Pain (4 - 6)  hydrALAZINE 25 milliGRAM(s) Oral every 6 hours PRN Systolic blood pressure > 180  polyethylene glycol 3350 17 Gram(s) Oral daily PRN Constipation      __________________________________________________  LABS:                          13.0   8.63  )-----------( 153      ( 20 Aug 2021 06:52 )             40.3     08-20    140  |  106  |  17  ----------------------------<  105<H>  4.1   |  27  |  0.91    Ca    9.1      20 Aug 2021 06:52  Phos  2.4     08-20  Mg     2.1     08-20      PT/INR - ( 20 Aug 2021 06:52 )   PT: 26.2 sec;   INR: 2.29 ratio         PTT - ( 20 Aug 2021 06:52 )  PTT:40.6 sec    CAPILLARY BLOOD GLUCOSE      POCT Blood Glucose.: 106 mg/dL (20 Aug 2021 11:56)  POCT Blood Glucose.: 102 mg/dL (20 Aug 2021 08:18)  POCT Blood Glucose.: 108 mg/dL (19 Aug 2021 21:09)  POCT Blood Glucose.: 109 mg/dL (19 Aug 2021 17:12)      __________________________________________________  RADIOLOGY & ADDITIONAL TESTS:    Imaging Personally Reviewed:  YES    < from: Xray Chest 1 View- PORTABLE-Urgent (08.17.21 @ 16:39) >  The lung fields and pleural surfaces are unremarkable.    Bilateral shoulder degeneration and are upper left humeral shaft deformity again noted.    Chest is similar to August 5, 2015.    IMPRESSION: No acute finding or change.    < end of copied text >    Consultant(s) Notes Reviewed:   YES     Plan of care was discussed with patient and /or primary care giver; all questions and concerns were addressed and care was aligned with patient's wishes.    Plan discussed with attending and consulting physicians.  
HPI:  86 YOF admitted for headaches.  FTH elevated BP, antihypertensives started.    OVERNIGHT EVENTS:  No new overnight events.  Seen and examined at bedside.     REVIEW OF SYSTEMS:      CONSTITUTIONAL: No fever  EYES: no acute visual disturbances  NECK: No pain or stiffness  RESPIRATORY: No cough; No shortness of breath  CARDIOVASCULAR: No chest pain, no palpitations  GASTROINTESTINAL: No pain. No nausea, vomiting or diarrhea   NEUROLOGICAL: headache has subsided; no numbness, no tremors  MUSCULOSKELETAL: No joint pain, no muscle pain  GENITOURINARY: no dysuria, no frequency, no hesitancy  PSYCHIATRY: no depression, no anxiety  ALL OTHER  ROS negative        Vital Signs Last 24 Hrs  T(C): 37.1 (19 Aug 2021 13:18), Max: 37.1 (19 Aug 2021 13:18)  T(F): 98.8 (19 Aug 2021 13:18), Max: 98.8 (19 Aug 2021 13:18)  HR: 79 (19 Aug 2021 13:18) (60 - 79)  BP: 142/62 (19 Aug 2021 13:18) (141/75 - 189/79)  BP(mean): --  RR: 16 (19 Aug 2021 13:18) (15 - 18)  SpO2: 97% (19 Aug 2021 13:18) (95% - 98%)    ________________________________________________  PHYSICAL EXAM:    GENERAL: NAD  HEENT: Normocephalic; conjunctivae and sclerae clear;  NECK : supple, no JVD  CHEST/LUNG: Clear to auscultation; Nonlabored  HEART: S1 S2  regular  ABDOMEN: Soft, Nontender, Nondistended; Bowel sounds present  EXTREMITIES: no cyanosis; no LE edema; no calf tenderness  SKIN: warm and dry  NERVOUS SYSTEM:  Alert; no new deficits    _________________________________________________  CURRENT MEDICATIONS:    MEDICATIONS  (STANDING):  cefTRIAXone   IVPB      cefTRIAXone   IVPB 1000 milliGRAM(s) IV Intermittent every 24 hours  erythromycin   Ointment 1 Application(s) Right EYE three times a day  gabapentin 200 milliGRAM(s) Oral every 8 hours  losartan 100 milliGRAM(s) Oral daily  metoprolol tartrate 25 milliGRAM(s) Oral every 12 hours  NIFEdipine XL 30 milliGRAM(s) Oral daily  simvastatin 20 milliGRAM(s) Oral at bedtime  warfarin 4 milliGRAM(s) Oral <User Schedule>  warfarin 2 milliGRAM(s) Oral <User Schedule>    MEDICATIONS  (PRN):  acetaminophen   Tablet .. 650 milliGRAM(s) Oral every 6 hours PRN Mild Pain (1 - 3), Moderate Pain (4 - 6)  hydrALAZINE 25 milliGRAM(s) Oral every 6 hours PRN Systolic blood pressure > 180      __________________________________________________  LABS:                          13.1   7.13  )-----------( 156      ( 19 Aug 2021 11:07 )             41.4     08-    142  |  109<H>  |  18  ----------------------------<  137<H>  4.2   |  28  |  1.04    Ca    9.1      19 Aug 2021 11:07  Phos  2.6     -  Mg     2.0     -    TPro  7.0  /  Alb  2.7<L>  /  TBili  0.3  /  DBili  x   /  AST  22  /  ALT  34  /  AlkPhos  56  08-18    PT/INR - ( 19 Aug 2021 11:07 )   PT: 26.5 sec;   INR: 2.32 ratio           Urinalysis Basic - ( 17 Aug 2021 16:13 )    Color: Yellow / Appearance: Clear / S.010 / pH: x  Gluc: x / Ketone: Negative  / Bili: Negative / Urobili: Negative   Blood: x / Protein: Negative / Nitrite: Negative   Leuk Esterase: Trace / RBC: 0-2 /HPF / WBC 6-10 /HPF   Sq Epi: x / Non Sq Epi: Occasional /HPF / Bacteria: Trace /HPF      CAPILLARY BLOOD GLUCOSE      POCT Blood Glucose.: 128 mg/dL (19 Aug 2021 11:51)  POCT Blood Glucose.: 115 mg/dL (19 Aug 2021 07:36)      __________________________________________________  RADIOLOGY & ADDITIONAL TESTS:    Imaging Personally Reviewed:  YES    < from: Xray Chest 1 View- PORTABLE-Urgent (21 @ 16:39) >  INTERPRETATION:  AP semierect chest on 2021 at 4:26 PM. Patient has chest pain.    Heart is magnified by technique.    The lung fields and pleural surfaces are unremarkable.    Bilateral shoulder degeneration and are upper left humeral shaft deformity again noted.    Chest is similar to 2015.    IMPRESSION: No acute finding or change.    < end of copied text >    Consultant(s) Notes Reviewed:   YES     Plan of care was discussed with patient and /or primary care giver; all questions and concerns were addressed and care was aligned with patient's wishes.    Plan discussed with attending and consulting physicians.

## 2021-08-20 NOTE — PROGRESS NOTE ADULT - PROBLEM SELECTOR PLAN 2
Headache improved
UA+  Urine culture negative   Continue ceftriaxone (day 3/3)
UA+  Urine culture negative   Continue ceftriaxone (day 2/3)

## 2021-08-21 ENCOUNTER — TRANSCRIPTION ENCOUNTER (OUTPATIENT)
Age: 86
End: 2021-08-21

## 2021-08-21 VITALS
SYSTOLIC BLOOD PRESSURE: 149 MMHG | HEART RATE: 93 BPM | RESPIRATION RATE: 17 BRPM | DIASTOLIC BLOOD PRESSURE: 67 MMHG | OXYGEN SATURATION: 95 % | TEMPERATURE: 100 F

## 2021-08-21 LAB
GLUCOSE BLDC GLUCOMTR-MCNC: 123 MG/DL — HIGH (ref 70–99)
GLUCOSE BLDC GLUCOMTR-MCNC: 155 MG/DL — HIGH (ref 70–99)
SARS-COV-2 RNA SPEC QL NAA+PROBE: SIGNIFICANT CHANGE UP

## 2021-08-21 PROCEDURE — 36415 COLL VENOUS BLD VENIPUNCTURE: CPT

## 2021-08-21 PROCEDURE — 80048 BASIC METABOLIC PNL TOTAL CA: CPT

## 2021-08-21 PROCEDURE — 84100 ASSAY OF PHOSPHORUS: CPT

## 2021-08-21 PROCEDURE — 80053 COMPREHEN METABOLIC PANEL: CPT

## 2021-08-21 PROCEDURE — 99239 HOSP IP/OBS DSCHRG MGMT >30: CPT | Mod: GC

## 2021-08-21 PROCEDURE — 84484 ASSAY OF TROPONIN QUANT: CPT

## 2021-08-21 PROCEDURE — 99285 EMERGENCY DEPT VISIT HI MDM: CPT | Mod: 25

## 2021-08-21 PROCEDURE — 82306 VITAMIN D 25 HYDROXY: CPT

## 2021-08-21 PROCEDURE — 87086 URINE CULTURE/COLONY COUNT: CPT

## 2021-08-21 PROCEDURE — 87635 SARS-COV-2 COVID-19 AMP PRB: CPT

## 2021-08-21 PROCEDURE — 82962 GLUCOSE BLOOD TEST: CPT

## 2021-08-21 PROCEDURE — 86769 SARS-COV-2 COVID-19 ANTIBODY: CPT

## 2021-08-21 PROCEDURE — 70450 CT HEAD/BRAIN W/O DYE: CPT | Mod: MA

## 2021-08-21 PROCEDURE — 85610 PROTHROMBIN TIME: CPT

## 2021-08-21 PROCEDURE — 93970 EXTREMITY STUDY: CPT

## 2021-08-21 PROCEDURE — 93005 ELECTROCARDIOGRAM TRACING: CPT

## 2021-08-21 PROCEDURE — 81001 URINALYSIS AUTO W/SCOPE: CPT

## 2021-08-21 PROCEDURE — 73030 X-RAY EXAM OF SHOULDER: CPT

## 2021-08-21 PROCEDURE — 83036 HEMOGLOBIN GLYCOSYLATED A1C: CPT

## 2021-08-21 PROCEDURE — 82607 VITAMIN B-12: CPT

## 2021-08-21 PROCEDURE — 0225U NFCT DS DNA&RNA 21 SARSCOV2: CPT

## 2021-08-21 PROCEDURE — 85027 COMPLETE CBC AUTOMATED: CPT

## 2021-08-21 PROCEDURE — 85730 THROMBOPLASTIN TIME PARTIAL: CPT

## 2021-08-21 PROCEDURE — 85025 COMPLETE CBC W/AUTO DIFF WBC: CPT

## 2021-08-21 PROCEDURE — 71045 X-RAY EXAM CHEST 1 VIEW: CPT

## 2021-08-21 PROCEDURE — 84443 ASSAY THYROID STIM HORMONE: CPT

## 2021-08-21 PROCEDURE — 97163 PT EVAL HIGH COMPLEX 45 MIN: CPT

## 2021-08-21 PROCEDURE — 83735 ASSAY OF MAGNESIUM: CPT

## 2021-08-21 RX ORDER — ERYTHROMYCIN BASE 5 MG/GRAM
1 OINTMENT (GRAM) OPHTHALMIC (EYE)
Qty: 0 | Refills: 0 | DISCHARGE
Start: 2021-08-21

## 2021-08-21 RX ORDER — HYDRALAZINE HCL 50 MG
1 TABLET ORAL
Qty: 0 | Refills: 0 | DISCHARGE
Start: 2021-08-21

## 2021-08-21 RX ORDER — SIMVASTATIN 20 MG/1
1 TABLET, FILM COATED ORAL
Qty: 0 | Refills: 0 | DISCHARGE
Start: 2021-08-21

## 2021-08-21 RX ORDER — WARFARIN SODIUM 2.5 MG/1
1 TABLET ORAL
Qty: 0 | Refills: 0 | DISCHARGE
Start: 2021-08-21

## 2021-08-21 RX ORDER — COLLAGENASE CLOSTRIDIUM HIST. 250 UNIT/G
1 OINTMENT (GRAM) TOPICAL
Qty: 0 | Refills: 0 | DISCHARGE
Start: 2021-08-21

## 2021-08-21 RX ORDER — NIFEDIPINE 30 MG
1 TABLET, EXTENDED RELEASE 24 HR ORAL
Qty: 0 | Refills: 0 | DISCHARGE
Start: 2021-08-21

## 2021-08-21 RX ORDER — SIMVASTATIN 20 MG/1
1 TABLET, FILM COATED ORAL
Qty: 0 | Refills: 0 | DISCHARGE

## 2021-08-21 RX ORDER — LOSARTAN POTASSIUM 100 MG/1
1 TABLET, FILM COATED ORAL
Qty: 0 | Refills: 0 | DISCHARGE
Start: 2021-08-21

## 2021-08-21 RX ORDER — METOPROLOL TARTRATE 50 MG
1 TABLET ORAL
Qty: 0 | Refills: 0 | DISCHARGE
Start: 2021-08-21

## 2021-08-21 RX ORDER — FLUTICASONE PROPIONATE 50 MCG
1 SPRAY, SUSPENSION NASAL
Qty: 0 | Refills: 0 | DISCHARGE

## 2021-08-21 RX ORDER — ACETAMINOPHEN 500 MG
2 TABLET ORAL
Qty: 0 | Refills: 0 | DISCHARGE
Start: 2021-08-21

## 2021-08-21 RX ORDER — CLOTRIMAZOLE 10 MG
1 TROCHE MUCOUS MEMBRANE
Qty: 0 | Refills: 0 | DISCHARGE

## 2021-08-21 RX ORDER — POLYETHYLENE GLYCOL 3350 17 G/17G
17 POWDER, FOR SOLUTION ORAL
Qty: 0 | Refills: 0 | DISCHARGE
Start: 2021-08-21

## 2021-08-21 RX ORDER — METOPROLOL TARTRATE 50 MG
1 TABLET ORAL
Qty: 0 | Refills: 0 | DISCHARGE

## 2021-08-21 RX ORDER — IRBESARTAN 75 MG/1
1 TABLET ORAL
Qty: 0 | Refills: 0 | DISCHARGE

## 2021-08-21 RX ORDER — WARFARIN SODIUM 2.5 MG/1
1 TABLET ORAL
Qty: 0 | Refills: 0 | DISCHARGE

## 2021-08-21 RX ORDER — SENNA PLUS 8.6 MG/1
2 TABLET ORAL
Qty: 0 | Refills: 0 | DISCHARGE
Start: 2021-08-21

## 2021-08-21 RX ORDER — RISEDRONATE SODIUM 25.8; 4.2 MG/1; MG/1
1 TABLET, FILM COATED ORAL
Qty: 0 | Refills: 0 | DISCHARGE

## 2021-08-21 RX ORDER — ERYTHROMYCIN BASE 5 MG/GRAM
1 OINTMENT (GRAM) OPHTHALMIC (EYE)
Qty: 0 | Refills: 0 | DISCHARGE

## 2021-08-21 RX ORDER — GABAPENTIN 400 MG/1
1 CAPSULE ORAL
Qty: 0 | Refills: 0 | DISCHARGE

## 2021-08-21 RX ORDER — GABAPENTIN 400 MG/1
2 CAPSULE ORAL
Qty: 0 | Refills: 0 | DISCHARGE
Start: 2021-08-21

## 2021-08-21 RX ADMIN — Medication 30 MILLIGRAM(S): at 06:33

## 2021-08-21 RX ADMIN — GABAPENTIN 200 MILLIGRAM(S): 400 CAPSULE ORAL at 06:33

## 2021-08-21 RX ADMIN — POLYETHYLENE GLYCOL 3350 17 GRAM(S): 17 POWDER, FOR SOLUTION ORAL at 06:34

## 2021-08-21 RX ADMIN — WARFARIN SODIUM 4 MILLIGRAM(S): 2.5 TABLET ORAL at 06:33

## 2021-08-21 RX ADMIN — Medication 1 APPLICATION(S): at 06:33

## 2021-08-21 RX ADMIN — Medication 1 APPLICATION(S): at 12:30

## 2021-08-21 RX ADMIN — LOSARTAN POTASSIUM 100 MILLIGRAM(S): 100 TABLET, FILM COATED ORAL at 06:33

## 2021-08-21 RX ADMIN — Medication 25 MILLIGRAM(S): at 06:33

## 2021-08-21 NOTE — DISCHARGE NOTE NURSING/CASE MANAGEMENT/SOCIAL WORK - PATIENT PORTAL LINK FT
You can access the FollowMyHealth Patient Portal offered by [facility] by registering at the following website: [email or web address]. By joining [facility]’s FollowMyHealth portal, you will also be able to view your health information using other applications (apps) compatible with our system.

## 2021-08-21 NOTE — DISCHARGE NOTE NURSING/CASE MANAGEMENT/SOCIAL WORK - NSDCPEFALRISK_GEN_ALL_CORE
For information on Fall & injury Prevention, visit [email or web address]

## 2022-09-21 ENCOUNTER — INPATIENT (INPATIENT)
Facility: HOSPITAL | Age: 87
LOS: 11 days | Discharge: NURSING, REHAB OR LONG-TERM CARE | DRG: 640 | End: 2022-10-03
Attending: INTERNAL MEDICINE | Admitting: INTERNAL MEDICINE
Payer: COMMERCIAL

## 2022-09-21 VITALS — HEIGHT: 66 IN

## 2022-09-21 DIAGNOSIS — Z96.659 PRESENCE OF UNSPECIFIED ARTIFICIAL KNEE JOINT: Chronic | ICD-10-CM

## 2022-09-21 DIAGNOSIS — Z98.51 TUBAL LIGATION STATUS: Chronic | ICD-10-CM

## 2022-09-21 LAB
ALBUMIN SERPL ELPH-MCNC: 3.3 G/DL — SIGNIFICANT CHANGE UP (ref 3.5–5)
ALP SERPL-CCNC: 80 U/L — SIGNIFICANT CHANGE UP (ref 40–120)
ALT FLD-CCNC: 34 U/L DA — SIGNIFICANT CHANGE UP (ref 10–60)
ANION GAP SERPL CALC-SCNC: 6 MMOL/L — SIGNIFICANT CHANGE UP (ref 5–17)
APTT BLD: 39 SEC — HIGH (ref 27.5–35.5)
AST SERPL-CCNC: 38 U/L — SIGNIFICANT CHANGE UP (ref 10–40)
BASOPHILS # BLD AUTO: 0.03 K/UL — SIGNIFICANT CHANGE UP (ref 0–0.2)
BASOPHILS NFR BLD AUTO: 0.4 % — SIGNIFICANT CHANGE UP (ref 0–2)
BILIRUB SERPL-MCNC: 0.7 MG/DL — SIGNIFICANT CHANGE UP (ref 0.2–1.2)
BUN SERPL-MCNC: 23 MG/DL — SIGNIFICANT CHANGE UP (ref 7–18)
CALCIUM SERPL-MCNC: 10.1 MG/DL — SIGNIFICANT CHANGE UP (ref 8.4–10.5)
CHLORIDE SERPL-SCNC: 116 MMOL/L — HIGH (ref 96–108)
CO2 SERPL-SCNC: 28 MMOL/L — SIGNIFICANT CHANGE UP (ref 22–31)
CREAT SERPL-MCNC: 1.32 MG/DL — HIGH (ref 0.5–1.3)
EGFR: 39 ML/MIN/1.73M2 — LOW
EOSINOPHIL # BLD AUTO: 0.01 K/UL — SIGNIFICANT CHANGE UP (ref 0–0.5)
EOSINOPHIL NFR BLD AUTO: 0.1 % — SIGNIFICANT CHANGE UP (ref 0–6)
GLUCOSE SERPL-MCNC: 97 MG/DL — SIGNIFICANT CHANGE UP (ref 70–99)
HCT VFR BLD CALC: 47.5 % — HIGH (ref 34.5–45)
HGB BLD-MCNC: 14.8 G/DL — SIGNIFICANT CHANGE UP (ref 11.5–15.5)
IMM GRANULOCYTES NFR BLD AUTO: 0.2 % — SIGNIFICANT CHANGE UP (ref 0–0.9)
INR BLD: 2.22 RATIO — HIGH (ref 0.88–1.16)
LYMPHOCYTES # BLD AUTO: 1.1 K/UL — SIGNIFICANT CHANGE UP (ref 1–3.3)
LYMPHOCYTES # BLD AUTO: 13.1 % — SIGNIFICANT CHANGE UP (ref 13–44)
MAGNESIUM SERPL-MCNC: 2.3 MG/DL — SIGNIFICANT CHANGE UP (ref 1.6–2.6)
MCHC RBC-ENTMCNC: 30.1 PG — SIGNIFICANT CHANGE UP (ref 27–34)
MCHC RBC-ENTMCNC: 31.2 GM/DL — LOW (ref 32–36)
MCV RBC AUTO: 96.5 FL — SIGNIFICANT CHANGE UP (ref 80–100)
MONOCYTES # BLD AUTO: 0.78 K/UL — SIGNIFICANT CHANGE UP (ref 0–0.9)
MONOCYTES NFR BLD AUTO: 9.3 % — SIGNIFICANT CHANGE UP (ref 2–14)
NEUTROPHILS # BLD AUTO: 6.48 K/UL — SIGNIFICANT CHANGE UP (ref 1.8–7.4)
NEUTROPHILS NFR BLD AUTO: 76.9 % — SIGNIFICANT CHANGE UP (ref 43–77)
NRBC # BLD: 0 /100 WBCS — SIGNIFICANT CHANGE UP (ref 0–0)
PHOSPHATE SERPL-MCNC: 2.9 MG/DL — SIGNIFICANT CHANGE UP (ref 2.5–4.5)
PLATELET # BLD AUTO: 151 K/UL — SIGNIFICANT CHANGE UP (ref 150–400)
POTASSIUM SERPL-MCNC: 4.5 MMOL/L — SIGNIFICANT CHANGE UP (ref 3.5–5.3)
POTASSIUM SERPL-SCNC: 4.5 MMOL/L — SIGNIFICANT CHANGE UP (ref 3.5–5.3)
PROT SERPL-MCNC: 8.6 G/DL — HIGH (ref 6–8.3)
PROTHROM AB SERPL-ACNC: 26.6 SEC — HIGH (ref 10.5–13.4)
RBC # BLD: 4.92 M/UL — SIGNIFICANT CHANGE UP (ref 3.8–5.2)
RBC # FLD: 13.1 % — SIGNIFICANT CHANGE UP (ref 10.3–14.5)
SODIUM SERPL-SCNC: 152 MMOL/L — HIGH (ref 135–145)
WBC # BLD: 8.42 K/UL — SIGNIFICANT CHANGE UP (ref 3.8–10.5)
WBC # FLD AUTO: 8.42 K/UL — SIGNIFICANT CHANGE UP (ref 3.8–10.5)

## 2022-09-21 PROCEDURE — 99285 EMERGENCY DEPT VISIT HI MDM: CPT

## 2022-09-21 PROCEDURE — 71045 X-RAY EXAM CHEST 1 VIEW: CPT | Mod: 26

## 2022-09-21 RX ORDER — OLANZAPINE 15 MG/1
2.5 TABLET, FILM COATED ORAL ONCE
Refills: 0 | Status: COMPLETED | OUTPATIENT
Start: 2022-09-21 | End: 2022-09-21

## 2022-09-21 RX ORDER — HALOPERIDOL DECANOATE 100 MG/ML
2.5 INJECTION INTRAMUSCULAR ONCE
Refills: 0 | Status: COMPLETED | OUTPATIENT
Start: 2022-09-21 | End: 2022-09-21

## 2022-09-21 RX ORDER — SODIUM CHLORIDE 9 MG/ML
1000 INJECTION, SOLUTION INTRAVENOUS ONCE
Refills: 0 | Status: COMPLETED | OUTPATIENT
Start: 2022-09-21 | End: 2022-09-21

## 2022-09-21 RX ADMIN — HALOPERIDOL DECANOATE 2.5 MILLIGRAM(S): 100 INJECTION INTRAMUSCULAR at 16:34

## 2022-09-21 RX ADMIN — OLANZAPINE 2.5 MILLIGRAM(S): 15 TABLET, FILM COATED ORAL at 17:20

## 2022-09-21 RX ADMIN — SODIUM CHLORIDE 1000 MILLILITER(S): 9 INJECTION, SOLUTION INTRAVENOUS at 19:58

## 2022-09-21 NOTE — ED PROVIDER NOTE - PHYSICAL EXAMINATION
GENERAL: non-cooperative with exam. Alert-not responsive to questions.   HEAD:  Atraumatic, Normocephalic  ENT: MMM; oropharynx clear  NECK: Supple, No JVD  CHEST/LUNG: Clear to auscultation bilaterally; No wheeze  HEART: Regular rate and rhythm; No murmurs, rubs, or gallops  ABDOMEN: Soft, Nontender, Nondistended; Bowel sounds present  EXTREMITIES:  2+ Peripheral Pulses, No clubbing, cyanosis, or edema  PSYCH: AAOx3  SKIN: No rashes or lesions

## 2022-09-21 NOTE — ED ADULT TRIAGE NOTE - CHIEF COMPLAINT QUOTE
Increased AMS, combative, decreased PO intake. Unable to assess vitals. Increased AMS, combative, decreased PO intake. Unable to assess vitals. MD Tai aware

## 2022-09-21 NOTE — ED ADULT NURSE NOTE - OBJECTIVE STATEMENT
patient BIBA from nursing home, combative, refusing to answer questions. patient is lying on stretcher, breathing spontaneously in no acute distress. denies pain

## 2022-09-21 NOTE — ED ADULT NURSE NOTE - NSIMPLEMENTINTERV_GEN_ALL_ED
Implemented All Fall with Harm Risk Interventions:  [place] to call system. Call bell, personal items and telephone within reach. Instruct patient to call for assistance. Room bathroom lighting operational. Non-slip footwear when patient is off stretcher. Physically safe environment: no spills, clutter or unnecessary equipment. Stretcher in lowest position, wheels locked, appropriate side rails in place. Provide visual cue, wrist band, yellow gown, etc. Monitor gait and stability. Monitor for mental status changes and reorient to person, place, and time. Review medications for side effects contributing to fall risk. Reinforce activity limits and safety measures with patient and family. Provide visual clues: red socks.

## 2022-09-21 NOTE — ED PROVIDER NOTE - OBJECTIVE STATEMENT
86 y/o F with PMHx of HTN , DM (not on any meds recently), DVT, b/l knee replacement, HLD, OA of knees ?Dementia sent from SNF for aggression and combative behavior.   Patient uncooperative refusing exam & not answering questions  Resides at [facility]  Unclear baseline mental status

## 2022-09-21 NOTE — ED PROVIDER NOTE - CLINICAL SUMMARY MEDICAL DECISION MAKING FREE TEXT BOX
88 y/o F with PMHx of HTN , DM (not on any meds recently), DVT, b/l knee replacement, HLD, OA of knees ?Dementia sent from SNF for aggression and combative behavior.   Possible delirium due to infection vs. intracranial insult vs. worsening dementia?  Plan for CTH, screening labs, urine studies, CXR/EKG  Antipsychotics needed to draw appropriate labs.

## 2022-09-22 DIAGNOSIS — I82.509 CHRONIC EMBOLISM AND THROMBOSIS OF UNSPECIFIED DEEP VEINS OF UNSPECIFIED LOWER EXTREMITY: ICD-10-CM

## 2022-09-22 DIAGNOSIS — Z29.9 ENCOUNTER FOR PROPHYLACTIC MEASURES, UNSPECIFIED: ICD-10-CM

## 2022-09-22 DIAGNOSIS — R41.82 ALTERED MENTAL STATUS, UNSPECIFIED: ICD-10-CM

## 2022-09-22 DIAGNOSIS — G25.81 RESTLESS LEGS SYNDROME: ICD-10-CM

## 2022-09-22 DIAGNOSIS — G47.00 INSOMNIA, UNSPECIFIED: ICD-10-CM

## 2022-09-22 DIAGNOSIS — G92.8 OTHER TOXIC ENCEPHALOPATHY: ICD-10-CM

## 2022-09-22 DIAGNOSIS — K59.00 CONSTIPATION, UNSPECIFIED: ICD-10-CM

## 2022-09-22 DIAGNOSIS — E11.9 TYPE 2 DIABETES MELLITUS WITHOUT COMPLICATIONS: ICD-10-CM

## 2022-09-22 DIAGNOSIS — E87.0 HYPEROSMOLALITY AND HYPERNATREMIA: ICD-10-CM

## 2022-09-22 DIAGNOSIS — I10 ESSENTIAL (PRIMARY) HYPERTENSION: ICD-10-CM

## 2022-09-22 DIAGNOSIS — M19.90 UNSPECIFIED OSTEOARTHRITIS, UNSPECIFIED SITE: ICD-10-CM

## 2022-09-22 DIAGNOSIS — N17.9 ACUTE KIDNEY FAILURE, UNSPECIFIED: ICD-10-CM

## 2022-09-22 DIAGNOSIS — E78.5 HYPERLIPIDEMIA, UNSPECIFIED: ICD-10-CM

## 2022-09-22 LAB
ALBUMIN SERPL ELPH-MCNC: 3.1 G/DL — LOW (ref 3.5–5)
ALP SERPL-CCNC: 69 U/L — SIGNIFICANT CHANGE UP (ref 40–120)
ALT FLD-CCNC: 36 U/L DA — SIGNIFICANT CHANGE UP (ref 10–60)
ANION GAP SERPL CALC-SCNC: 5 MMOL/L — SIGNIFICANT CHANGE UP (ref 5–17)
ANION GAP SERPL CALC-SCNC: 6 MMOL/L — SIGNIFICANT CHANGE UP (ref 5–17)
APPEARANCE UR: CLEAR — SIGNIFICANT CHANGE UP
AST SERPL-CCNC: 60 U/L — HIGH (ref 10–40)
BACTERIA # UR AUTO: ABNORMAL /HPF
BILIRUB SERPL-MCNC: 0.8 MG/DL — SIGNIFICANT CHANGE UP (ref 0.2–1.2)
BILIRUB UR-MCNC: NEGATIVE — SIGNIFICANT CHANGE UP
BUN SERPL-MCNC: 17 MG/DL — SIGNIFICANT CHANGE UP (ref 7–18)
BUN SERPL-MCNC: 23 MG/DL — HIGH (ref 7–18)
CALCIUM SERPL-MCNC: 9.1 MG/DL — SIGNIFICANT CHANGE UP (ref 8.4–10.5)
CALCIUM SERPL-MCNC: 9.5 MG/DL — SIGNIFICANT CHANGE UP (ref 8.4–10.5)
CHLORIDE SERPL-SCNC: 112 MMOL/L — HIGH (ref 96–108)
CHLORIDE SERPL-SCNC: 115 MMOL/L — HIGH (ref 96–108)
CO2 SERPL-SCNC: 28 MMOL/L — SIGNIFICANT CHANGE UP (ref 22–31)
CO2 SERPL-SCNC: 29 MMOL/L — SIGNIFICANT CHANGE UP (ref 22–31)
COLOR SPEC: YELLOW — SIGNIFICANT CHANGE UP
COMMENT - URINE: SIGNIFICANT CHANGE UP
CREAT SERPL-MCNC: 1.14 MG/DL — SIGNIFICANT CHANGE UP (ref 0.5–1.3)
CREAT SERPL-MCNC: 1.17 MG/DL — SIGNIFICANT CHANGE UP (ref 0.5–1.3)
DIFF PNL FLD: ABNORMAL
EGFR: 45 ML/MIN/1.73M2 — LOW
EGFR: 47 ML/MIN/1.73M2 — LOW
EPI CELLS # UR: SIGNIFICANT CHANGE UP /HPF
GLUCOSE SERPL-MCNC: 197 MG/DL — HIGH (ref 70–99)
GLUCOSE SERPL-MCNC: 88 MG/DL — SIGNIFICANT CHANGE UP (ref 70–99)
GLUCOSE UR QL: NEGATIVE — SIGNIFICANT CHANGE UP
HYALINE CASTS # UR AUTO: ABNORMAL /LPF
KETONES UR-MCNC: ABNORMAL
LEUKOCYTE ESTERASE UR-ACNC: NEGATIVE — SIGNIFICANT CHANGE UP
NITRITE UR-MCNC: NEGATIVE — SIGNIFICANT CHANGE UP
PH UR: 5 — SIGNIFICANT CHANGE UP (ref 5–8)
POTASSIUM SERPL-MCNC: 3.7 MMOL/L — SIGNIFICANT CHANGE UP (ref 3.5–5.3)
POTASSIUM SERPL-MCNC: 3.8 MMOL/L — SIGNIFICANT CHANGE UP (ref 3.5–5.3)
POTASSIUM SERPL-SCNC: 3.7 MMOL/L — SIGNIFICANT CHANGE UP (ref 3.5–5.3)
POTASSIUM SERPL-SCNC: 3.8 MMOL/L — SIGNIFICANT CHANGE UP (ref 3.5–5.3)
PROT SERPL-MCNC: 7.5 G/DL — SIGNIFICANT CHANGE UP (ref 6–8.3)
PROT UR-MCNC: 15
RBC CASTS # UR COMP ASSIST: NEGATIVE /HPF — SIGNIFICANT CHANGE UP (ref 0–2)
SARS-COV-2 RNA SPEC QL NAA+PROBE: SIGNIFICANT CHANGE UP
SODIUM SERPL-SCNC: 146 MMOL/L — HIGH (ref 135–145)
SODIUM SERPL-SCNC: 149 MMOL/L — HIGH (ref 135–145)
SP GR SPEC: 1.01 — SIGNIFICANT CHANGE UP (ref 1.01–1.02)
TSH SERPL-MCNC: 1.76 UU/ML — SIGNIFICANT CHANGE UP (ref 0.34–4.82)
UROBILINOGEN FLD QL: NEGATIVE — SIGNIFICANT CHANGE UP
WBC UR QL: SIGNIFICANT CHANGE UP /HPF (ref 0–5)

## 2022-09-22 PROCEDURE — 99222 1ST HOSP IP/OBS MODERATE 55: CPT

## 2022-09-22 PROCEDURE — 93010 ELECTROCARDIOGRAM REPORT: CPT

## 2022-09-22 PROCEDURE — 99223 1ST HOSP IP/OBS HIGH 75: CPT | Mod: GC

## 2022-09-22 PROCEDURE — 70450 CT HEAD/BRAIN W/O DYE: CPT | Mod: 26,MA

## 2022-09-22 RX ORDER — WARFARIN SODIUM 2.5 MG/1
2 TABLET ORAL DAILY
Refills: 0 | Status: COMPLETED | OUTPATIENT
Start: 2022-09-22 | End: 2022-09-24

## 2022-09-22 RX ORDER — SENNA PLUS 8.6 MG/1
2 TABLET ORAL AT BEDTIME
Refills: 0 | Status: DISCONTINUED | OUTPATIENT
Start: 2022-09-22 | End: 2022-10-03

## 2022-09-22 RX ORDER — LIDOCAINE 4 G/100G
1 CREAM TOPICAL DAILY
Refills: 0 | Status: DISCONTINUED | OUTPATIENT
Start: 2022-09-22 | End: 2022-10-03

## 2022-09-22 RX ORDER — ASCORBIC ACID 60 MG
500 TABLET,CHEWABLE ORAL DAILY
Refills: 0 | Status: DISCONTINUED | OUTPATIENT
Start: 2022-09-22 | End: 2022-10-03

## 2022-09-22 RX ORDER — LOSARTAN POTASSIUM 100 MG/1
100 TABLET, FILM COATED ORAL DAILY
Refills: 0 | Status: DISCONTINUED | OUTPATIENT
Start: 2022-09-22 | End: 2022-10-03

## 2022-09-22 RX ORDER — SIMVASTATIN 20 MG/1
10 TABLET, FILM COATED ORAL AT BEDTIME
Refills: 0 | Status: DISCONTINUED | OUTPATIENT
Start: 2022-09-22 | End: 2022-10-03

## 2022-09-22 RX ORDER — SODIUM CHLORIDE 9 MG/ML
1000 INJECTION, SOLUTION INTRAVENOUS
Refills: 0 | Status: DISCONTINUED | OUTPATIENT
Start: 2022-09-22 | End: 2022-09-28

## 2022-09-22 RX ORDER — POLYETHYLENE GLYCOL 3350 17 G/17G
17 POWDER, FOR SOLUTION ORAL DAILY
Refills: 0 | Status: DISCONTINUED | OUTPATIENT
Start: 2022-09-22 | End: 2022-10-03

## 2022-09-22 RX ORDER — ASCORBIC ACID 60 MG
1 TABLET,CHEWABLE ORAL
Qty: 0 | Refills: 0 | DISCHARGE

## 2022-09-22 RX ORDER — NIFEDIPINE 30 MG
30 TABLET, EXTENDED RELEASE 24 HR ORAL DAILY
Refills: 0 | Status: DISCONTINUED | OUTPATIENT
Start: 2022-09-22 | End: 2022-10-03

## 2022-09-22 RX ORDER — TRAZODONE HCL 50 MG
50 TABLET ORAL AT BEDTIME
Refills: 0 | Status: DISCONTINUED | OUTPATIENT
Start: 2022-09-22 | End: 2022-10-03

## 2022-09-22 RX ADMIN — Medication 30 MILLIGRAM(S): at 18:23

## 2022-09-22 RX ADMIN — Medication 500 MILLIGRAM(S): at 18:23

## 2022-09-22 RX ADMIN — SENNA PLUS 2 TABLET(S): 8.6 TABLET ORAL at 21:51

## 2022-09-22 RX ADMIN — Medication 50 MILLIGRAM(S): at 22:00

## 2022-09-22 RX ADMIN — WARFARIN SODIUM 2 MILLIGRAM(S): 2.5 TABLET ORAL at 21:52

## 2022-09-22 RX ADMIN — SODIUM CHLORIDE 50 MILLILITER(S): 9 INJECTION, SOLUTION INTRAVENOUS at 13:01

## 2022-09-22 RX ADMIN — LOSARTAN POTASSIUM 100 MILLIGRAM(S): 100 TABLET, FILM COATED ORAL at 18:23

## 2022-09-22 RX ADMIN — SIMVASTATIN 10 MILLIGRAM(S): 20 TABLET, FILM COATED ORAL at 21:52

## 2022-09-22 RX ADMIN — POLYETHYLENE GLYCOL 3350 17 GRAM(S): 17 POWDER, FOR SOLUTION ORAL at 18:23

## 2022-09-22 NOTE — CONSULT NOTE ADULT - ASSESSMENT
Acute toxic metabolic encephalopathy.      Transferred because of agitation; now calm.    Hypernatremia.  being SLOWLY corrected.    Dementia.        RECOMMENDATIONS    Slow correction of hypernatremia.     Methymalonic acid, homcysteine, vitamin D level, ESR, CRP, syphilis serology.   Acute toxic metabolic encephalopathy.      Transferred because of agitation; now calm.    Hypernatremia, being SLOWLY corrected.    Dementia.        RECOMMENDATIONS    Slow correction of hypernatremia.     R/O infection without fever/leucocytosis in elderly.     Methylmalonic acid, homocysteine, vitamin D level, ESR, CRP, syphilis serology, ammonia, GGT.    The assay for B12 does not measure B12 level directly.  False normal and false elevations (to or above the normal range) occur with pernicious anemia due to intrinsic factor antibodies, which may or may not be detected by antibody tests.  B12-like compounds of vegetable origin (a problem with vegans) without cobalamin activity also can lead to falsely normal or elevated determinations.  Methylmalonic acid (MMA) and homocysteine (Hcy) determinations are necessary to elucidate what is happening.    Irrespective of B12 and folate levels:       high Hcy w normal MMA implies folate deficiency;        high MMA and Hcy implies B12 deficiency +/- folate deficiency.           Acute toxic metabolic encephalopathy.      Transferred because of agitation; now calm.    Hypernatremia, being SLOWLY corrected.    Dementia with behavioral features of irritability and making somewhat snide remarks (fatemeh).       Bed-bound due to extreme hypertonicity and fixed postures of the LEs.  Whatever the cause or causes (myelopathy, longstanding lack of PROM exercises, . . . ) the problem would not be amenable to effective treatment.         RECOMMENDATIONS    Slow correction of hypernatremia.     R/O infection without fever/leucocytosis in elderly.     Methylmalonic acid, homocysteine, vitamin D level, ESR, CRP, syphilis serology, ammonia, GGT.    The assay for B12 does not measure B12 level directly.  False normal and false elevations (to or above the normal range) occur with pernicious anemia due to intrinsic factor antibodies, which may or may not be detected by antibody tests.  B12-like compounds of vegetable origin (a problem with vegans) without cobalamin activity also can lead to falsely normal or elevated determinations.  Methylmalonic acid (MMA) and homocysteine (Hcy) determinations are necessary to elucidate what is happening.    Irrespective of B12 and folate levels:       high Hcy w normal MMA implies folate deficiency;        high MMA and Hcy implies B12 deficiency +/- folate deficiency.           Acute toxic metabolic encephalopathy.      Transferred because of agitation; now calm.    Hypernatremia, being SLOWLY corrected.    Paraplegia.    Dementia with behavioral features of irritability and making somewhat snide remarks (fatemeh).       Bed-bound due to extreme hypertonicity and fixed postures of the LEs.  Whatever the cause or causes (myelopathy, longstanding lack of PROM exercises, . . . ) the problem would not be amenable to effective treatment.         RECOMMENDATIONS    Slow correction of hypernatremia.     R/O infection without fever/leucocytosis in elderly.     Methylmalonic acid, homocysteine, vitamin D level, ESR, CRP, syphilis serology, ammonia, GGT.    The assay for B12 does not measure B12 level directly.  False normal and false elevations (to or above the normal range) occur with pernicious anemia due to intrinsic factor antibodies, which may or may not be detected by antibody tests.  B12-like compounds of vegetable origin (a problem with vegans) without cobalamin activity also can lead to falsely normal or elevated determinations.  Methylmalonic acid (MMA) and homocysteine (Hcy) determinations are necessary to elucidate what is happening.    Irrespective of B12 and folate levels:       high Hcy w normal MMA implies folate deficiency;        high MMA and Hcy implies B12 deficiency +/- folate deficiency.

## 2022-09-22 NOTE — H&P ADULT - NSHPPHYSICALEXAM_GEN_ALL_CORE
PHYSICAL EXAMINATION:  GENERAL: NAD  HEAD:  Atraumatic, Normocephalic  EYES:  Conjunctiva and sclera clear, pupils are equal, round, and reactive to light and accommodation.  NECK: Supple, No JVD, trachea is midline, no evidence of thyroid enlargement, no lymphadenopathy or tenderness.  CHEST/LUNG: Clear to auscultation; No rales, rhonchi, wheezing, or rubs  HEART: Regular rate and rhythm; holosystolic murmur loudest at the right upper sternal border; no rubs or gallops  ABDOMEN: Soft, Nontender, Nondistended; Bowel sounds present  NERVOUS SYSTEM:  Alert & Oriented X0; intermittently answers simple questions with head nods; does not follow commands.  EXTREMITIES:  weak peripheral pulses. No clubbing, cyanosis, or edema  SKIN: Warm and dry. Dry flaky skin present to the BLE. No lesions, nodules or rashes are noted.

## 2022-09-22 NOTE — H&P ADULT - PROBLEM SELECTOR PLAN 5
PMH HTN  takes losartan, nifedipine as home meds  refused BP measurement upon admission  - c/w home medications

## 2022-09-22 NOTE — H&P ADULT - HISTORY OF PRESENT ILLNESS
HPI limited secondary to altered mental status, dementia    Pt is a 87F with a PMH dementia (alert and disoriented at baseline per chart), HTN, HLD, T2DM, chronic DVT, OA, restless leg syndrome, constipation, frequent UTIs, and insomnia who presents from [place] at [facility] for altered mental status. Pt was brought in yesterday afternoon due to agitation uncharacteristic of her baseline (alert and disoriented, but following simple commands).     In ED pt was administered 2.5mg haldol and 2.5mg olanzapine for agitation. Labs revealed Na of 152 and Creatinine of 1.32; pt was started on D5 1/2NS. CTH negative for acute infarct, UA negative, and CXR WNL. HPI limited secondary to altered mental status, dementia    Pt is a 87F with a PMH dementia (alert and disoriented at baseline per chart), HTN, HLD, T2DM, chronic DVT, OA, restless leg syndrome, constipation, frequent UTIs, and insomnia who presents from [place] at [facility] for altered mental status. Pt was brought in yesterday afternoon due to agitation uncharacteristic of her baseline (alert and disoriented, but following simple commands).   Spoke to patient's son Mr. Hill denies recent viral illnesses, or infection but did states his mom has decreased oral intake for the last several days and decreased appetite. Denies fevers, chills or nausea or vomiting.     In ED pt was administered 2.5mg haldol and 2.5mg olanzapine for agitation. Labs revealed Na of 152 and Creatinine of 1.32; pt was started on D5 1/2NS. CTH negative for acute infarct, UA negative, and CXR WNL.

## 2022-09-22 NOTE — H&P ADULT - PROBLEM SELECTOR PLAN 1
p/w agitation, A&Ox0 and not following commands  baseline alert but disoriented, follows simple commands  CTH negative  UA, CXR negative  - delirium precautions  - antipsychotics as needed for acute agitation p/w agitation, A&Ox0 and not following commands  baseline alert but disoriented, follows simple commands  CTH negative  UA, CXR negative  - delirium precautions  - antipsychotics if needed for acute agitation  - will add on TSH  - f/u folate and vitamin B12   - Neuro consult

## 2022-09-22 NOTE — H&P ADULT - PROBLEM SELECTOR PLAN 3
p/w Creat 1.32  baseline 0.9  - continue with IV hydration  - track CMPs  - avoid nephrotoxic medications p/w Creat 1.32, normalized s/p IVF   baseline 0.9  - continue with IV hydration  - monitor SCr   - avoid nephrotoxic medications

## 2022-09-22 NOTE — H&P ADULT - PROBLEM SELECTOR PLAN 4
PMH chronic DVT  takes Coumadin 2mg Qhs as home med  INR 2.22 upon admission  - resume home medication PMH chronic DVT  takes Coumadin 2mg Qhs as home med  INR 2.22 upon admission  - resume home medication  - daily INR (goal 2-3)

## 2022-09-22 NOTE — H&P ADULT - ASSESSMENT
Pt is a 87F with a PMH dementia (alert and disoriented at baseline per chart), HTN, HLD, T2DM, chronic DVT, OA, restless leg syndrome, constipation, frequent UTIs, and insomnia who presented from [place] at [facility] for altered mental status; pt admitted for acute toxic metabolic encephalopathy, hypernatremia, and CHARI.

## 2022-09-22 NOTE — H&P ADULT - PROBLEM SELECTOR PLAN 2
p/w Na 152  s/p 1L LR bolus in ED  currently on d5 1/2NS at 50mL/hr  - CMP Q4-6hrs, reduce Na by no more than 6-8 every 24 hours  - neuro checks Q4hrs p/w Na 152, currently Na 149   s/p 1L LR bolus in ED  currently on d5 1/2NS at 50mL/hr  - CMP Q4-6hrs, reduce Na by no more than 6-8 every 24 hours  - neuro checks Q4hrs

## 2022-09-22 NOTE — H&P ADULT - ATTENDING COMMENTS
Patient seen and examined with Housestaff    87 F with a PMH dementia (alert and disoriented at baseline per chart), HTN, HLD, Type 2DM, chronic DVT, OA, restless leg syndrome, constipation, frequent UTIs, and insomnia who presents from [place] at [facility] for altered mental status. Pt was brought in yesterday afternoon due to agitation uncharacteristic of her baseline (alert and disoriented, but following simple commands).     In ED pt was administered 2.5mg haldol and 2.5mg olanzapine for agitation. Labs revealed Na of 152 and Creatinine of 1.32; pt was started on D5 1/2NS. CTH negative for acute infarct, UA negative, and CXR WNL.    Vital Signs Last 24 Hrs  T(C): 36.9 (22 Sep 2022 11:12), Max: 37.3 (21 Sep 2022 16:26)  T(F): 98.4 (22 Sep 2022 11:12), Max: 99.1 (21 Sep 2022 16:26)  HR: 72 (22 Sep 2022 11:12) (70 - 97)  BP: 155/71 (22 Sep 2022 11:12) (144/84 - 197/76)  RR: 18 (22 Sep 2022 11:12) (16 - 18)  SpO2: 93% (22 Sep 2022 11:12) (93% - 100%) room air    P/E;  Psych: AAO x3  Neuro: No gross focal deficits; Power and sensation intact  CVS: S1S2 present, regular, no edema  Resp: BLAE+, No wheeze or Rhonchi  GI: Soft, BS+, Non tender, non distended  Extr: No  calf tenderness B/L Lower extremities  Skin: Warm and moist without any rashes    Labs:                        14.8   8.42  )-----------( 151      ( 21 Sep 2022 19:00 )             47.5   09-22    149<H>  |  115<H>  |  23<H>  ----------------------------<  88  3.8   |  28  |  1.17    Ca    9.5      22 Sep 2022 10:25  Phos  2.9     09-21  Mg     2.3     09-21    TPro  8.6<H>  /  Alb  3.3  /  TBili  0.7  /  DBili  x   /  AST  38  /  ALT  34  /  AlkPhos  80  09-21    D/D:  Acute Toxic and Metabolic Encephalopathy likely ppt by   Acute Hypernatremia due to dehydration likely poor oral intake  Acute Psychosis   Concern for CVA although less likely    Plan:  Medicine  IV fluids;   bedside dysphagia screen; if pass can give Pureed diet  Please get collateral information from Son/ Daughter listed in contacts Patient seen and examined with Housestaff    87 F with a PMH dementia (alert and disoriented at baseline per chart), HTN, HLD, Type 2DM, chronic DVT, OA, restless leg syndrome, constipation, frequent UTIs, and insomnia who presents from [place] at [facility] for altered mental status. Pt was brought in yesterday afternoon due to agitation uncharacteristic of her baseline (alert and disoriented, but following simple commands).     In ED pt was administered 2.5mg haldol and 2.5mg olanzapine for agitation. Labs revealed Na of 152 and Creatinine of 1.32; pt was started on D5 1/2NS. CTH negative for acute infarct, UA negative, and CXR WNL.    Vital Signs Last 24 Hrs  T(C): 36.9 (22 Sep 2022 11:12), Max: 37.3 (21 Sep 2022 16:26)  T(F): 98.4 (22 Sep 2022 11:12), Max: 99.1 (21 Sep 2022 16:26)  HR: 72 (22 Sep 2022 11:12) (70 - 97)  BP: 155/71 (22 Sep 2022 11:12) (144/84 - 197/76)  RR: 18 (22 Sep 2022 11:12) (16 - 18)  SpO2: 93% (22 Sep 2022 11:12) (93% - 100%) room air    P/E; Limited  Psych: alert and awake, NON VERBAL; stiffness in B/L UE (intentional) slowly relax  Neuro: No gross focal deficits; Power and sensation intact  CVS: S1S2 present, regular, no edema  Resp: BLAE+, No wheeze or Rhonchi  GI: Soft, BS+, Non tender, non distended  Extr: No  calf tenderness B/L Lower extremities  Skin: Warm and dry     Labs:                      14.8   8.42  )-----------( 151      ( 21 Sep 2022 19:00 )             47.5   09-22  149<H>  |  115<H>  |  23<H>  ----------------------------<  88  3.8   |  28  |  1.17    Ca    9.5      22 Sep 2022 10:25  Phos  2.9     09-21  Mg     2.3     09-21    TPro  8.6<H>  /  Alb  3.3  /  TBili  0.7  /  DBili  x   /  AST  38  /  ALT  34  /  AlkPhos  80  09-21    D/D:  Acute Toxic and Metabolic Encephalopathy likely ppt by   Acute Hypernatremia due to dehydration likely poor oral intake  Acute Psychosis   Concern for CVA although less likely    Plan:  Medicine  IV fluids;   bedside dysphagia screen; if pass can give Pureed diet  Please get collateral information from Son/ Daughter listed in contacts  No evidence of UTI or Pneumonia or Abdominal Pathology at this time  Get TSH ADD ON rule out Hypothyroidism  Will get B12, Folate, Homocysteine and MMA, Vitamin D levels reversible causes   Neurology Consult  Bowel regimen  Continue Coumadin; INR therapeutic    Discussed with PGY2 ANDREA Self and MS4 and ED RN

## 2022-09-23 LAB
24R-OH-CALCIDIOL SERPL-MCNC: 26.9 NG/ML — LOW (ref 30–80)
ALBUMIN SERPL ELPH-MCNC: 2.8 G/DL — LOW (ref 3.5–5)
ALP SERPL-CCNC: 66 U/L — SIGNIFICANT CHANGE UP (ref 40–120)
ALT FLD-CCNC: 39 U/L DA — SIGNIFICANT CHANGE UP (ref 10–60)
ANION GAP SERPL CALC-SCNC: 7 MMOL/L — SIGNIFICANT CHANGE UP (ref 5–17)
AST SERPL-CCNC: 74 U/L — HIGH (ref 10–40)
BILIRUB SERPL-MCNC: 0.9 MG/DL — SIGNIFICANT CHANGE UP (ref 0.2–1.2)
BUN SERPL-MCNC: 15 MG/DL — SIGNIFICANT CHANGE UP (ref 7–18)
CALCIUM SERPL-MCNC: 9 MG/DL — SIGNIFICANT CHANGE UP (ref 8.4–10.5)
CHLORIDE SERPL-SCNC: 113 MMOL/L — HIGH (ref 96–108)
CO2 SERPL-SCNC: 28 MMOL/L — SIGNIFICANT CHANGE UP (ref 22–31)
CREAT SERPL-MCNC: 1.08 MG/DL — SIGNIFICANT CHANGE UP (ref 0.5–1.3)
CULTURE RESULTS: SIGNIFICANT CHANGE UP
EGFR: 50 ML/MIN/1.73M2 — LOW
FOLATE SERPL-MCNC: 16.3 NG/ML — SIGNIFICANT CHANGE UP
GLUCOSE SERPL-MCNC: 127 MG/DL — HIGH (ref 70–99)
HCT VFR BLD CALC: 44.1 % — SIGNIFICANT CHANGE UP (ref 34.5–45)
HCYS SERPL-MCNC: 8.7 UMOL/L — SIGNIFICANT CHANGE UP
HGB BLD-MCNC: 14 G/DL — SIGNIFICANT CHANGE UP (ref 11.5–15.5)
INR BLD: 2.4 RATIO — HIGH (ref 0.88–1.16)
MAGNESIUM SERPL-MCNC: 2.1 MG/DL — SIGNIFICANT CHANGE UP (ref 1.6–2.6)
MCHC RBC-ENTMCNC: 30.2 PG — SIGNIFICANT CHANGE UP (ref 27–34)
MCHC RBC-ENTMCNC: 31.7 GM/DL — LOW (ref 32–36)
MCV RBC AUTO: 95.2 FL — SIGNIFICANT CHANGE UP (ref 80–100)
NRBC # BLD: 0 /100 WBCS — SIGNIFICANT CHANGE UP (ref 0–0)
PHOSPHATE SERPL-MCNC: 2.6 MG/DL — SIGNIFICANT CHANGE UP (ref 2.5–4.5)
PLATELET # BLD AUTO: 146 K/UL — LOW (ref 150–400)
POTASSIUM SERPL-MCNC: 3.6 MMOL/L — SIGNIFICANT CHANGE UP (ref 3.5–5.3)
POTASSIUM SERPL-SCNC: 3.6 MMOL/L — SIGNIFICANT CHANGE UP (ref 3.5–5.3)
PROT SERPL-MCNC: 7.1 G/DL — SIGNIFICANT CHANGE UP (ref 6–8.3)
PROTHROM AB SERPL-ACNC: 28.8 SEC — HIGH (ref 10.5–13.4)
RBC # BLD: 4.63 M/UL — SIGNIFICANT CHANGE UP (ref 3.8–5.2)
RBC # FLD: 13.1 % — SIGNIFICANT CHANGE UP (ref 10.3–14.5)
SODIUM SERPL-SCNC: 148 MMOL/L — HIGH (ref 135–145)
SPECIMEN SOURCE: SIGNIFICANT CHANGE UP
VIT B12 SERPL-MCNC: >2000 PG/ML — HIGH (ref 232–1245)
VIT D25+D1,25 OH+D1,25 PNL SERPL-MCNC: 53.3 PG/ML — SIGNIFICANT CHANGE UP (ref 19.9–79.3)
WBC # BLD: 7.17 K/UL — SIGNIFICANT CHANGE UP (ref 3.8–10.5)
WBC # FLD AUTO: 7.17 K/UL — SIGNIFICANT CHANGE UP (ref 3.8–10.5)

## 2022-09-23 RX ADMIN — Medication 500 MILLIGRAM(S): at 16:08

## 2022-09-23 RX ADMIN — Medication 30 MILLIGRAM(S): at 06:09

## 2022-09-23 RX ADMIN — LIDOCAINE 1 PATCH: 4 CREAM TOPICAL at 19:34

## 2022-09-23 RX ADMIN — LOSARTAN POTASSIUM 100 MILLIGRAM(S): 100 TABLET, FILM COATED ORAL at 06:09

## 2022-09-23 RX ADMIN — LIDOCAINE 1 PATCH: 4 CREAM TOPICAL at 16:12

## 2022-09-23 NOTE — PROGRESS NOTE ADULT - PROBLEM SELECTOR PLAN 3
- p/w Creat 1.32, normalized s/p IVF   - baseline 0.9  - continue with IV hydration  - monitor SCr   - avoid nephrotoxic medications - improved  - p/w Creat 1.32  - s/p IVF  - baseline 0.9  - continue with IV hydration  - trend BMP  - avoid nephrotoxic medications

## 2022-09-23 NOTE — PATIENT PROFILE ADULT - FALL HARM RISK - HARM RISK INTERVENTIONS
Assistance with ambulation/Assistance OOB with selected safe patient handling equipment/Communicate Risk of Fall with Harm to all staff/Discuss with provider need for PT consult/Monitor for mental status changes/Monitor gait and stability/Move patient closer to nurses' station/Reinforce activity limits and safety measures with patient and family/Reorient to person, place and time as needed/Tailored Fall Risk Interventions/Toileting schedule using arm’s reach rule for commode and bathroom/Use of alarms - bed, chair and/or voice tab/Visual Cue: Yellow wristband and red socks/Bed in lowest position, wheels locked, appropriate side rails in place/Call bell, personal items and telephone in reach/Instruct patient to call for assistance before getting out of bed or chair/Non-slip footwear when patient is out of bed/[place] to call system/Physically safe environment - no spills, clutter or unnecessary equipment/Purposeful Proactive Rounding/Room/bathroom lighting operational, light cord in reach

## 2022-09-23 NOTE — PROGRESS NOTE ADULT - PROBLEM SELECTOR PLAN 4
PMH chronic DVT  takes Coumadin 2mg Qhs as home med  INR 2.22 upon admission  - resume home medication  - daily INR (goal 2-3) - takes Coumadin 2mg Qhs as home med  - INR 2.22 upon admission  - resume home medication  - trend daily INR (goal 2-3)

## 2022-09-23 NOTE — PROGRESS NOTE ADULT - PROBLEM SELECTOR PLAN 6
PMFANNY HLD  takes simvastatin as home med  - c/w home medications - continue with home medications simvastatin

## 2022-09-23 NOTE — PROGRESS NOTE ADULT - PROBLEM SELECTOR PLAN 5
PMH HTN  takes losartan, nifedipine as home meds  refused BP measurement upon admission  - c/w home medications - SBP controlled  - takes losartan, nifedipine as home meds  - c/w home medications with parameters

## 2022-09-23 NOTE — PROGRESS NOTE ADULT - PROBLEM SELECTOR PLAN 1
- p/w agitation, A&Ox0 and not following commands  - baseline alert but disoriented, follows simple commands  - CTH negative  - UA, CXR negative  - delirium precautions  - TSH wnl  - f/u folate and vitamin B12   - Neuro Dr. Olea following

## 2022-09-23 NOTE — PROGRESS NOTE ADULT - PROBLEM SELECTOR PLAN 2
- p/w Na 152, currently Na 148  - s/p 1L LR bolus in ED  - currently on d5 1/2NS at 50mL/hr  - trend BMP  - Neuro checks Q4hrs - p/w Na 152, currently Na 148  - s/p 1L LR bolus in ED  - continue d5 1/2NS at 50mL/hr  - trend BMP

## 2022-09-23 NOTE — PATIENT PROFILE ADULT - HEALTH LITERACY
no [de-identified] : Last seen one year. Patient has been generally well without any significant intercurrent issues or problems. Adherent with medications as noted without side effects. Appetite good and weight reportedly stable. Active with good exercise tolerance. No sx of chest pain, sob, palpitations, orthopnea, PND, PIEDRA, edema, lightheadedness..\par \par

## 2022-09-23 NOTE — PROGRESS NOTE ADULT - ASSESSMENT
Pt is a 87 year old female with a PMH dementia (alert and disoriented at baseline per chart), HTN, HLD, T2DM, chronic DVT, OA, restless leg syndrome, constipation, frequent UTIs, and insomnia who presented from [place] at [facility] for altered mental status including agitation uncharacteristic of her baseline. Son endorses that patient has had decreased oral intake for the last several days and decreased appetite. Admitted to medicine for acute toxic metabolic encephalopathy, hypernatremia, and CHARI.

## 2022-09-23 NOTE — PROGRESS NOTE ADULT - PROBLEM SELECTOR PLAN 8
PM OA  uses lidocaine patches in nursing home  - c/w lidocaine patches - takes trazadone at bedtime  - continue home medications  - monitor for sedation

## 2022-09-23 NOTE — PROGRESS NOTE ADULT - SUBJECTIVE AND OBJECTIVE BOX
Patient is a 87y old  Female who presents with a chief complaint of Altered Mental Status (22 Sep 2022 21:20)/metabolic encephalopathy/agitation/CHARI, on IV hydration, psych consult       INTERVAL HPI/OVERNIGHT EVENTS:  T(C): 36.3 (22 @ 07:24), Max: 36.9 (22 @ 11:12)  HR: 82 (22 @ 07:24) (72 - 82)  BP: 122/63 (22 @ 07:24) (122/63 - 166/80)  RR: 17 (22 @ 07:24) (17 - 18)  SpO2: 98% (22 @ 07:24) (93% - 98%)  Wt(kg): --    LABS:                        14.0   7.17  )-----------( 146      ( 23 Sep 2022 04:40 )             44.1         148<H>  |  113<H>  |  15  ----------------------------<  127<H>  3.6   |  28  |  1.08    Ca    9.0      23 Sep 2022 04:40  Phos  2.6       Mg     2.1         TPro  7.1  /  Alb  2.8<L>  /  TBili  0.9  /  DBili  x   /  AST  74<H>  /  ALT  39  /  AlkPhos  66  09-23    PT/INR - ( 23 Sep 2022 04:40 )   PT: 28.8 sec;   INR: 2.40 ratio         PTT - ( 21 Sep 2022 19:00 )  PTT:39.0 sec  Urinalysis Basic - ( 22 Sep 2022 01:30 )    Color: Yellow / Appearance: Clear / S.015 / pH: x  Gluc: x / Ketone: Small  / Bili: Negative / Urobili: Negative   Blood: x / Protein: 15 / Nitrite: Negative   Leuk Esterase: Negative / RBC: Negative /HPF / WBC 3-5 /HPF   Sq Epi: x / Non Sq Epi: Few /HPF / Bacteria: Few /HPF      CAPILLARY BLOOD GLUCOSE            RADIOLOGY & ADDITIONAL TESTS:    Consultant(s) Notes Reviewed:  [x ] YES  [ ] NO    PHYSICAL EXAM:  GENERAL: well built, well nourished  HEAD:  Atraumatic, Normocephalic  EYES: EOMI, PERRLA, conjunctiva and sclera clear  ENT: No tonsillar erythema, exudates, or enlargement; Moist mucous membranes, Good dentition, No lesions  NECK: Supple, No JVD, Normal thyroid, no enlarged nodes  NERVOUS SYSTEM:  Awake confused  CHEST/LUNG: B/L good air entry; No rales, rhonchi, or wheezing  HEART: S1S2 normal, no S3, Regular rate and rhythm; No murmurs, rubs, or gallops  ABDOMEN: Soft, Nontender, Nondistended; Bowel sounds present  EXTREMITIES:  2+ Peripheral Pulses, No clubbing, cyanosis, or edema  LYMPH: No lymphadenopathy noted  SKIN: No rashes or lesions    Care Discussed with Consultants/Other Providers [ x] YES  [ ] NO

## 2022-09-23 NOTE — PROGRESS NOTE ADULT - SUBJECTIVE AND OBJECTIVE BOX
NP Note discussed with  Primary Attending    Patient is a 87y old  Female who presents with a chief complaint of Altered Mental Status (23 Sep 2022 10:47)      INTERVAL HPI/OVERNIGHT EVENTS: no acute events overnight     MEDICATIONS  (STANDING):  ascorbic acid 500 milliGRAM(s) Oral daily  dextrose 5% + sodium chloride 0.45%. 1000 milliLiter(s) (50 mL/Hr) IV Continuous <Continuous>  lidocaine   4% Patch 1 Patch Transdermal daily  losartan 100 milliGRAM(s) Oral daily  NIFEdipine XL 30 milliGRAM(s) Oral daily  polyethylene glycol 3350 17 Gram(s) Oral daily  senna 2 Tablet(s) Oral at bedtime  simvastatin 10 milliGRAM(s) Oral at bedtime  traZODone 50 milliGRAM(s) Oral at bedtime  warfarin 2 milliGRAM(s) Oral daily    MEDICATIONS  (PRN):      __________________________________________________  REVIEW OF SYSTEMS:    unable to obtain due to mental status    Vital Signs Last 24 Hrs  T(C): 36.3 (23 Sep 2022 15:57), Max: 36.7 (23 Sep 2022 06:00)  T(F): 97.4 (23 Sep 2022 15:57), Max: 98 (23 Sep 2022 06:00)  HR: 86 (23 Sep 2022 15:57) (81 - 88)  BP: 160/71 (23 Sep 2022 15:57) (104/62 - 160/71)  BP(mean): --  RR: 18 (23 Sep 2022 15:57) (17 - 18)  SpO2: 95% (23 Sep 2022 15:57) (95% - 98%)    Parameters below as of 23 Sep 2022 15:57  Patient On (Oxygen Delivery Method): room air        ________________________________________________  PHYSICAL EXAM:  GENERAL: NAD  HEENT: Normocephalic;  conjunctivae and sclerae clear; moist mucous membranes;   NECK : supple  CHEST/LUNG: Clear to auscultation bilaterally  HEART: S1 S2 RRR  ABDOMEN: Soft, Nontender, Nondistended; Bowel sounds present  EXTREMITIES: no cyanosis; no edema; no calf tenderness  SKIN: warm and dry; no rash  NERVOUS SYSTEM:  Oriented to person; lethargic but arousable, not answering questions shaking head yes and No    _________________________________________________  LABS:                        14.0   7.17  )-----------( 146      ( 23 Sep 2022 04:40 )             44.1         148<H>  |  113<H>  |  15  ----------------------------<  127<H>  3.6   |  28  |  1.08    Ca    9.0      23 Sep 2022 04:40  Phos  2.6       Mg     2.1         TPro  7.1  /  Alb  2.8<L>  /  TBili  0.9  /  DBili  x   /  AST  74<H>  /  ALT  39  /  AlkPhos  66      PT/INR - ( 23 Sep 2022 04:40 )   PT: 28.8 sec;   INR: 2.40 ratio         PTT - ( 21 Sep 2022 19:00 )  PTT:39.0 sec  Urinalysis Basic - ( 22 Sep 2022 01:30 )    Color: Yellow / Appearance: Clear / S.015 / pH: x  Gluc: x / Ketone: Small  / Bili: Negative / Urobili: Negative   Blood: x / Protein: 15 / Nitrite: Negative   Leuk Esterase: Negative / RBC: Negative /HPF / WBC 3-5 /HPF   Sq Epi: x / Non Sq Epi: Few /HPF / Bacteria: Few /HPF      CAPILLARY BLOOD GLUCOSE            RADIOLOGY & ADDITIONAL TESTS:  < from: Xray Chest 1 View- PORTABLE-Urgent (Xray Chest 1 View- PORTABLE-Urgent .) (22 @ 22:40) >  There is a slightly increased interstitial pattern in the lungs compared   to 2021. Advanced shoulder degeneration and mild right   tracheal deviation at thoracic inlet again noted.    < end of copied text >      < from: CT Head No Cont (09.22.22 @ 03:50) >  IMPRESSION:  No acute intracranial hemorrhage or mass effect.    < end of copied text >      Imaging Personally Reviewed:  YES    Consultant(s) Notes Reviewed:   YES        Plan of care was discussed with patient and /or primary care giver; all questions and concerns were addressed and care was aligned with patient's wishes.

## 2022-09-23 NOTE — PROGRESS NOTE ADULT - ASSESSMENT
87 yr old female, from NH, H/O dementia/HTN/HLd/.DM/DVT/OA shoulder, admit hospital for AMS/agitation/combative at NH refused eating refused meds, refused care, CT head no acute finding, COVID-19 swab negative , CHARI/hypernatremia, start IV hydration, F/U urine culture , CXR no infiltrate, psych consult, fall precaution. DX AMS/agitation/behavior/metabolic encephaloapthy/CHARI/hypernatremia.

## 2022-09-24 RX ORDER — OLANZAPINE 15 MG/1
2.5 TABLET, FILM COATED ORAL AT BEDTIME
Refills: 0 | Status: DISCONTINUED | OUTPATIENT
Start: 2022-09-24 | End: 2022-10-03

## 2022-09-24 RX ORDER — OLANZAPINE 15 MG/1
5 TABLET, FILM COATED ORAL ONCE
Refills: 0 | Status: COMPLETED | OUTPATIENT
Start: 2022-09-24 | End: 2022-09-24

## 2022-09-24 RX ORDER — HYDRALAZINE HCL 50 MG
10 TABLET ORAL ONCE
Refills: 0 | Status: COMPLETED | OUTPATIENT
Start: 2022-09-24 | End: 2022-09-24

## 2022-09-24 RX ADMIN — SODIUM CHLORIDE 50 MILLILITER(S): 9 INJECTION, SOLUTION INTRAVENOUS at 17:20

## 2022-09-24 RX ADMIN — Medication 10 MILLIGRAM(S): at 18:06

## 2022-09-24 RX ADMIN — OLANZAPINE 2.5 MILLIGRAM(S): 15 TABLET, FILM COATED ORAL at 22:05

## 2022-09-24 RX ADMIN — WARFARIN SODIUM 2 MILLIGRAM(S): 2.5 TABLET ORAL at 22:06

## 2022-09-24 RX ADMIN — SENNA PLUS 2 TABLET(S): 8.6 TABLET ORAL at 22:06

## 2022-09-24 RX ADMIN — Medication 50 MILLIGRAM(S): at 22:06

## 2022-09-24 RX ADMIN — SIMVASTATIN 10 MILLIGRAM(S): 20 TABLET, FILM COATED ORAL at 22:06

## 2022-09-24 RX ADMIN — LIDOCAINE 1 PATCH: 4 CREAM TOPICAL at 04:10

## 2022-09-24 RX ADMIN — OLANZAPINE 5 MILLIGRAM(S): 15 TABLET, FILM COATED ORAL at 13:49

## 2022-09-24 NOTE — PROGRESS NOTE ADULT - SUBJECTIVE AND OBJECTIVE BOX
Patient is a 87y old  Female who presents with a chief complaint of Altered Mental Status (23 Sep 2022 17:01)/agitation/combative, refused eat/meds/care, F/U psych consult      INTERVAL HPI/OVERNIGHT EVENTS:  T(C): 37.3 (09-24-22 @ 13:25), Max: 37.3 (09-24-22 @ 13:25)  HR: 84 (09-24-22 @ 13:25) (83 - 86)  BP: 214/80 (09-24-22 @ 13:25) (139/69 - 214/80)  RR: 18 (09-24-22 @ 13:25) (18 - 18)  SpO2: 100% (09-24-22 @ 13:25) (95% - 100%)  Wt(kg): --    LABS:                        14.0   7.17  )-----------( 146      ( 23 Sep 2022 04:40 )             44.1     09-23    148<H>  |  113<H>  |  15  ----------------------------<  127<H>  3.6   |  28  |  1.08    Ca    9.0      23 Sep 2022 04:40  Phos  2.6     09-23  Mg     2.1     09-23    TPro  7.1  /  Alb  2.8<L>  /  TBili  0.9  /  DBili  x   /  AST  74<H>  /  ALT  39  /  AlkPhos  66  09-23    PT/INR - ( 23 Sep 2022 04:40 )   PT: 28.8 sec;   INR: 2.40 ratio             CAPILLARY BLOOD GLUCOSE            RADIOLOGY & ADDITIONAL TESTS:    Consultant(s) Notes Reviewed:  [x ] YES  [ ] NO    PHYSICAL EXAM:  GENERAL: well built, well nourished  HEAD:  Atraumatic, Normocephalic  EYES: EOMI, PERRLA, conjunctiva and sclera clear  ENT: No tonsillar erythema, exudates, or enlargement; Moist mucous membranes, Good dentition, No lesions  NECK: Supple, No JVD, Normal thyroid, no enlarged nodes  NERVOUS SYSTEM:  Awake agitated  CHEST/LUNG: B/L good air entry; No rales, rhonchi, or wheezing  HEART: S1S2 normal, no S3, Regular rate and rhythm; No murmurs, rubs, or gallops  ABDOMEN: Soft, Nontender, Nondistended; Bowel sounds present  EXTREMITIES:  2+ Peripheral Pulses, No clubbing, cyanosis, mild  edema  LYMPH: No lymphadenopathy noted  SKIN: No rashes or lesions    Care Discussed with Consultants/Other Providers [ x] YES  [ ] NO

## 2022-09-24 NOTE — DIETITIAN INITIAL EVALUATION ADULT - FACTORS AFF FOOD INTAKE
weakness, AMS, dementia, frequent UTI, diabetes, CHARI, DVT/difficulty with food procurement/preparation/persistent constipation/persistent lack of appetite/Christian/ethnic/cultural/personal food preferences/other (specify)

## 2022-09-24 NOTE — DIETITIAN INITIAL EVALUATION ADULT - PERTINENT MEDS FT
MEDICATIONS  (STANDING):  ascorbic acid 500 milliGRAM(s) Oral daily  dextrose 5% + sodium chloride 0.45%. 1000 milliLiter(s) (50 mL/Hr) IV Continuous <Continuous>  lidocaine   4% Patch 1 Patch Transdermal daily  losartan 100 milliGRAM(s) Oral daily  NIFEdipine XL 30 milliGRAM(s) Oral daily  OLANZapine 2.5 milliGRAM(s) Oral at bedtime  polyethylene glycol 3350 17 Gram(s) Oral daily  senna 2 Tablet(s) Oral at bedtime  simvastatin 10 milliGRAM(s) Oral at bedtime  traZODone 50 milliGRAM(s) Oral at bedtime  warfarin 2 milliGRAM(s) Oral daily    MEDICATIONS  (PRN):

## 2022-09-24 NOTE — DIETITIAN INITIAL EVALUATION ADULT - NSICDXPASTMEDICALHX_GEN_ALL_CORE_FT
PAST MEDICAL HISTORY:  Arthritis OA    Constipation     Diabetes T2DM    DVT (deep venous thrombosis) chronic    Frequent UTI     HLD (hyperlipidemia)     Hypertension     Insomnia     Restless leg syndrome

## 2022-09-24 NOTE — DIETITIAN INITIAL EVALUATION ADULT - OTHER INFO
Patient from [facility] admitted for metabolic encephalopathy. Visited pt. alert & "mildly" agitated, observed lunch tray at beside, "untouched", attempted to encourage po intake, pt. "stated leave her alone", d/w PCA/RN, pt. also refused to consume breakfast meal & becomes "more aggressive" towards staff. Per chart, "pt.decreased oral intake for the last several days and decreased appetite" at NH Psych/team & Neuro consulted.  [facility] from [facility] admitted for metabolic encephalopathy. Visited pt. alert & "mildly" agitated, observed lunch tray at beside, "untouched", attempted to encourage po intake, pt. "stated leave her alone", d/w PCA/RN, pt. also refused to consume breakfast meal & becomes "more aggressive" towards staff. Per chart, "pt.decreased oral intake for the last several days and decreased appetite" at NH noted. Psych/team & Neuro consulted.

## 2022-09-24 NOTE — PROGRESS NOTE ADULT - ASSESSMENT
87 yr old female, from NH, H/O dementia/HTN/HLd/.DM/DVT/OA shoulder, admit hospital for AMS/agitation/combative at NH refused eating refused meds, refused care, CT head no acute finding, COVID-19 swab negative , CHARI/hypernatremia, start IV hydration, F/U urine culture , CXR no infiltrate, psych consult, fall precaution. DX AMS/agitation/behavior/metabolic encephaloapthy/CHARI/hypernatremia. Still combative refused eat pulled IV line, refused meds refused care, Zyprexa IM prn for agitation, fall precaution, F/U psych, COVID-19 negative. Advance care discussed, full code

## 2022-09-24 NOTE — DIETITIAN INITIAL EVALUATION ADULT - PERTINENT LABORATORY DATA
09-23    148<H>  |  113<H>  |  15  ----------------------------<  127<H>  3.6   |  28  |  1.08    Ca    9.0      23 Sep 2022 04:40  Phos  2.6     09-23  Mg     2.1     09-23    TPro  7.1  /  Alb  2.8<L>  /  TBili  0.9  /  DBili  x   /  AST  74<H>  /  ALT  39  /  AlkPhos  66  09-23

## 2022-09-25 LAB
ANION GAP SERPL CALC-SCNC: 7 MMOL/L — SIGNIFICANT CHANGE UP (ref 5–17)
BUN SERPL-MCNC: 17 MG/DL — SIGNIFICANT CHANGE UP (ref 7–18)
CALCIUM SERPL-MCNC: 9.2 MG/DL — SIGNIFICANT CHANGE UP (ref 8.4–10.5)
CHLORIDE SERPL-SCNC: 110 MMOL/L — HIGH (ref 96–108)
CO2 SERPL-SCNC: 28 MMOL/L — SIGNIFICANT CHANGE UP (ref 22–31)
CREAT SERPL-MCNC: 1.25 MG/DL — SIGNIFICANT CHANGE UP (ref 0.5–1.3)
EGFR: 42 ML/MIN/1.73M2 — LOW
FLUAV AG NPH QL: SIGNIFICANT CHANGE UP
FLUBV AG NPH QL: SIGNIFICANT CHANGE UP
GLUCOSE SERPL-MCNC: 129 MG/DL — HIGH (ref 70–99)
HCT VFR BLD CALC: 43.4 % — SIGNIFICANT CHANGE UP (ref 34.5–45)
HGB BLD-MCNC: 13.7 G/DL — SIGNIFICANT CHANGE UP (ref 11.5–15.5)
MCHC RBC-ENTMCNC: 30.2 PG — SIGNIFICANT CHANGE UP (ref 27–34)
MCHC RBC-ENTMCNC: 31.6 GM/DL — LOW (ref 32–36)
MCV RBC AUTO: 95.6 FL — SIGNIFICANT CHANGE UP (ref 80–100)
NRBC # BLD: 0 /100 WBCS — SIGNIFICANT CHANGE UP (ref 0–0)
PLATELET # BLD AUTO: 155 K/UL — SIGNIFICANT CHANGE UP (ref 150–400)
POTASSIUM SERPL-MCNC: 3.4 MMOL/L — LOW (ref 3.5–5.3)
POTASSIUM SERPL-SCNC: 3.4 MMOL/L — LOW (ref 3.5–5.3)
RBC # BLD: 4.54 M/UL — SIGNIFICANT CHANGE UP (ref 3.8–5.2)
RBC # FLD: 13.2 % — SIGNIFICANT CHANGE UP (ref 10.3–14.5)
SARS-COV-2 RNA SPEC QL NAA+PROBE: SIGNIFICANT CHANGE UP
SARS-COV-2 RNA SPEC QL NAA+PROBE: SIGNIFICANT CHANGE UP
SODIUM SERPL-SCNC: 145 MMOL/L — SIGNIFICANT CHANGE UP (ref 135–145)
WBC # BLD: 9.06 K/UL — SIGNIFICANT CHANGE UP (ref 3.8–10.5)
WBC # FLD AUTO: 9.06 K/UL — SIGNIFICANT CHANGE UP (ref 3.8–10.5)

## 2022-09-25 PROCEDURE — 71045 X-RAY EXAM CHEST 1 VIEW: CPT | Mod: 26

## 2022-09-25 RX ORDER — ACETAMINOPHEN 500 MG
650 TABLET ORAL ONCE
Refills: 0 | Status: COMPLETED | OUTPATIENT
Start: 2022-09-25 | End: 2022-09-25

## 2022-09-25 RX ADMIN — POLYETHYLENE GLYCOL 3350 17 GRAM(S): 17 POWDER, FOR SOLUTION ORAL at 12:56

## 2022-09-25 RX ADMIN — Medication 500 MILLIGRAM(S): at 12:54

## 2022-09-25 RX ADMIN — Medication 50 MILLIGRAM(S): at 21:59

## 2022-09-25 RX ADMIN — LIDOCAINE 1 PATCH: 4 CREAM TOPICAL at 13:54

## 2022-09-25 RX ADMIN — SENNA PLUS 2 TABLET(S): 8.6 TABLET ORAL at 21:59

## 2022-09-25 RX ADMIN — Medication 30 MILLIGRAM(S): at 05:42

## 2022-09-25 RX ADMIN — SIMVASTATIN 10 MILLIGRAM(S): 20 TABLET, FILM COATED ORAL at 21:59

## 2022-09-25 RX ADMIN — Medication 650 MILLIGRAM(S): at 21:58

## 2022-09-25 RX ADMIN — LOSARTAN POTASSIUM 100 MILLIGRAM(S): 100 TABLET, FILM COATED ORAL at 05:42

## 2022-09-25 RX ADMIN — OLANZAPINE 2.5 MILLIGRAM(S): 15 TABLET, FILM COATED ORAL at 21:59

## 2022-09-25 NOTE — PROGRESS NOTE ADULT - ASSESSMENT
87 yr old female, from NH, H/O dementia/HTN/HLd/.DM/DVT/OA shoulder, admit hospital for AMS/agitation/combative at NH refused eating refused meds, refused care, CT head no acute finding, COVID-19 swab negative , CHARI/hypernatremia, start IV hydration, F/U urine culture , CXR no infiltrate, psych consult, fall precaution. DX AMS/agitation/behavior/metabolic encephaloapthy/CHARI/hypernatremia. Still combative refused eat pulled IV line, refused meds refused care, Zyprexa IM prn for agitation, zyprexa 2.5 mg po HS, fall precaution, F/U psych, COVID-19 negative. Advance care discussed, full code. Spiked fever 100.4 f yesterday , F/U COVID swab

## 2022-09-25 NOTE — PROGRESS NOTE ADULT - SUBJECTIVE AND OBJECTIVE BOX
Patient is a 87y old  Female who presents with a chief complaint of Altered mental status/agitation/metabolic encephalopathy, spiked 100.4F , F/U COVID swab     (24 Sep 2022 16:54)      INTERVAL HPI/OVERNIGHT EVENTS:  T(C): 37.6 (09-25-22 @ 05:27), Max: 38 (09-24-22 @ 20:42)  HR: 81 (09-25-22 @ 05:27) (73 - 90)  BP: 162/79 (09-25-22 @ 05:27) (152/62 - 214/80)  RR: 16 (09-25-22 @ 05:27) (16 - 18)  SpO2: 97% (09-25-22 @ 05:27) (97% - 100%)  Wt(kg): --    LABS:                        13.7   9.06  )-----------( 155      ( 25 Sep 2022 07:55 )             43.4     09-25    145  |  110<H>  |  17  ----------------------------<  129<H>  3.4<L>   |  28  |  1.25    Ca    9.2      25 Sep 2022 07:55          CAPILLARY BLOOD GLUCOSE            RADIOLOGY & ADDITIONAL TESTS:    Consultant(s) Notes Reviewed:  [x ] YES  [ ] NO    PHYSICAL EXAM:  GENERAL: well built, well nourished  HEAD:  Atraumatic, Normocephalic  EYES: EOMI, PERRLA, conjunctiva and sclera clear  ENT: No tonsillar erythema, exudates, or enlargement; Moist mucous membranes, Good dentition, No lesions  NECK: Supple, No JVD, Normal thyroid, no enlarged nodes  NERVOUS SYSTEM:  confused sleepy  CHEST/LUNG: B/L good air entry; No rales, rhonchi, or wheezing  HEART: S1S2 normal, no S3, Regular rate and rhythm; No murmurs, rubs, or gallops  ABDOMEN: Soft, Nontender, Nondistended; Bowel sounds present  EXTREMITIES:  2+ Peripheral Pulses, No clubbing, cyanosis, mild  edema  LYMPH: No lymphadenopathy noted  SKIN: No rashes or lesions    Care Discussed with Consultants/Other Providers [ x] YES  [ ] NO

## 2022-09-26 DIAGNOSIS — F03.91 UNSPECIFIED DEMENTIA WITH BEHAVIORAL DISTURBANCE: ICD-10-CM

## 2022-09-26 LAB
ANION GAP SERPL CALC-SCNC: 6 MMOL/L — SIGNIFICANT CHANGE UP (ref 5–17)
BUN SERPL-MCNC: 15 MG/DL — SIGNIFICANT CHANGE UP (ref 7–18)
CALCIUM SERPL-MCNC: 8.5 MG/DL — SIGNIFICANT CHANGE UP (ref 8.4–10.5)
CHLORIDE SERPL-SCNC: 109 MMOL/L — HIGH (ref 96–108)
CO2 SERPL-SCNC: 27 MMOL/L — SIGNIFICANT CHANGE UP (ref 22–31)
CREAT SERPL-MCNC: 1 MG/DL — SIGNIFICANT CHANGE UP (ref 0.5–1.3)
EGFR: 55 ML/MIN/1.73M2 — LOW
GLUCOSE SERPL-MCNC: 138 MG/DL — HIGH (ref 70–99)
HCT VFR BLD CALC: 42.5 % — SIGNIFICANT CHANGE UP (ref 34.5–45)
HGB BLD-MCNC: 13.4 G/DL — SIGNIFICANT CHANGE UP (ref 11.5–15.5)
INR BLD: 2.16 RATIO — HIGH (ref 0.88–1.16)
MCHC RBC-ENTMCNC: 29.8 PG — SIGNIFICANT CHANGE UP (ref 27–34)
MCHC RBC-ENTMCNC: 31.5 GM/DL — LOW (ref 32–36)
MCV RBC AUTO: 94.7 FL — SIGNIFICANT CHANGE UP (ref 80–100)
NRBC # BLD: 0 /100 WBCS — SIGNIFICANT CHANGE UP (ref 0–0)
PLATELET # BLD AUTO: 148 K/UL — LOW (ref 150–400)
POTASSIUM SERPL-MCNC: 3.5 MMOL/L — SIGNIFICANT CHANGE UP (ref 3.5–5.3)
POTASSIUM SERPL-SCNC: 3.5 MMOL/L — SIGNIFICANT CHANGE UP (ref 3.5–5.3)
PROTHROM AB SERPL-ACNC: 25.9 SEC — HIGH (ref 10.5–13.4)
RBC # BLD: 4.49 M/UL — SIGNIFICANT CHANGE UP (ref 3.8–5.2)
RBC # FLD: 13.1 % — SIGNIFICANT CHANGE UP (ref 10.3–14.5)
SARS-COV-2 RNA SPEC QL NAA+PROBE: SIGNIFICANT CHANGE UP
SODIUM SERPL-SCNC: 142 MMOL/L — SIGNIFICANT CHANGE UP (ref 135–145)
WBC # BLD: 8.56 K/UL — SIGNIFICANT CHANGE UP (ref 3.8–10.5)
WBC # FLD AUTO: 8.56 K/UL — SIGNIFICANT CHANGE UP (ref 3.8–10.5)

## 2022-09-26 PROCEDURE — 90792 PSYCH DIAG EVAL W/MED SRVCS: CPT | Mod: 95

## 2022-09-26 RX ORDER — ACETAMINOPHEN 500 MG
650 TABLET ORAL EVERY 6 HOURS
Refills: 0 | Status: DISCONTINUED | OUTPATIENT
Start: 2022-09-26 | End: 2022-09-26

## 2022-09-26 RX ORDER — CHOLECALCIFEROL (VITAMIN D3) 125 MCG
1000 CAPSULE ORAL DAILY
Refills: 0 | Status: DISCONTINUED | OUTPATIENT
Start: 2022-09-26 | End: 2022-10-03

## 2022-09-26 RX ORDER — WARFARIN SODIUM 2.5 MG/1
2 TABLET ORAL ONCE
Refills: 0 | Status: COMPLETED | OUTPATIENT
Start: 2022-09-26 | End: 2022-09-26

## 2022-09-26 RX ORDER — ACETAMINOPHEN 500 MG
650 TABLET ORAL EVERY 6 HOURS
Refills: 0 | Status: DISCONTINUED | OUTPATIENT
Start: 2022-09-26 | End: 2022-10-03

## 2022-09-26 RX ADMIN — SIMVASTATIN 10 MILLIGRAM(S): 20 TABLET, FILM COATED ORAL at 21:50

## 2022-09-26 RX ADMIN — Medication 500 MILLIGRAM(S): at 11:45

## 2022-09-26 RX ADMIN — LIDOCAINE 1 PATCH: 4 CREAM TOPICAL at 18:11

## 2022-09-26 RX ADMIN — Medication 650 MILLIGRAM(S): at 07:01

## 2022-09-26 RX ADMIN — Medication 650 MILLIGRAM(S): at 21:50

## 2022-09-26 RX ADMIN — Medication 650 MILLIGRAM(S): at 02:06

## 2022-09-26 RX ADMIN — Medication 30 MILLIGRAM(S): at 06:24

## 2022-09-26 RX ADMIN — OLANZAPINE 2.5 MILLIGRAM(S): 15 TABLET, FILM COATED ORAL at 21:51

## 2022-09-26 RX ADMIN — Medication 50 MILLIGRAM(S): at 21:50

## 2022-09-26 RX ADMIN — LIDOCAINE 1 PATCH: 4 CREAM TOPICAL at 11:45

## 2022-09-26 RX ADMIN — Medication 650 MILLIGRAM(S): at 06:31

## 2022-09-26 RX ADMIN — POLYETHYLENE GLYCOL 3350 17 GRAM(S): 17 POWDER, FOR SOLUTION ORAL at 11:46

## 2022-09-26 RX ADMIN — SODIUM CHLORIDE 50 MILLILITER(S): 9 INJECTION, SOLUTION INTRAVENOUS at 21:52

## 2022-09-26 RX ADMIN — WARFARIN SODIUM 2 MILLIGRAM(S): 2.5 TABLET ORAL at 21:51

## 2022-09-26 RX ADMIN — LOSARTAN POTASSIUM 100 MILLIGRAM(S): 100 TABLET, FILM COATED ORAL at 06:25

## 2022-09-26 RX ADMIN — SENNA PLUS 2 TABLET(S): 8.6 TABLET ORAL at 21:49

## 2022-09-26 NOTE — PROGRESS NOTE ADULT - SUBJECTIVE AND OBJECTIVE BOX
Patient is a 87y old  Female who presents with a chief complaint of Altered Mental Status/agitation (25 Sep 2022 11:20)/agitation/metabolic encephalopathy, still low grade fever, COVOID-19 swab negative      INTERVAL HPI/OVERNIGHT EVENTS:  T(C): 38 (09-26-22 @ 05:36), Max: 38.2 (09-25-22 @ 20:23)  HR: 84 (09-26-22 @ 05:36) (83 - 84)  BP: 130/61 (09-26-22 @ 05:36) (130/61 - 148/64)  RR: 17 (09-26-22 @ 05:36) (16 - 17)  SpO2: 96% (09-26-22 @ 05:36) (96% - 100%)  Wt(kg): --    LABS:                        13.4   8.56  )-----------( 148      ( 26 Sep 2022 06:52 )             42.5     09-26    142  |  109<H>  |  15  ----------------------------<  138<H>  3.5   |  27  |  1.00    Ca    8.5      26 Sep 2022 06:52          CAPILLARY BLOOD GLUCOSE            RADIOLOGY & ADDITIONAL TESTS:    Consultant(s) Notes Reviewed:  [x ] YES  [ ] NO    PHYSICAL EXAM:  GENERAL: well built, well nourished  HEAD:  Atraumatic, Normocephalic  EYES: EOMI, PERRLA, conjunctiva and sclera clear  ENT: No tonsillar erythema, exudates, or enlargement; Moist mucous membranes, Good dentition, No lesions  NECK: Supple, No JVD, Normal thyroid, no enlarged nodes  NERVOUS SYSTEM:  more calm  CHEST/LUNG: B/L good air entry; No rales, rhonchi, or wheezing  HEART: S1S2 normal, no S3, Regular rate and rhythm; No murmurs, rubs, or gallops  ABDOMEN: Soft, Nontender, Nondistended; Bowel sounds present  EXTREMITIES:  2+ Peripheral Pulses, No clubbing, cyanosis, right hand/finger swelling  LYMPH: No lymphadenopathy noted  SKIN: No rashes or lesions    Care Discussed with Consultants/Other Providers [ x] YES  [ ] NO

## 2022-09-26 NOTE — PROGRESS NOTE ADULT - SUBJECTIVE AND OBJECTIVE BOX
NP Note discussed with  primary attending    Patient is a 87y old  Female who presents with a chief complaint of Altered Mental Status (26 Sep 2022 09:53)      INTERVAL HPI/OVERNIGHT EVENTS: Pt aroused to name calling.  Denied HA, dizziness, SOB, CP, abdominal pain, nausea or vomiting.      MEDICATIONS  (STANDING):  ascorbic acid 500 milliGRAM(s) Oral daily  dextrose 5% + sodium chloride 0.45%. 1000 milliLiter(s) (50 mL/Hr) IV Continuous <Continuous>  lidocaine   4% Patch 1 Patch Transdermal daily  losartan 100 milliGRAM(s) Oral daily  NIFEdipine XL 30 milliGRAM(s) Oral daily  OLANZapine 2.5 milliGRAM(s) Oral at bedtime  polyethylene glycol 3350 17 Gram(s) Oral daily  senna 2 Tablet(s) Oral at bedtime  simvastatin 10 milliGRAM(s) Oral at bedtime  traZODone 50 milliGRAM(s) Oral at bedtime    MEDICATIONS  (PRN):  acetaminophen     Tablet .. 650 milliGRAM(s) Oral every 6 hours PRN Temp greater or equal to 38C (100.4F), Mild Pain (1 - 3), Moderate Pain (4 - 6)      __________________________________________________  REVIEW OF SYSTEMS:    CONSTITUTIONAL: No fever  EYES: No acute visual disturbances  NECK: No pain or stiffness  RESPIRATORY: No cough; No shortness of breath  CARDIOVASCULAR: No chest pain, no palpitations  GASTROINTESTINAL: No pain. No nausea or vomiting.  No diarrhea   NEUROLOGICAL: No headache or numbness, no tremors  MUSCULOSKELETAL: No joint pain, no muscle pain  GENITOURINARY: No dysuria, no frequency, no hesitancy  PSYCHIATRY: No depression , no anxiety  ALL OTHER  ROS negative        Vital Signs Last 24 Hrs  T(C): 37.5 (26 Sep 2022 10:47), Max: 38.2 (25 Sep 2022 20:23)  T(F): 99.5 (26 Sep 2022 10:47), Max: 100.7 (25 Sep 2022 20:23)  HR: 84 (26 Sep 2022 05:36) (83 - 84)  BP: 130/61 (26 Sep 2022 05:36) (130/61 - 148/64)  RR: 17 (26 Sep 2022 05:36) (16 - 17)  SpO2: 96% (26 Sep 2022 05:36) (96% - 100%)    Parameters below as of 26 Sep 2022 05:36  Patient On (Oxygen Delivery Method): room air        ________________________________________________  PHYSICAL EXAM:  GENERAL: NAD  HEENT: Normocephalic;  conjunctivae and sclerae clear; moist mucous membranes;   NECK : Supple  CHEST/LUNG: Clear to auscultation bilaterally with good air entry   HEART: S1 S2  regular; no murmurs, gallops or rubs  ABDOMEN: Soft, Nontender, Nondistended; Bowel sounds present x 4 quad  EXTREMITIES: No cyanosis; no edema; no calf tenderness  SKIN: Warm and dry; no rash  NERVOUS SYSTEM:  Awake and alert; Oriented to place and person, not time; no new deficits    _________________________________________________  LABS:                        13.4   8.56  )-----------( 148      ( 26 Sep 2022 06:52 )             42.5     09-26    142  |  109<H>  |  15  ----------------------------<  138<H>  3.5   |  27  |  1.00    Ca    8.5      26 Sep 2022 06:52          CAPILLARY BLOOD GLUCOSE            RADIOLOGY & ADDITIONAL TESTS:    Imaging Personally Reviewed:  YES    Consultant(s) Notes Reviewed:   YES    Care Discussed with Consultants :     Plan of care was discussed with patient and /or primary care giver; all questions and concerns were addressed and care was aligned with patient's wishes.     NP Note discussed with  primary attending    Patient is a 87y old  Female who presents with a chief complaint of Altered Mental Status (26 Sep 2022 09:53)      INTERVAL HPI/OVERNIGHT EVENTS: Pt aroused to name calling.  Denied HA, dizziness, SOB, CP, abdominal pain, nausea or vomiting.      MEDICATIONS  (STANDING):  ascorbic acid 500 milliGRAM(s) Oral daily  dextrose 5% + sodium chloride 0.45%. 1000 milliLiter(s) (50 mL/Hr) IV Continuous <Continuous>  lidocaine   4% Patch 1 Patch Transdermal daily  losartan 100 milliGRAM(s) Oral daily  NIFEdipine XL 30 milliGRAM(s) Oral daily  OLANZapine 2.5 milliGRAM(s) Oral at bedtime  polyethylene glycol 3350 17 Gram(s) Oral daily  senna 2 Tablet(s) Oral at bedtime  simvastatin 10 milliGRAM(s) Oral at bedtime  traZODone 50 milliGRAM(s) Oral at bedtime    MEDICATIONS  (PRN):  acetaminophen     Tablet .. 650 milliGRAM(s) Oral every 6 hours PRN Temp greater or equal to 38C (100.4F), Mild Pain (1 - 3), Moderate Pain (4 - 6)      __________________________________________________  REVIEW OF SYSTEMS:  Limited in demented pt     CONSTITUTIONAL: No fever  EYES: No acute visual disturbances  NECK: No pain or stiffness  RESPIRATORY: No cough; No shortness of breath  CARDIOVASCULAR: No chest pain, no palpitations  GASTROINTESTINAL: No pain. No nausea or vomiting.  No diarrhea   NEUROLOGICAL: No headache or numbness, no tremors  MUSCULOSKELETAL: No joint pain, no muscle pain  GENITOURINARY: No dysuria, no frequency, no hesitancy  PSYCHIATRY: No depression , no anxiety  ALL OTHER  ROS negative        Vital Signs Last 24 Hrs  T(C): 37.5 (26 Sep 2022 10:47), Max: 38.2 (25 Sep 2022 20:23)  T(F): 99.5 (26 Sep 2022 10:47), Max: 100.7 (25 Sep 2022 20:23)  HR: 84 (26 Sep 2022 05:36) (83 - 84)  BP: 130/61 (26 Sep 2022 05:36) (130/61 - 148/64)  RR: 17 (26 Sep 2022 05:36) (16 - 17)  SpO2: 96% (26 Sep 2022 05:36) (96% - 100%)    Parameters below as of 26 Sep 2022 05:36  Patient On (Oxygen Delivery Method): room air        ________________________________________________  PHYSICAL EXAM:  GENERAL: NAD  HEENT: Normocephalic;  conjunctivae and sclerae clear; moist mucous membranes;   NECK : Supple  CHEST/LUNG: Clear to auscultation bilaterally with good air entry   HEART: S1 S2  regular; no murmurs, gallops or rubs  ABDOMEN: Soft, Nontender, Nondistended; Bowel sounds present x 4 quad  EXTREMITIES: No cyanosis; no edema; no calf tenderness  SKIN: Warm and dry; no rash  NERVOUS SYSTEM:  Awake and alert; Oriented to place and person, not time; no new deficits    _________________________________________________  LABS:                        13.4   8.56  )-----------( 148      ( 26 Sep 2022 06:52 )             42.5     09-26    142  |  109<H>  |  15  ----------------------------<  138<H>  3.5   |  27  |  1.00    Ca    8.5      26 Sep 2022 06:52          CAPILLARY BLOOD GLUCOSE            RADIOLOGY & ADDITIONAL TESTS:    Imaging Personally Reviewed:  YES    Consultant(s) Notes Reviewed:   YES    Care Discussed with Consultants :     Plan of care was discussed with patient and /or primary care giver; all questions and concerns were addressed and care was aligned with patient's wishes.     NP Note discussed with  primary attending    Patient is a 87y old  Female who presents with a chief complaint of Altered Mental Status (26 Sep 2022 09:53)      INTERVAL HPI/OVERNIGHT EVENTS: Pt aroused to name calling.  Denied HA, dizziness, SOB, CP, abdominal pain, nausea or vomiting.      MEDICATIONS  (STANDING):  ascorbic acid 500 milliGRAM(s) Oral daily  dextrose 5% + sodium chloride 0.45%. 1000 milliLiter(s) (50 mL/Hr) IV Continuous <Continuous>  lidocaine   4% Patch 1 Patch Transdermal daily  losartan 100 milliGRAM(s) Oral daily  NIFEdipine XL 30 milliGRAM(s) Oral daily  OLANZapine 2.5 milliGRAM(s) Oral at bedtime  polyethylene glycol 3350 17 Gram(s) Oral daily  senna 2 Tablet(s) Oral at bedtime  simvastatin 10 milliGRAM(s) Oral at bedtime  traZODone 50 milliGRAM(s) Oral at bedtime    MEDICATIONS  (PRN):  acetaminophen     Tablet .. 650 milliGRAM(s) Oral every 6 hours PRN Temp greater or equal to 38C (100.4F), Mild Pain (1 - 3), Moderate Pain (4 - 6)      __________________________________________________  REVIEW OF SYSTEMS:  Limited in demented pt     CONSTITUTIONAL: No fever  EYES: No acute visual disturbances  NECK: No pain or stiffness  RESPIRATORY: No cough; No shortness of breath  CARDIOVASCULAR: No chest pain, no palpitations  GASTROINTESTINAL: No pain. No nausea or vomiting.  No diarrhea   NEUROLOGICAL: No headache or numbness, no tremors  MUSCULOSKELETAL: No joint pain, no muscle pain  GENITOURINARY: No dysuria, no frequency, no hesitancy  PSYCHIATRY: No depression , no anxiety  ALL OTHER  ROS negative        Vital Signs Last 24 Hrs  T(C): 37.5 (26 Sep 2022 10:47), Max: 38.2 (25 Sep 2022 20:23)  T(F): 99.5 (26 Sep 2022 10:47), Max: 100.7 (25 Sep 2022 20:23)  HR: 84 (26 Sep 2022 05:36) (83 - 84)  BP: 130/61 (26 Sep 2022 05:36) (130/61 - 148/64)  RR: 17 (26 Sep 2022 05:36) (16 - 17)  SpO2: 96% (26 Sep 2022 05:36) (96% - 100%)    Parameters below as of 26 Sep 2022 05:36  Patient On (Oxygen Delivery Method): room air        ________________________________________________  PHYSICAL EXAM:  GENERAL: NAD  HEENT: Normocephalic;  conjunctivae and sclerae clear; moist mucous membranes;   NECK : Supple  CHEST/LUNG: Clear to auscultation bilaterally with good air entry   HEART: S1 S2  regular; no murmurs, gallops or rubs  ABDOMEN: Soft, Nontender, Nondistended; Bowel sounds present x 4 quad.  (+) Avery.  EXTREMITIES: No cyanosis; no edema; no calf tenderness  SKIN: Warm and dry; no rash  NERVOUS SYSTEM:  Awake and alert; Oriented to place and person, not time; no new deficits    _________________________________________________  LABS:                        13.4   8.56  )-----------( 148      ( 26 Sep 2022 06:52 )             42.5     09-26    142  |  109<H>  |  15  ----------------------------<  138<H>  3.5   |  27  |  1.00    Ca    8.5      26 Sep 2022 06:52          CAPILLARY BLOOD GLUCOSE            RADIOLOGY & ADDITIONAL TESTS:    Imaging Personally Reviewed:  YES    Consultant(s) Notes Reviewed:   YES    Care Discussed with Consultants :     Plan of care was discussed with patient and /or primary care giver; all questions and concerns were addressed and care was aligned with patient's wishes.     NP Note discussed with  primary attending    Patient is a 87y old  Female who presents with a chief complaint of Altered Mental Status (26 Sep 2022 09:53)      INTERVAL HPI/OVERNIGHT EVENTS: Pt aroused to name calling.  Denied HA, dizziness, SOB, CP, abdominal pain, nausea or vomiting.      MEDICATIONS  (STANDING):  ascorbic acid 500 milliGRAM(s) Oral daily  dextrose 5% + sodium chloride 0.45%. 1000 milliLiter(s) (50 mL/Hr) IV Continuous <Continuous>  lidocaine   4% Patch 1 Patch Transdermal daily  losartan 100 milliGRAM(s) Oral daily  NIFEdipine XL 30 milliGRAM(s) Oral daily  OLANZapine 2.5 milliGRAM(s) Oral at bedtime  polyethylene glycol 3350 17 Gram(s) Oral daily  senna 2 Tablet(s) Oral at bedtime  simvastatin 10 milliGRAM(s) Oral at bedtime  traZODone 50 milliGRAM(s) Oral at bedtime    MEDICATIONS  (PRN):  acetaminophen     Tablet .. 650 milliGRAM(s) Oral every 6 hours PRN Temp greater or equal to 38C (100.4F), Mild Pain (1 - 3), Moderate Pain (4 - 6)      __________________________________________________  REVIEW OF SYSTEMS:  Limited in demented pt     CONSTITUTIONAL: No fever  EYES: No acute visual disturbances  NECK: No pain or stiffness  RESPIRATORY: No cough; No shortness of breath  CARDIOVASCULAR: No chest pain, no palpitations  GASTROINTESTINAL: No pain. No nausea or vomiting.  No diarrhea   NEUROLOGICAL: No headache or numbness, no tremors  MUSCULOSKELETAL: No joint pain, no muscle pain  GENITOURINARY: No dysuria, no frequency, no hesitancy  PSYCHIATRY: No depression , no anxiety  ALL OTHER  ROS negative        Vital Signs Last 24 Hrs  T(C): 37.5 (26 Sep 2022 10:47), Max: 38.2 (25 Sep 2022 20:23)  T(F): 99.5 (26 Sep 2022 10:47), Max: 100.7 (25 Sep 2022 20:23)  HR: 84 (26 Sep 2022 05:36) (83 - 84)  BP: 130/61 (26 Sep 2022 05:36) (130/61 - 148/64)  RR: 17 (26 Sep 2022 05:36) (16 - 17)  SpO2: 96% (26 Sep 2022 05:36) (96% - 100%)    Parameters below as of 26 Sep 2022 05:36  Patient On (Oxygen Delivery Method): room air        ________________________________________________  PHYSICAL EXAM:  GENERAL: NAD  HEENT: Normocephalic;  conjunctivae and sclerae clear; moist mucous membranes;   NECK : Supple  CHEST/LUNG: Clear to auscultation bilaterally with good air entry   HEART: S1 S2  regular; no murmurs, gallops or rubs  ABDOMEN: Soft, Nontender, Nondistended; Bowel sounds present x 4 quad.   EXTREMITIES: No cyanosis; no edema; no calf tenderness  SKIN: Warm and dry; no rash  NERVOUS SYSTEM:  Awake and alert; Oriented to place and person, not time; no new deficits    _________________________________________________  LABS:                        13.4   8.56  )-----------( 148      ( 26 Sep 2022 06:52 )             42.5     09-26    142  |  109<H>  |  15  ----------------------------<  138<H>  3.5   |  27  |  1.00    Ca    8.5      26 Sep 2022 06:52          CAPILLARY BLOOD GLUCOSE            RADIOLOGY & ADDITIONAL TESTS:    Imaging Personally Reviewed:  YES    Consultant(s) Notes Reviewed:   YES    Care Discussed with Consultants :     Plan of care was discussed with patient and /or primary care giver; all questions and concerns were addressed and care was aligned with patient's wishes.

## 2022-09-26 NOTE — BH CONSULTATION LIAISON ASSESSMENT NOTE - HPI (INCLUDE ILLNESS QUALITY, SEVERITY, DURATION, TIMING, CONTEXT, MODIFYING FACTORS, ASSOCIATED SIGNS AND SYMPTOMS)
This is a 87 , retired female, non-caregiver, domiciled at [facility], with no known formal past psychiatric illness history, and past medical history of dementia (alert and disoriented at baseline per chart), HTN, HLD, Type II DM, chronic DVT, osteoarthritis, restless leg syndrome, constipation, frequent UTIs and insomnia who presented on 9/21 with altered mental status including agitation not consistent with her baseline, found to be hypernatremic (Na 152) with acute kidney injury (Cr 1.32). Psychiatry consulted for patient being uncooperative with all aspects of her care including refusing PO intake.     On assessment, patient is complying with blood draw at bedside, nods her head that she is able to hear this writer but states "I'm not talking to nobody." Assessment is attempted after completion of blood drawl, however, she maintains her eyes closed and does not respond to any exam questions. Collateral is attempted from patient's son, Banner [phone] and director of nursing, Sharifa, at [phone] but no response from either. Review of collateral obtained from patient's son by hospitalist team on 9/22 is noted as follows: denies recent viral illnesses, or infection but states his mom has decreased oral intake for the last several days and decreased appetite. Denies fevers, chills or nausea or vomiting.

## 2022-09-26 NOTE — PROGRESS NOTE ADULT - PROBLEM SELECTOR PLAN 5
- Home med losartan, nifedipine as home meds  - BP stable   - Continue to monitor BP   - C/w Losartan and Nifedipine

## 2022-09-26 NOTE — BH CONSULTATION LIAISON ASSESSMENT NOTE - NSBHCHARTREVIEWVS_PSY_A_CORE FT
Oral infection
Vital Signs Last 24 Hrs  T(C): 37.5 (26 Sep 2022 13:45), Max: 38.2 (25 Sep 2022 20:23)  T(F): 99.5 (26 Sep 2022 13:45), Max: 100.7 (25 Sep 2022 20:23)  HR: 76 (26 Sep 2022 13:45) (76 - 84)  BP: 111/38 (26 Sep 2022 13:45) (111/38 - 148/64)  BP(mean): --  RR: 17 (26 Sep 2022 13:45) (17 - 17)  SpO2: 100% (26 Sep 2022 13:45) (96% - 100%)    Parameters below as of 26 Sep 2022 13:45  Patient On (Oxygen Delivery Method): room air

## 2022-09-26 NOTE — BH CONSULTATION LIAISON ASSESSMENT NOTE - NSBHCHARTREVIEWLAB_PSY_A_CORE FT
Vitamin D, 25-Hydroxy: 26.9 (Insufficiency)    Sodium, Serum: 152 mmol/L (09.21.22 @ 19:00)  Sodium, Serum: 142 mmol/L (09.26.22 @ 06:52)    Creatinine, Serum: 1.32 mg/dL (09.21.22 @ 19:00)  Creatinine, Serum: 1.17 mg/dL (09.22.22 @ 10:25)  Creatinine, Serum: 1.08 mg/dL (09.23.22 @ 04:40)  Creatinine, Serum: 1.25 mg/dL (09.25.22 @ 07:55)  Creatinine, Serum: 1.00 mg/dL (09.26.22 @ 06:52)

## 2022-09-26 NOTE — PROGRESS NOTE ADULT - PROBLEM SELECTOR PLAN 1
- p/w agitation, A&Ox0 and not following commands  - baseline alert but disoriented, follows simple commands  - CTH negative  - UA, CXR negative  - TSH wnl  - C/w supportive care   - folate and vitamin B12   - Neuro-Dr. Olea consulted appreciated  - Telepsych- - p/w agitation, A&Ox0 and not following commands  - baseline alert but disoriented, follows simple commands  - CTH negative  - UA, CXR negative  - TSH wnl  - C/w supportive care   - folate and vitamin B12   - Neuro-Dr. Olea consulted appreciated  - Telepsych-Dr. Vasquez consult pending-f/u recommendations - p/w agitation, A&Ox0 and not following commands  - baseline alert but disoriented, follows simple commands  - CTH negative  - UA, CXR negative  - TSH wnl  - C/w supportive care   - Folate wnl   - Vitamin B12 elevated ? supplementation vs. renal vs. hepatic    - Neuro-Dr. Olea consulted appreciated  - Telepsych-Dr. Vasquez consult pending-f/u recommendations - p/w agitation, A&Ox0 and not following commands  - baseline alert but disoriented, follows simple commands  - CTH negative  - CXR performed.  F/u official read pending.    - UA, CXR negative  - TSH wnl  - C/w supportive care   - Folate wnl   - Vitamin B12 elevated ? supplementation vs. renal vs. hepatic    - Neuro-Dr. Olea consulted appreciated  - Telepsych-Dr. Aggarwaleme consult pending-f/u recommendations

## 2022-09-26 NOTE — BH CONSULTATION LIAISON ASSESSMENT NOTE - NSBHCHARTREVIEWINVESTIGATE_PSY_A_CORE FT
< from: Xray Chest 1 View- PORTABLE-Urgent (Xray Chest 1 View- PORTABLE-Urgent .) (09.21.22 @ 22:40) >    There is a slightly increased interstitial pattern in the lungs compared   to August 17, 2021. Advanced shoulder degeneration and mild right   tracheal deviation at thoracic inlet again noted.    < end of copied text >    < from: CT Head No Cont (09.22.22 @ 03:50) >    FINDINGS:  No acute intracranial hemorrhage, mass effect or midline shift.  No CT evidence of acute large vascular territory infarct.  The ventricles and cortical sulci are within normal limits forage.  Scattered hypodensities in the periventricular white matter are   nonspecific, but likely sequela of small vessel ischemic disease.    The paranasal sinuses and mastoid air cells are well aerated. The native   ocular lenses are surgically absent.  No displaced calvarial fracture.    IMPRESSION:  No acute intracranial hemorrhage or mass effect.    < end of copied text >

## 2022-09-26 NOTE — BH CONSULTATION LIAISON ASSESSMENT NOTE - SUMMARY
This is a 87 , retired female, non-caregiver, domiciled at [place] at [facility], with no known formal past psychiatric illness history, and past medical history of dementia (alert and disoriented at baseline per chart), HTN, HLD, Type II DM, chronic DVT, osteoarthritis, restless leg syndrome, constipation, frequent UTIs and insomnia who presented on 9/21 with altered mental status including agitation not consistent with her baseline, found to be hypernatremic (Na 152) with acute kidney injury (Cr 1.32). Psychiatry consulted for patient being uncooperative with all aspects of her care including refusing PO intake but patient did not meaningfully engage in assessment and collateral contacts did not respond. Symptoms of disorientation, lethargy and fluctuations in degree of alertness / engagement and orientation are consistent with encephalopathy (aka delirium) which is likely multifactorial (iso hypernatremia, CHARI or other unknown sources). Cardiopulmonary status (i.e. 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 In addition to optimizing medical care, would also recommend environmental delirium provisions as outlined below. Given encephalopathy, it would be impossible to opine on the presence or absence of a superimposed depressive disorder at this time, though collateral information regarding her pre-illness functioning would be helpful to better determine if a major depressive episode or other psychiatric pathology is a contributing factor to this clinical picture.

## 2022-09-26 NOTE — PROGRESS NOTE ADULT - PROBLEM SELECTOR PLAN 3
- P/w Creat 1.32- baseline 0.9  - Improved  - C/w IVF  - Continue to avoid nephrotoxic medications   - Continue to monitor BMP in AM-f/u results

## 2022-09-26 NOTE — BH CONSULTATION LIAISON ASSESSMENT NOTE - NSBHCONSULTFOLLOWAFTERCARE_PSY_A_CORE FT
No verbalized or evident psychiatric contraindications to discharge at this juncture (please update tele-psychiatry if any safety threats arise, i.e. SI/HI/psychosis)

## 2022-09-26 NOTE — PROGRESS NOTE ADULT - PROBLEM SELECTOR PLAN 4
- Home med Coumadin 2mg QHS   - INR 2.22 upon admission  - C/w Coumadin   - Continue to monitor INR daily.  INR (Goal 2-3).  INR in AM-f/u results

## 2022-09-26 NOTE — BH CONSULTATION LIAISON ASSESSMENT NOTE - CURRENT MEDICATION
MEDICATIONS  (STANDING):  ascorbic acid 500 milliGRAM(s) Oral daily  dextrose 5% + sodium chloride 0.45%. 1000 milliLiter(s) (50 mL/Hr) IV Continuous <Continuous>  lidocaine   4% Patch 1 Patch Transdermal daily  losartan 100 milliGRAM(s) Oral daily  NIFEdipine XL 30 milliGRAM(s) Oral daily  OLANZapine 2.5 milliGRAM(s) Oral at bedtime  polyethylene glycol 3350 17 Gram(s) Oral daily  senna 2 Tablet(s) Oral at bedtime  simvastatin 10 milliGRAM(s) Oral at bedtime  traZODone 50 milliGRAM(s) Oral at bedtime    MEDICATIONS  (PRN):  acetaminophen     Tablet .. 650 milliGRAM(s) Oral every 6 hours PRN Temp greater or equal to 38C (100.4F), Mild Pain (1 - 3), Moderate Pain (4 - 6)

## 2022-09-26 NOTE — BH CONSULTATION LIAISON ASSESSMENT NOTE - NSBHCONSULTRECOMMENDOTHER_PSY_A_CORE FT
- Treat all known contributors to altered mental state as you are  - Replete insufficient vitamin D and consider CXR follow up (pt w/ intermittent temperature spikes >100)  - Avoid use of anticholinergic, benzodiazepine and opioid medications as these can worsen mental status  	-when opioids are absolutely needed, preference for low dose hydromorphone or oxycodone   - Maintain daytime wakefulness / night-time sleep              - Windows open during the day and mobilize as tolerated during the day;                 - Low lighting and noise avoidance to promote sleep  - Ensure access to any sensory aides used prior to admission (i.e. glasses, hearing aids etc)

## 2022-09-26 NOTE — PROGRESS NOTE ADULT - ASSESSMENT
87 yr old female, from NH, H/O dementia/HTN/HLd/.DM/DVT/OA shoulder, admit hospital for AMS/agitation/combative at NH refused eating refused meds, refused care, CT head no acute finding, COVID-19 swab negative , CHARI/hypernatremia, start IV hydration, F/U urine culture , CXR no infiltrate, psych consult, fall precaution. DX AMS/agitation/behavior/metabolic encephaloapthy/CHARI/hypernatremia. Still combative refused eat pulled IV line, refused meds refused care, Zyprexa IM prn for agitation, zyprexa 2.5 mg po HS, fall precaution, F/U psych, COVID-19 negative. Advance care discussed, full code. Still  spiked fever ,   COVID-19  swab negative , F/U repeat CXR, fall precaution

## 2022-09-26 NOTE — PROGRESS NOTE ADULT - ASSESSMENT
87 year old female, from [facility], with PMH of Dementia, HTN, HLD, DM, DVT (on Coumadin), & Osteoarthriris-shoulder.   Presented to the ED for refusal to eating,  refusing meds & care.  Admitted for AMS with agitation and combativeness at NH, CHARI, and Hypernatremia.        CT head no acute finding.  COVID-19 swab negative.  Urine culture negative, CXR no infiltrate, psych consult, fall precaution.     Combative refused eat pulled IV line, refused meds refused care.  Started Zyprexa IM PRN for agitation & Zyprexa 2.5mg po QHS    Hospital course complicated by fevers 100.4       87 year old female, from [facility], with PMH of Dementia, HTN, HLD, DM, DVT (on Coumadin), & Osteoarthriris-shoulder.   Presented to the ED for refusal to eating,  refusing meds & care.  Admitted for Toxic Metabolic Encephalopathy with agitation and combativeness, CHARI, and Hypernatremia.     CT head no acute finding.  COVID-19 swab negative.  Urine culture negative, CXR no infiltrate, psych consult, fall precaution.     Combative refused eat pulled IV line, refused meds refused care.  Started Zyprexa IM PRN for agitation & Zyprexa 2.5mg po QHS    Hospital course complicated by fevers 100.4       87 year old female, from [facility], with PMH of Dementia, HTN, HLD, DM, DVT (on Coumadin), & Osteoarthriris-shoulder.   Presented to the ED for refusal to eating,  refusing meds & care.  Admitted for Toxic Metabolic Encephalopathy with agitation and combativeness, CHARI, and Hypernatremia.     CT head no acute finding.  COVID-19 swab negative.  Urine culture negative, and CXR no infiltrate.    Combative refused eat pulled IV line, refused meds refused care.  Started Zyprexa IM PRN for agitation & Zyprexa 2.5mg po QHS    Hospital course complicated by fevers 100.4.      Pysch consult pending

## 2022-09-27 DIAGNOSIS — M79.89 OTHER SPECIFIED SOFT TISSUE DISORDERS: ICD-10-CM

## 2022-09-27 LAB
A1C WITH ESTIMATED AVERAGE GLUCOSE RESULT: 6.7 % — HIGH (ref 4–5.6)
ALBUMIN SERPL ELPH-MCNC: 2.2 G/DL — LOW (ref 3.5–5)
ALP SERPL-CCNC: 64 U/L — SIGNIFICANT CHANGE UP (ref 40–120)
ALT FLD-CCNC: 25 U/L DA — SIGNIFICANT CHANGE UP (ref 10–60)
ANION GAP SERPL CALC-SCNC: 8 MMOL/L — SIGNIFICANT CHANGE UP (ref 5–17)
AST SERPL-CCNC: 21 U/L — SIGNIFICANT CHANGE UP (ref 10–40)
BASOPHILS # BLD AUTO: 0.03 K/UL — SIGNIFICANT CHANGE UP (ref 0–0.2)
BASOPHILS NFR BLD AUTO: 0.3 % — SIGNIFICANT CHANGE UP (ref 0–2)
BILIRUB SERPL-MCNC: 0.5 MG/DL — SIGNIFICANT CHANGE UP (ref 0.2–1.2)
BUN SERPL-MCNC: 19 MG/DL — HIGH (ref 7–18)
CALCIUM SERPL-MCNC: 8.8 MG/DL — SIGNIFICANT CHANGE UP (ref 8.4–10.5)
CHLORIDE SERPL-SCNC: 108 MMOL/L — SIGNIFICANT CHANGE UP (ref 96–108)
CO2 SERPL-SCNC: 26 MMOL/L — SIGNIFICANT CHANGE UP (ref 22–31)
CREAT SERPL-MCNC: 1.28 MG/DL — SIGNIFICANT CHANGE UP (ref 0.5–1.3)
CRP SERPL-MCNC: 119 MG/L — HIGH
EGFR: 41 ML/MIN/1.73M2 — LOW
EOSINOPHIL # BLD AUTO: 0.02 K/UL — SIGNIFICANT CHANGE UP (ref 0–0.5)
EOSINOPHIL NFR BLD AUTO: 0.2 % — SIGNIFICANT CHANGE UP (ref 0–6)
ERYTHROCYTE [SEDIMENTATION RATE] IN BLOOD: 73 MM/HR — HIGH (ref 0–20)
ESTIMATED AVERAGE GLUCOSE: 146 MG/DL — HIGH (ref 68–114)
GLUCOSE SERPL-MCNC: 178 MG/DL — HIGH (ref 70–99)
HCT VFR BLD CALC: 42.7 % — SIGNIFICANT CHANGE UP (ref 34.5–45)
HGB BLD-MCNC: 13.7 G/DL — SIGNIFICANT CHANGE UP (ref 11.5–15.5)
IMM GRANULOCYTES NFR BLD AUTO: 0.5 % — SIGNIFICANT CHANGE UP (ref 0–0.9)
INR BLD: 1.8 RATIO — HIGH (ref 0.88–1.16)
LYMPHOCYTES # BLD AUTO: 1 K/UL — SIGNIFICANT CHANGE UP (ref 1–3.3)
LYMPHOCYTES # BLD AUTO: 11.3 % — LOW (ref 13–44)
MAGNESIUM SERPL-MCNC: 1.8 MG/DL — SIGNIFICANT CHANGE UP (ref 1.6–2.6)
MCHC RBC-ENTMCNC: 30 PG — SIGNIFICANT CHANGE UP (ref 27–34)
MCHC RBC-ENTMCNC: 32.1 GM/DL — SIGNIFICANT CHANGE UP (ref 32–36)
MCV RBC AUTO: 93.6 FL — SIGNIFICANT CHANGE UP (ref 80–100)
METHYLMALONATE SERPL-SCNC: 146 NMOL/L — SIGNIFICANT CHANGE UP (ref 0–378)
MONOCYTES # BLD AUTO: 1.16 K/UL — HIGH (ref 0–0.9)
MONOCYTES NFR BLD AUTO: 13.1 % — SIGNIFICANT CHANGE UP (ref 2–14)
NEUTROPHILS # BLD AUTO: 6.62 K/UL — SIGNIFICANT CHANGE UP (ref 1.8–7.4)
NEUTROPHILS NFR BLD AUTO: 74.6 % — SIGNIFICANT CHANGE UP (ref 43–77)
NRBC # BLD: 0 /100 WBCS — SIGNIFICANT CHANGE UP (ref 0–0)
PHOSPHATE SERPL-MCNC: 2.6 MG/DL — SIGNIFICANT CHANGE UP (ref 2.5–4.5)
PLATELET # BLD AUTO: 154 K/UL — SIGNIFICANT CHANGE UP (ref 150–400)
POTASSIUM SERPL-MCNC: 3.7 MMOL/L — SIGNIFICANT CHANGE UP (ref 3.5–5.3)
POTASSIUM SERPL-SCNC: 3.7 MMOL/L — SIGNIFICANT CHANGE UP (ref 3.5–5.3)
PROT SERPL-MCNC: 7 G/DL — SIGNIFICANT CHANGE UP (ref 6–8.3)
PROTHROM AB SERPL-ACNC: 21.5 SEC — HIGH (ref 10.5–13.4)
RBC # BLD: 4.56 M/UL — SIGNIFICANT CHANGE UP (ref 3.8–5.2)
RBC # FLD: 12.9 % — SIGNIFICANT CHANGE UP (ref 10.3–14.5)
SODIUM SERPL-SCNC: 142 MMOL/L — SIGNIFICANT CHANGE UP (ref 135–145)
URATE SERPL-MCNC: 5.6 MG/DL — SIGNIFICANT CHANGE UP (ref 2.5–7)
WBC # BLD: 8.87 K/UL — SIGNIFICANT CHANGE UP (ref 3.8–10.5)
WBC # FLD AUTO: 8.87 K/UL — SIGNIFICANT CHANGE UP (ref 3.8–10.5)

## 2022-09-27 PROCEDURE — 73120 X-RAY EXAM OF HAND: CPT | Mod: 26,50

## 2022-09-27 PROCEDURE — 99233 SBSQ HOSP IP/OBS HIGH 50: CPT | Mod: 95

## 2022-09-27 RX ORDER — OLANZAPINE 15 MG/1
2.5 TABLET, FILM COATED ORAL EVERY 8 HOURS
Refills: 0 | Status: DISCONTINUED | OUTPATIENT
Start: 2022-09-27 | End: 2022-10-03

## 2022-09-27 RX ORDER — WARFARIN SODIUM 2.5 MG/1
2 TABLET ORAL ONCE
Refills: 0 | Status: COMPLETED | OUTPATIENT
Start: 2022-09-27 | End: 2022-09-27

## 2022-09-27 RX ADMIN — POLYETHYLENE GLYCOL 3350 17 GRAM(S): 17 POWDER, FOR SOLUTION ORAL at 12:59

## 2022-09-27 RX ADMIN — Medication 500 MILLIGRAM(S): at 12:54

## 2022-09-27 RX ADMIN — Medication 40 MILLIGRAM(S): at 22:14

## 2022-09-27 RX ADMIN — Medication 50 MILLIGRAM(S): at 22:13

## 2022-09-27 RX ADMIN — Medication 650 MILLIGRAM(S): at 07:03

## 2022-09-27 RX ADMIN — WARFARIN SODIUM 2 MILLIGRAM(S): 2.5 TABLET ORAL at 22:13

## 2022-09-27 RX ADMIN — SIMVASTATIN 10 MILLIGRAM(S): 20 TABLET, FILM COATED ORAL at 22:13

## 2022-09-27 RX ADMIN — OLANZAPINE 2.5 MILLIGRAM(S): 15 TABLET, FILM COATED ORAL at 22:13

## 2022-09-27 RX ADMIN — Medication 650 MILLIGRAM(S): at 00:00

## 2022-09-27 RX ADMIN — Medication 650 MILLIGRAM(S): at 05:23

## 2022-09-27 RX ADMIN — LIDOCAINE 1 PATCH: 4 CREAM TOPICAL at 00:39

## 2022-09-27 RX ADMIN — Medication 1000 UNIT(S): at 12:55

## 2022-09-27 RX ADMIN — SENNA PLUS 2 TABLET(S): 8.6 TABLET ORAL at 22:13

## 2022-09-27 NOTE — CONSULT NOTE ADULT - ASSESSMENT
Patient is a 87y old  Female with PMH of dementia ,  HTN, HLD, T2DM, chronic DVT, OA, restless leg syndrome, constipation, frequent UTIs, and insomnia who has been sent in to the ER on 9/22/22 from [facility] for evaluation of altered mental status. and agitation uncharacteristic of her baseline. In ED pt was administered 2.5mg haldol and 2.5mg olanzapine for agitation. The CTH negative for acute infarct, UA negative, and CXR WNL. On day 3 of hospitalization , 9/25/22, she start having fever but no blood cultures noted in the system, also developed Right hand swelling. Today, 9/27/22, The ID consult requested to assist with further evaluation and antibiotic management.     # Fever - R/O Infectious etiology   # Right hand cellulitis vs septic arthritis vs Gout with superimposed infection   # Metabolic encephalopathy     would recommend:    1. Blood cultures X 2 before initiating Antibiotic  2. Low dose of prednisone for suspected Gout  3. Pain management as needed  4. Keep Right hand elevated  5. CT scan of Right hand to ruleout septic arthritis  6. Please start Cefazolin after drawn blood cultures    will follow the patient with you and make further recommendation based on the clinical course and Lab results  Thank you for the opportunity to participate in Ms. MARX's care    Attending Attestation:    Spent more than 65 minutes on total encounter, more than 50 % of the visit was spent counseling and/or coordinating care by the Attending physician.

## 2022-09-27 NOTE — CONSULT NOTE ADULT - SUBJECTIVE AND OBJECTIVE BOX
Patient is a 87y old  Female who presents with a chief complaint of Altered Mental Status (27 Sep 2022 10:14)        REVIEW OF SYSTEMS: Total of twelve systems have been reviewed and found to be negative unless mentioned in HPI      PAST MEDICAL & SURGICAL HISTORY:  Diabetes T2DM  Arthritis, OA  Hypertension  DVT (deep venous thrombosis)  HLD (hyperlipidemia)  Constipation  Restless leg syndrome  Insomnia  Frequent UTI  S/P knee replacement  S/P tubal ligation      SOCIAL HISTORY  Alcohol: Does not drink  Tobacco: Does not smoke  Illicit substance use: None      FAMILY HISTORY: Non contributory to the present illness      ALLERGIES: No Known Allergies      Vital Signs Last 24 Hrs  T(C): 36.7 (27 Sep 2022 07:03), Max: 38.5 (27 Sep 2022 00:00)  T(F): 98 (27 Sep 2022 07:03), Max: 101.3 (27 Sep 2022 00:00)  HR: 89 (27 Sep 2022 05:20) (76 - 89)  BP: 145/53 (27 Sep 2022 05:20) (111/38 - 150/62)  BP(mean): 74 (27 Sep 2022 05:20) (74 - 74)  RR: 17 (27 Sep 2022 05:20) (17 - 18)  SpO2: 97% (27 Sep 2022 05:20) (95% - 100%)    Parameters below as of 27 Sep 2022 05:20  Patient On (Oxygen Delivery Method): room air        PHYSICAL EXAM:  GENERAL: Not in distress   CHEST/LUNG:  Aire ntry bilaterally  HEART: s1 and s2 present  ABDOMEN:  Nontender and  Nondistended  EXTREMITIES: No pedal  edema  CNS: Awake and Alert      LABS:                        13.7   8.87  )-----------( 154      ( 27 Sep 2022 10:40 )             42.7     09-27    142  |  108  |  19<H>  ----------------------------<  178<H>  3.7   |  26  |  1.28    Ca    8.8      27 Sep 2022 10:40  Phos  2.6     09-27  Mg     1.8     09-27    TPro  7.0  /  Alb  2.2<L>  /  TBili  0.5  /  DBili  x   /  AST  21  /  ALT  25  /  AlkPhos  64  09-27    PT/INR - ( 27 Sep 2022 10:40 )   PT: 21.5 sec;   INR: 1.80 ratio          MEDICATIONS  (STANDING):  ascorbic acid 500 milliGRAM(s) Oral daily  cholecalciferol 1000 Unit(s) Oral daily  dextrose 5% + sodium chloride 0.45%. 1000 milliLiter(s) (50 mL/Hr) IV Continuous <Continuous>  lidocaine   4% Patch 1 Patch Transdermal daily  losartan 100 milliGRAM(s) Oral daily  NIFEdipine XL 30 milliGRAM(s) Oral daily  OLANZapine 2.5 milliGRAM(s) Oral at bedtime  polyethylene glycol 3350 17 Gram(s) Oral daily  senna 2 Tablet(s) Oral at bedtime  simvastatin 10 milliGRAM(s) Oral at bedtime  traZODone 50 milliGRAM(s) Oral at bedtime    MEDICATIONS  (PRN):  acetaminophen     Tablet .. 650 milliGRAM(s) Oral every 6 hours PRN Temp greater or equal to 38C (100.4F), Mild Pain (1 - 3), Moderate Pain (4 - 6)      RADIOLOGY & ADDITIONAL TESTS:    < from: CT Head No Cont (09.22.22 @ 03:50) >  No acute intracranial hemorrhage or mass effect.    < end of copied text >  < from: Xray Chest 1 View- PORTABLE-Urgent (Xray Chest 1 View- PORTABLE-Urgent .) (09.21.22 @ 22:40) >    Heart magnified by technique.    There is a slightly increased interstitial pattern in the lungs compared   to August 17, 2021. Advanced shoulder degeneration and mild right   tracheal deviation at thoracic inlet again noted.        MICROBIOLOGY DATA:    COVID-19 PCR . (09.26.22 @ 12:27)   COVID-19 PCR: Amisha             Patient is a 87y old  Female with PMH of dementia ,  HTN, HLD, T2DM, chronic DVT, OA, restless leg syndrome, constipation, frequent UTIs, and insomnia who has been sent in to the ER on 9/22/22 from [facility] for evaluation of altered mental status. and agitation uncharacteristic of her baseline. In ED pt was administered 2.5mg haldol and 2.5mg olanzapine for agitation. The CTH negative for acute infarct, UA negative, and CXR WNL. On day 3 of hospitalization , 9/25/22, she start having fever but no blood cultures noted in the system, also developed Right hand swelling. Today, 9/27/22, The ID consult requested to assist with further evaluation and antibiotic management.       REVIEW OF SYSTEMS: Total of twelve systems have been reviewed and found to be negative unless mentioned in HPI      PAST MEDICAL & SURGICAL HISTORY:  Diabetes T2DM  Arthritis, OA  Hypertension  DVT (deep venous thrombosis)  HLD (hyperlipidemia)  Constipation  Restless leg syndrome  Insomnia  Frequent UTI  S/P knee replacement  S/P tubal ligation      SOCIAL HISTORY  Alcohol: Does not drink  Tobacco: Does not smoke  Illicit substance use: None      FAMILY HISTORY: Non contributory to the present illness      ALLERGIES: No Known Allergies      Vital Signs Last 24 Hrs  T(C): 36.7 (27 Sep 2022 07:03), Max: 38.5 (27 Sep 2022 00:00)  T(F): 98 (27 Sep 2022 07:03), Max: 101.3 (27 Sep 2022 00:00)  HR: 89 (27 Sep 2022 05:20) (76 - 89)  BP: 145/53 (27 Sep 2022 05:20) (111/38 - 150/62)  BP(mean): 74 (27 Sep 2022 05:20) (74 - 74)  RR: 17 (27 Sep 2022 05:20) (17 - 18)  SpO2: 97% (27 Sep 2022 05:20) (95% - 100%)    Parameters below as of 27 Sep 2022 05:20  Patient On (Oxygen Delivery Method): room air        PHYSICAL EXAM:  GENERAL: Not in distress   CHEST/LUNG:  Not using accessory muscles   HEART: s1 and s2 present  ABDOMEN:  Nontender and  Nondistended  EXTREMITIES: Right hand erythematous, very tender, swollen and warmth to touch   CNS: Awake and Alert      LABS:                        13.7   8.87  )-----------( 154      ( 27 Sep 2022 10:40 )             42.7       09-27    142  |  108  |  19<H>  ----------------------------<  178<H>  3.7   |  26  |  1.28    Ca    8.8      27 Sep 2022 10:40  Phos  2.6     09-27  Mg     1.8     09-27    TPro  7.0  /  Alb  2.2<L>  /  TBili  0.5  /  DBili  x   /  AST  21  /  ALT  25  /  AlkPhos  64  09-27    PT/INR - ( 27 Sep 2022 10:40 )   PT: 21.5 sec;   INR: 1.80 ratio          MEDICATIONS  (STANDING):  ascorbic acid 500 milliGRAM(s) Oral daily  cholecalciferol 1000 Unit(s) Oral daily  dextrose 5% + sodium chloride 0.45%. 1000 milliLiter(s) (50 mL/Hr) IV Continuous <Continuous>  lidocaine   4% Patch 1 Patch Transdermal daily  losartan 100 milliGRAM(s) Oral daily  NIFEdipine XL 30 milliGRAM(s) Oral daily  OLANZapine 2.5 milliGRAM(s) Oral at bedtime  polyethylene glycol 3350 17 Gram(s) Oral daily  senna 2 Tablet(s) Oral at bedtime  simvastatin 10 milliGRAM(s) Oral at bedtime  traZODone 50 milliGRAM(s) Oral at bedtime    MEDICATIONS  (PRN):  acetaminophen     Tablet .. 650 milliGRAM(s) Oral every 6 hours PRN Temp greater or equal to 38C (100.4F), Mild Pain (1 - 3), Moderate Pain (4 - 6)      RADIOLOGY & ADDITIONAL TESTS:    9/22/22: CT Head No Cont (09.22.22 @ 03:50) No acute intracranial hemorrhage or mass effect.      9/21/22: Xray Chest 1 View- PORTABLE-Urgent (Xray Chest 1 View- PORTABLE-Urgent .) (09.21.22 @ 22:40) Heart magnified by technique.    There is a slightly increased interstitial pattern in the lungs compared to August 17, 2021. Advanced shoulder degeneration and mild right   tracheal deviation at thoracic inlet again noted.        MICROBIOLOGY DATA:    COVID-19 PCR . (09.26.22 @ 12:27)   COVID-19 PCR: NotDetec

## 2022-09-27 NOTE — BH CONSULTATION LIAISON PROGRESS NOTE - NSBHMSERECMEM_PSY_A_CORE
Patient verified name, , and procedure. Type: 1a; abbreviated assessment per anesthesia guidelines    Labs per anesthesia: K+    Instructed pt that they will be notified the day before their procedure by the GI Lab for time [facility] for [place] cases. Arrival times should be called by 5 pm. If no phone is received the patient should contact their respective hospital. The GI lab telephone number is [phone] and ES Pre-op is [phone]. Follow diet and prep instructions per office including NPO status. If patient has NOT received instructions from office patient is advised to call surgeon office, verbalizes understanding. Bath or shower the night before and the am of surgery with non-mositurizing soap. No lotions, oils, powders, cologne on skin. No make up, eye make up or jewelry. Wear loose fitting comfortable, clean clothing. Must have adult present in building the entire time . Medications for the day of procedure Carvedilol (Coreg) and Escitalopram oxalate (Brendolyn Lobe), patient to hold apixaban (Eliquis) per surgeon's instructions. The following discharge instructions reviewed with patient: medication given during procedure may cause drowsiness for several hours, therefore, do not drive or operate machinery for remainder of the day. You may not drink alcohol on the day of your procedure, please resume regular diet and activity unless otherwise directed. You may experience abdominal distention for several hours that is relieved by the passage of gas. Contact your physician if you have any of the following: fever or chills, severe abdominal pain or excessive amount of bleeding or a large amount when having a bowel movement.  Occasional specks of blood with bowel movement would not be unusual. Impaired

## 2022-09-27 NOTE — PROGRESS NOTE ADULT - PROBLEM SELECTOR PLAN 1
- B/l Hand swelling reported and noted  - B/l Hand xray pending-f/u results   - Unknown etiology  - ESR wnl for age  - Uric acid wnl    - JUVENTINO pending-f/u results  - ID-Dr. Pereyra consult pending-f/u recommendations - B/l Hand swelling reported and noted  - B/l Hand xray pending-f/u results   - Unknown etiology  - ESR above wnl, elevated   - Uric acid wnl    - JUVENTINO pending-f/u results  - ID-Dr. Pereyra consult pending-f/u recommendations  - B/l hand xray pending-f/u results   - CT Right hand pending-f/u results

## 2022-09-27 NOTE — BH CONSULTATION LIAISON PROGRESS NOTE - NSBHASSESSMENTFT_PSY_ALL_CORE
This is a 87 , retired female, non-caregiver, domiciled at [place] at [facility], with no known formal past psychiatric illness history, and past medical history of dementia (alert and disoriented at baseline per chart), HTN, HLD, Type II DM, chronic DVT, osteoarthritis, restless leg syndrome, constipation, frequent UTIs and insomnia who presented on 9/21 with altered mental status including agitation not consistent with her baseline, found to be hypernatremic (Na 152) with acute kidney injury (Cr 1.32). Psychiatry consulted for patient being uncooperative with all aspects of her care including refusing PO intake. Assessment and follow up today remain consistent with delirium (evident disorientation w/ fluctuations in degree of alertness / engagement and orientation) superimposed on underlying dementia. In addition to optimizing medical care, would also recommend environmental delirium provisions as outlined below. Given encephalopathy, it would be impossible to opine on the presence or absence of a superimposed depressive disorder at this time, though history of pre-illness functioning would suggest the absence of a major depressive episode (no reported anhedonia or dysphoria preceding AMS and no neurovegetative sx of depression). There are currently no acute indicators for ongoing psychiatric involvement at this time.

## 2022-09-27 NOTE — BH CONSULTATION LIAISON PROGRESS NOTE - NSBHCHARTREVIEWVS_PSY_A_CORE FT
Vital Signs Last 24 Hrs  T(C): 36.7 (27 Sep 2022 07:03), Max: 38.5 (27 Sep 2022 00:00)  T(F): 98 (27 Sep 2022 07:03), Max: 101.3 (27 Sep 2022 00:00)  HR: 89 (27 Sep 2022 05:20) (76 - 89)  BP: 145/53 (27 Sep 2022 05:20) (111/38 - 150/62)  BP(mean): 74 (27 Sep 2022 05:20) (74 - 74)  RR: 17 (27 Sep 2022 05:20) (17 - 18)  SpO2: 97% (27 Sep 2022 05:20) (95% - 100%)    Parameters below as of 27 Sep 2022 05:20  Patient On (Oxygen Delivery Method): room air

## 2022-09-27 NOTE — BH CONSULTATION LIAISON PROGRESS NOTE - NSBHFUPINTERVALHXFT_PSY_A_CORE
On assessment, patient relays that she is "not too good." She is unable to verbalize why other than to reiterate "not too good" but on ROS, she affirms being in pain stating that her "arms" hurt and "my leg hurts a little." She relays having arthritis but also tells me "I fell last Tuesday." She is not oriented, however, relaying that "today's Saturday" but correctly relays the month and year. She denies other physical discomforts specifically denying HA, dyspnea, CP, nausea, abdominal pain and bowel/bladder discomfort. She tells me "I was at the nursing home" when she fell but provides no additional details. She reports usually taking Tylenol when in pain, but when offered some, she relays "I don't have no pain right now." On limited psychiatric history, she denies ever previously seeing a psychiatrist and denies feelings of depression, sadness, anxiety or irritability. She describes her sleep last night as "pretty good after they gave me a sleeping pill." She denies SI or history of suicidality. She denies paranoia specifically expressing feeling safe in the hospital and denying having reasons to feel otherwise. She conveys having two children, "my son and daughter," Hari and Daisy and consents to speaking with them if needed.

## 2022-09-27 NOTE — PROGRESS NOTE ADULT - PROBLEM SELECTOR PLAN 5
- Home med Coumadin 2mg QHS   - INR 2.22 upon admission.  INR 1.8 today    - C/w Coumadin  - Continue to monitor INR daily.  INR (Goal 2-3).  INR in AM-f/u results

## 2022-09-27 NOTE — PROGRESS NOTE ADULT - ASSESSMENT
87 yr old female, from NH, H/O dementia/HTN/HLd/.DM/DVT/OA shoulder, admit hospital for AMS/agitation/combative at NH refused eating refused meds, refused care, CT head no acute finding, COVID-19 swab negative , CHARI/hypernatremia, start IV hydration, F/U urine culture  negative ,  CXR no infiltrate, psych consult, fall precaution. DX AMS/agitation/behavior/metabolic encephalopathy/CHARI/hypernatremia. Still combative refused eat pulled IV line, refused meds refused care, Zyprexa IM prn for agitation, zyprexa 2.5 mg po HS, more calm, fall precaution,  COVID-19 negative. Advance care discussed, full code. Still  spiked fever  101F, pan culture, right hand/finger swelling/warm on touch,  septic vs inflammatory arthritis(?), pan culture X ray of right hand, ESR/JUVENTINO/CRP,  ID consult, tylenol prn for fever

## 2022-09-27 NOTE — BH CONSULTATION LIAISON PROGRESS NOTE - NSBHCONSULTRECOMMENDOTHER_PSY_A_CORE FT
- Treat all known contributors to altered mental state as you are  - Avoid use of anticholinergic, benzodiazepine and opioid medications as these can worsen mental status  	-when opioids are absolutely needed, preference for low dose hydromorphone or oxycodone     Environmental Provisions:   - Maintain daytime wakefulness / night-time sleep              - Windows open during the day and mobilize as tolerated during the day;                 - Low lighting and noise avoidance to promote sleep  - Ensure access to any sensory aides used prior to admission (i.e. glasses, hearing aids etc)    Disposition:   - Optimize patient's cognition prior to finalizing disposition options  - PT/OT to determine appropriate discharge level of care with CM to facilitate placement or home care needs  - There are NO psychiatric contraindications to discharge when patient is medically cleared.

## 2022-09-27 NOTE — BH CONSULTATION LIAISON PROGRESS NOTE - CURRENT MEDICATION
MEDICATIONS  (STANDING):  ascorbic acid 500 milliGRAM(s) Oral daily  cholecalciferol 1000 Unit(s) Oral daily  dextrose 5% + sodium chloride 0.45%. 1000 milliLiter(s) (50 mL/Hr) IV Continuous <Continuous>  lidocaine   4% Patch 1 Patch Transdermal daily  losartan 100 milliGRAM(s) Oral daily  NIFEdipine XL 30 milliGRAM(s) Oral daily  OLANZapine 2.5 milliGRAM(s) Oral at bedtime  polyethylene glycol 3350 17 Gram(s) Oral daily  senna 2 Tablet(s) Oral at bedtime  simvastatin 10 milliGRAM(s) Oral at bedtime  traZODone 50 milliGRAM(s) Oral at bedtime    MEDICATIONS  (PRN):  acetaminophen     Tablet .. 650 milliGRAM(s) Oral every 6 hours PRN Temp greater or equal to 38C (100.4F), Mild Pain (1 - 3), Moderate Pain (4 - 6)

## 2022-09-27 NOTE — PROGRESS NOTE ADULT - SUBJECTIVE AND OBJECTIVE BOX
Patient is a 87y old  Female who presents with a chief complaint of Altered Mental Status (26 Sep 2022 12:36)/metabolic encephalopathy/fever 101F, pan culture , ID consult, right hand swelling redness      INTERVAL HPI/OVERNIGHT EVENTS:  T(C): 36.7 (09-27-22 @ 07:03), Max: 38.5 (09-27-22 @ 00:00)  HR: 89 (09-27-22 @ 05:20) (76 - 89)  BP: 145/53 (09-27-22 @ 05:20) (111/38 - 150/62)  RR: 17 (09-27-22 @ 05:20) (17 - 18)  SpO2: 97% (09-27-22 @ 05:20) (95% - 100%)  Wt(kg): --    LABS:                        13.4   8.56  )-----------( 148      ( 26 Sep 2022 06:52 )             42.5     09-26    142  |  109<H>  |  15  ----------------------------<  138<H>  3.5   |  27  |  1.00    Ca    8.5      26 Sep 2022 06:52      PT/INR - ( 26 Sep 2022 15:16 )   PT: 25.9 sec;   INR: 2.16 ratio             CAPILLARY BLOOD GLUCOSE            RADIOLOGY & ADDITIONAL TESTS:    Consultant(s) Notes Reviewed:  [x ] YES  [ ] NO    PHYSICAL EXAM:  GENERAL: well built, well nourished  HEAD:  Atraumatic, Normocephalic  EYES: EOMI, PERRLA, conjunctiva and sclera clear  ENT: No tonsillar erythema, exudates, or enlargement; Moist mucous membranes, Good dentition, No lesions  NECK: Supple, No JVD, Normal thyroid, no enlarged nodes  NERVOUS SYSTEM:  more calm  CHEST/LUNG: B/L good air entry; No rales, rhonchi, or wheezing  HEART: S1S2 normal, no S3, Regular rate and rhythm; No murmurs, rubs, or gallops  ABDOMEN: Soft, Nontender, Nondistended; Bowel sounds present  EXTREMITIES:  2+ Peripheral Pulses, No clubbing, cyanosis, right hand/finger swelling warm on touch  LYMPH: No lymphadenopathy noted  SKIN: as above    Care Discussed with Consultants/Other Providers [ x] YES  [ ] NO

## 2022-09-27 NOTE — PROGRESS NOTE ADULT - PROBLEM SELECTOR PLAN 2
- p/w agitation, A&Ox0 and not following commands  - baseline alert but disoriented, follows simple commands  - CTH negative  - CXR negative   - UA, CXR negative  - TSH wnl  - C/w supportive care   - Folate wnl   - Vitamin B12 elevated ? supplementation vs. renal vs. hepatic    - C/w Zyprexa 2.5mg QHS.  Added Zyprexa 2.5mg PO Q8h PRN for anxiety/agitation   - Neuro-Dr. Olea consulted appreciated  - Telepsych-Dr. Vasquez consulted, appreciated

## 2022-09-27 NOTE — BH CONSULTATION LIAISON PROGRESS NOTE - NSBHCHARTREVIEWLAB_PSY_A_CORE FT
Sedimentation Rate, Erythrocyte: 73 mm/Hr (09.27.22 @ 10:40)    09-27    142  |  108  |  19<H>  ----------------------------<  178<H>  3.7   |  26  |  1.28    Ca    8.8      27 Sep 2022 10:40  Phos  2.6     09-27  Mg     1.8     09-27    TPro  7.0  /  Alb  2.2<L>  /  TBili  0.5  /  DBili  x   /  AST  21  /  ALT  25  /  AlkPhos  64  09-27    Complete Blood Count + Automated Diff (09.27.22 @ 10:40)    WBC Count: 8.87 K/uL    RBC Count: 4.56 M/uL    Hemoglobin: 13.7 g/dL    Hematocrit: 42.7 %    Mean Cell Volume: 93.6 fl    Mean Cell Hemoglobin: 30.0 pg    Mean Cell Hemoglobin Conc: 32.1 gm/dL    Red Cell Distrib Width: 12.9 %    Platelet Count - Automated: 154 K/uL    Auto Neutrophil #: 6.62 K/uL    Auto Lymphocyte #: 1.00 K/uL    Auto Monocyte #: 1.16 K/uL    Auto Eosinophil #: 0.02 K/uL    Auto Basophil #: 0.03 K/uL    Auto Neutrophil %: 74.6: Differential percentages must be correlated with absolute numbers for  clinical significance. %    Auto Lymphocyte %: 11.3 %    Auto Monocyte %: 13.1 %    Auto Eosinophil %: 0.2 %    Auto Basophil %: 0.3 %    Auto Immature Granulocyte %: 0.5: (Includes meta, myelo and promyelocytes). Mild elevations in immature  granulocytes may be seen with many inflammatory processes and pregnancy;  clinical correlation suggested. %    Nucleated RBC: 0 /100 WBCs

## 2022-09-27 NOTE — PROGRESS NOTE ADULT - SUBJECTIVE AND OBJECTIVE BOX
NP Note discussed with  primary attending    Patient is a 87y old  Female who presents with a chief complaint of Altered Mental Status (27 Sep 2022 11:58)      INTERVAL HPI/OVERNIGHT EVENTS: After NP greeted     MEDICATIONS  (STANDING):  ascorbic acid 500 milliGRAM(s) Oral daily  cholecalciferol 1000 Unit(s) Oral daily  dextrose 5% + sodium chloride 0.45%. 1000 milliLiter(s) (50 mL/Hr) IV Continuous <Continuous>  lidocaine   4% Patch 1 Patch Transdermal daily  losartan 100 milliGRAM(s) Oral daily  NIFEdipine XL 30 milliGRAM(s) Oral daily  OLANZapine 2.5 milliGRAM(s) Oral at bedtime  polyethylene glycol 3350 17 Gram(s) Oral daily  senna 2 Tablet(s) Oral at bedtime  simvastatin 10 milliGRAM(s) Oral at bedtime  traZODone 50 milliGRAM(s) Oral at bedtime  warfarin 2 milliGRAM(s) Oral once    MEDICATIONS  (PRN):  acetaminophen     Tablet .. 650 milliGRAM(s) Oral every 6 hours PRN Temp greater or equal to 38C (100.4F), Mild Pain (1 - 3), Moderate Pain (4 - 6)  OLANZapine 2.5 milliGRAM(s) Oral every 8 hours PRN Anxiety/agitation      __________________________________________________  REVIEW OF SYSTEMS:    CONSTITUTIONAL: No fever  EYES: No acute visual disturbances  NECK: No pain or stiffness  RESPIRATORY: No cough; No shortness of breath  CARDIOVASCULAR: No chest pain, no palpitations  GASTROINTESTINAL: No pain. No nausea or vomiting.  No diarrhea   NEUROLOGICAL: No headache or numbness, no tremors  MUSCULOSKELETAL: No joint pain, no muscle pain  GENITOURINARY: No dysuria, no frequency, no hesitancy  PSYCHIATRY: No depression , no anxiety  ALL OTHER  ROS negative        Vital Signs Last 24 Hrs  T(C): 37.2 (27 Sep 2022 14:11), Max: 38.5 (27 Sep 2022 00:00)  T(F): 99 (27 Sep 2022 14:11), Max: 101.3 (27 Sep 2022 00:00)  HR: 84 (27 Sep 2022 14:11) (84 - 89)  BP: 125/50 (27 Sep 2022 14:11) (125/50 - 150/62)  BP(mean): 74 (27 Sep 2022 05:20) (74 - 74)  RR: 17 (27 Sep 2022 14:11) (17 - 18)  SpO2: 97% (27 Sep 2022 14:11) (95% - 97%)    Parameters below as of 27 Sep 2022 14:11  Patient On (Oxygen Delivery Method): room air        ________________________________________________  PHYSICAL EXAM:  GENERAL: NAD  HEENT: Normocephalic;  conjunctivae and sclerae clear; moist mucous membranes;   NECK : supple  CHEST/LUNG: Clear to auscultation bilaterally with good air entry   HEART: S1 S2  regular; no murmurs, gallops or rubs  ABDOMEN: Soft, Nontender, Nondistended; Bowel sounds present x 4 quad  EXTREMITIES: No cyanosis; no edema; no calf tenderness  SKIN: Warm and dry; no rash  NERVOUS SYSTEM:  Awake and alert; Oriented  to place, person and time ; no new deficits    _________________________________________________  LABS:                        13.7   8.87  )-----------( 154      ( 27 Sep 2022 10:40 )             42.7     09-27    142  |  108  |  19<H>  ----------------------------<  178<H>  3.7   |  26  |  1.28    Ca    8.8      27 Sep 2022 10:40  Phos  2.6     09-27  Mg     1.8     09-27    TPro  7.0  /  Alb  2.2<L>  /  TBili  0.5  /  DBili  x   /  AST  21  /  ALT  25  /  AlkPhos  64  09-27    PT/INR - ( 27 Sep 2022 10:40 )   PT: 21.5 sec;   INR: 1.80 ratio             CAPILLARY BLOOD GLUCOSE            RADIOLOGY & ADDITIONAL TESTS:    Imaging Personally Reviewed:  YES/NO    Consultant(s) Notes Reviewed:   YES/ No    Care Discussed with Consultants :     Plan of care was discussed with patient and /or primary care giver; all questions and concerns were addressed and care was aligned with patient's wishes.     INCOMPLETE +++++++++++++++++++++++++++++++++++++    NP Note discussed with  primary attending    Patient is a 87y old  Female who presents with a chief complaint of Altered Mental Status (27 Sep 2022 11:58)      INTERVAL HPI/OVERNIGHT EVENTS: After NP greeted pt, pt stated, " I didn't have any breakfast.  NP informed pt that NP would inform the nurse.  Pt verbalized understanding.  Pt reported b/l hand pain but unable to state for how long she has had b/l hand pain.      MEDICATIONS  (STANDING):  ascorbic acid 500 milliGRAM(s) Oral daily  cholecalciferol 1000 Unit(s) Oral daily  dextrose 5% + sodium chloride 0.45%. 1000 milliLiter(s) (50 mL/Hr) IV Continuous <Continuous>  lidocaine   4% Patch 1 Patch Transdermal daily  losartan 100 milliGRAM(s) Oral daily  NIFEdipine XL 30 milliGRAM(s) Oral daily  OLANZapine 2.5 milliGRAM(s) Oral at bedtime  polyethylene glycol 3350 17 Gram(s) Oral daily  senna 2 Tablet(s) Oral at bedtime  simvastatin 10 milliGRAM(s) Oral at bedtime  traZODone 50 milliGRAM(s) Oral at bedtime  warfarin 2 milliGRAM(s) Oral once    MEDICATIONS  (PRN):  acetaminophen     Tablet .. 650 milliGRAM(s) Oral every 6 hours PRN Temp greater or equal to 38C (100.4F), Mild Pain (1 - 3), Moderate Pain (4 - 6)  OLANZapine 2.5 milliGRAM(s) Oral every 8 hours PRN Anxiety/agitation      __________________________________________________  REVIEW OF SYSTEMS:  Limited in demented pt     CONSTITUTIONAL: No fever  EYES: No acute visual disturbances  NECK: No pain or stiffness  RESPIRATORY: No cough; No shortness of breath  CARDIOVASCULAR: No chest pain, no palpitations  GASTROINTESTINAL: No pain. No nausea or vomiting.  No diarrhea   NEUROLOGICAL: No headache or numbness, no tremors  MUSCULOSKELETAL: Reports bilateral hand pain, no muscle pain  GENITOURINARY: No dysuria, no frequency, no hesitancy  PSYCHIATRY: No depression , no anxiety  ALL OTHER  ROS negative      Vital Signs Last 24 Hrs  T(C): 37.2 (27 Sep 2022 14:11), Max: 38.5 (27 Sep 2022 00:00)  T(F): 99 (27 Sep 2022 14:11), Max: 101.3 (27 Sep 2022 00:00)  HR: 84 (27 Sep 2022 14:11) (84 - 89)  BP: 125/50 (27 Sep 2022 14:11) (125/50 - 150/62)  BP(mean): 74 (27 Sep 2022 05:20) (74 - 74)  RR: 17 (27 Sep 2022 14:11) (17 - 18)  SpO2: 97% (27 Sep 2022 14:11) (95% - 97%)    Parameters below as of 27 Sep 2022 14:11  Patient On (Oxygen Delivery Method): room air        ________________________________________________  PHYSICAL EXAM:  GENERAL: NAD  HEENT: Normocephalic;  conjunctivae and sclerae clear; moist mucous membranes;   NECK : Supple  CHEST/LUNG: Clear to auscultation bilaterally with good air entry   HEART: S1 S2  regular; no murmurs, gallops or rubs  ABDOMEN: Soft, Nontender, Nondistended; Bowel sounds present x 4 quad.   EXTREMITIES: No cyanosis; (+) B/l hand swelling edema; no calf tenderness  SKIN: Warm and dry; no rash  NERVOUS SYSTEM:  Awake and alert; Oriented to place and person, not time; no new deficits  _________________________________________________  LABS:                        13.7   8.87  )-----------( 154      ( 27 Sep 2022 10:40 )             42.7     09-27    142  |  108  |  19<H>  ----------------------------<  178<H>  3.7   |  26  |  1.28    Ca    8.8      27 Sep 2022 10:40  Phos  2.6     09-27  Mg     1.8     09-27    TPro  7.0  /  Alb  2.2<L>  /  TBili  0.5  /  DBili  x   /  AST  21  /  ALT  25  /  AlkPhos  64  09-27    PT/INR - ( 27 Sep 2022 10:40 )   PT: 21.5 sec;   INR: 1.80 ratio             CAPILLARY BLOOD GLUCOSE            RADIOLOGY & ADDITIONAL TESTS:    Imaging Personally Reviewed:  YES    Consultant(s) Notes Reviewed:   YES    Care Discussed with Consultants :     Plan of care was discussed with patient and /or primary care giver; all questions and concerns were addressed and care was aligned with patient's wishes.     NP Note discussed with  primary attending    Patient is a 87y old  Female who presents with a chief complaint of Altered Mental Status (27 Sep 2022 11:58)      INTERVAL HPI/OVERNIGHT EVENTS: After NP greeted pt, pt stated, " I didn't have any breakfast."  NP informed pt that NP would inform the nurse.  Pt verbalized understanding.  Pt reported b/l hand pain but unable to state for how long she has had b/l hand pain.  As NP was leaving room, pt stated, "can I ask you something, I had two COVID tests what were they?"  NP informed pt both tests were negative.  Pt verbalized "oh good."  Pt then asked, "can I see my kids, I wanna see my kids."  NP verbalized understanding and apologized that visitation was not possible at this time d/t unit being closed for COVID.  Pt verbalized understanding and repeated, "I wanna see my kids."         MEDICATIONS  (STANDING):  ascorbic acid 500 milliGRAM(s) Oral daily  cholecalciferol 1000 Unit(s) Oral daily  dextrose 5% + sodium chloride 0.45%. 1000 milliLiter(s) (50 mL/Hr) IV Continuous <Continuous>  lidocaine   4% Patch 1 Patch Transdermal daily  losartan 100 milliGRAM(s) Oral daily  NIFEdipine XL 30 milliGRAM(s) Oral daily  OLANZapine 2.5 milliGRAM(s) Oral at bedtime  polyethylene glycol 3350 17 Gram(s) Oral daily  senna 2 Tablet(s) Oral at bedtime  simvastatin 10 milliGRAM(s) Oral at bedtime  traZODone 50 milliGRAM(s) Oral at bedtime  warfarin 2 milliGRAM(s) Oral once    MEDICATIONS  (PRN):  acetaminophen     Tablet .. 650 milliGRAM(s) Oral every 6 hours PRN Temp greater or equal to 38C (100.4F), Mild Pain (1 - 3), Moderate Pain (4 - 6)  OLANZapine 2.5 milliGRAM(s) Oral every 8 hours PRN Anxiety/agitation      __________________________________________________  REVIEW OF SYSTEMS:  Limited in demented pt     CONSTITUTIONAL: No fever  EYES: No acute visual disturbances  NECK: No pain or stiffness  RESPIRATORY: No cough; No shortness of breath  CARDIOVASCULAR: No chest pain, no palpitations  GASTROINTESTINAL: No pain. No nausea or vomiting.  No diarrhea   NEUROLOGICAL: No headache or numbness, no tremors  MUSCULOSKELETAL: Reports bilateral hand pain, no muscle pain  GENITOURINARY: No dysuria, no frequency, no hesitancy  PSYCHIATRY: No depression , no anxiety  ALL OTHER  ROS negative      Vital Signs Last 24 Hrs  T(C): 37.2 (27 Sep 2022 14:11), Max: 38.5 (27 Sep 2022 00:00)  T(F): 99 (27 Sep 2022 14:11), Max: 101.3 (27 Sep 2022 00:00)  HR: 84 (27 Sep 2022 14:11) (84 - 89)  BP: 125/50 (27 Sep 2022 14:11) (125/50 - 150/62)  BP(mean): 74 (27 Sep 2022 05:20) (74 - 74)  RR: 17 (27 Sep 2022 14:11) (17 - 18)  SpO2: 97% (27 Sep 2022 14:11) (95% - 97%)    Parameters below as of 27 Sep 2022 14:11  Patient On (Oxygen Delivery Method): room air        ________________________________________________  PHYSICAL EXAM:  GENERAL: NAD  HEENT: Normocephalic;  conjunctivae and sclerae clear; moist mucous membranes.    NECK : Supple  CHEST/LUNG: Clear to auscultation bilaterally with good air entry   HEART: S1 S2  regular; no murmurs, gallops or rubs  ABDOMEN: Soft, Nontender, Nondistended; Bowel sounds present x 4 quad.   EXTREMITIES: No cyanosis; (+) B/l hand swelling edema; no calf tenderness  SKIN: Warm and dry; no rash  NERVOUS SYSTEM:  Awake and alert; Oriented to place and person, not time; no new deficits  _________________________________________________  LABS:                        13.7   8.87  )-----------( 154      ( 27 Sep 2022 10:40 )             42.7     09-27    142  |  108  |  19<H>  ----------------------------<  178<H>  3.7   |  26  |  1.28    Ca    8.8      27 Sep 2022 10:40  Phos  2.6     09-27  Mg     1.8     09-27    TPro  7.0  /  Alb  2.2<L>  /  TBili  0.5  /  DBili  x   /  AST  21  /  ALT  25  /  AlkPhos  64  09-27    PT/INR - ( 27 Sep 2022 10:40 )   PT: 21.5 sec;   INR: 1.80 ratio             CAPILLARY BLOOD GLUCOSE            RADIOLOGY & ADDITIONAL TESTS:    Imaging Personally Reviewed:  YES    Consultant(s) Notes Reviewed:   YES    Care Discussed with Consultants :     Plan of care was discussed with patient and /or primary care giver; all questions and concerns were addressed and care was aligned with patient's wishes.

## 2022-09-27 NOTE — BH CONSULTATION LIAISON PROGRESS NOTE - NSBHINDICATION_PSY_ALL_CORE
Observation should be determined by primary team based on degree of agitation and safety concern; if escalating agitation then change to constant observation; if improved agitation without safety risk then change to routine observation

## 2022-09-28 DIAGNOSIS — Z02.9 ENCOUNTER FOR ADMINISTRATIVE EXAMINATIONS, UNSPECIFIED: ICD-10-CM

## 2022-09-28 LAB
ANA TITR SER: NEGATIVE — SIGNIFICANT CHANGE UP
ANION GAP SERPL CALC-SCNC: 8 MMOL/L — SIGNIFICANT CHANGE UP (ref 5–17)
BUN SERPL-MCNC: 26 MG/DL — HIGH (ref 7–18)
CALCIUM SERPL-MCNC: 9.1 MG/DL — SIGNIFICANT CHANGE UP (ref 8.4–10.5)
CHLORIDE SERPL-SCNC: 108 MMOL/L — SIGNIFICANT CHANGE UP (ref 96–108)
CO2 SERPL-SCNC: 25 MMOL/L — SIGNIFICANT CHANGE UP (ref 22–31)
CREAT SERPL-MCNC: 1.3 MG/DL — SIGNIFICANT CHANGE UP (ref 0.5–1.3)
EGFR: 40 ML/MIN/1.73M2 — LOW
GLUCOSE BLDC GLUCOMTR-MCNC: 124 MG/DL — HIGH (ref 70–99)
GLUCOSE BLDC GLUCOMTR-MCNC: 142 MG/DL — HIGH (ref 70–99)
GLUCOSE BLDC GLUCOMTR-MCNC: 197 MG/DL — HIGH (ref 70–99)
GLUCOSE SERPL-MCNC: 163 MG/DL — HIGH (ref 70–99)
HCT VFR BLD CALC: 42 % — SIGNIFICANT CHANGE UP (ref 34.5–45)
HGB BLD-MCNC: 13.3 G/DL — SIGNIFICANT CHANGE UP (ref 11.5–15.5)
INR BLD: 1.43 RATIO — HIGH (ref 0.88–1.16)
MCHC RBC-ENTMCNC: 30 PG — SIGNIFICANT CHANGE UP (ref 27–34)
MCHC RBC-ENTMCNC: 31.7 GM/DL — LOW (ref 32–36)
MCV RBC AUTO: 94.6 FL — SIGNIFICANT CHANGE UP (ref 80–100)
NRBC # BLD: 0 /100 WBCS — SIGNIFICANT CHANGE UP (ref 0–0)
PLATELET # BLD AUTO: 149 K/UL — LOW (ref 150–400)
POTASSIUM SERPL-MCNC: 4.1 MMOL/L — SIGNIFICANT CHANGE UP (ref 3.5–5.3)
POTASSIUM SERPL-SCNC: 4.1 MMOL/L — SIGNIFICANT CHANGE UP (ref 3.5–5.3)
PROTHROM AB SERPL-ACNC: 17.1 SEC — HIGH (ref 10.5–13.4)
RBC # BLD: 4.44 M/UL — SIGNIFICANT CHANGE UP (ref 3.8–5.2)
RBC # FLD: 13 % — SIGNIFICANT CHANGE UP (ref 10.3–14.5)
SODIUM SERPL-SCNC: 141 MMOL/L — SIGNIFICANT CHANGE UP (ref 135–145)
WBC # BLD: 8.72 K/UL — SIGNIFICANT CHANGE UP (ref 3.8–10.5)
WBC # FLD AUTO: 8.72 K/UL — SIGNIFICANT CHANGE UP (ref 3.8–10.5)

## 2022-09-28 RX ORDER — DEXTROSE 50 % IN WATER 50 %
12.5 SYRINGE (ML) INTRAVENOUS ONCE
Refills: 0 | Status: DISCONTINUED | OUTPATIENT
Start: 2022-09-28 | End: 2022-10-03

## 2022-09-28 RX ORDER — CEFAZOLIN SODIUM 1 G
2000 VIAL (EA) INJECTION EVERY 8 HOURS
Refills: 0 | Status: DISCONTINUED | OUTPATIENT
Start: 2022-09-28 | End: 2022-10-03

## 2022-09-28 RX ORDER — CEFAZOLIN SODIUM 1 G
2000 VIAL (EA) INJECTION EVERY 8 HOURS
Refills: 0 | Status: DISCONTINUED | OUTPATIENT
Start: 2022-09-28 | End: 2022-09-28

## 2022-09-28 RX ORDER — SODIUM CHLORIDE 9 MG/ML
1000 INJECTION, SOLUTION INTRAVENOUS
Refills: 0 | Status: DISCONTINUED | OUTPATIENT
Start: 2022-09-28 | End: 2022-10-03

## 2022-09-28 RX ORDER — WARFARIN SODIUM 2.5 MG/1
3 TABLET ORAL ONCE
Refills: 0 | Status: COMPLETED | OUTPATIENT
Start: 2022-09-28 | End: 2022-09-28

## 2022-09-28 RX ORDER — CEFAZOLIN SODIUM 1 G
1000 VIAL (EA) INJECTION EVERY 8 HOURS
Refills: 0 | Status: DISCONTINUED | OUTPATIENT
Start: 2022-09-28 | End: 2022-09-28

## 2022-09-28 RX ORDER — SODIUM CHLORIDE 9 MG/ML
1000 INJECTION INTRAMUSCULAR; INTRAVENOUS; SUBCUTANEOUS
Refills: 0 | Status: DISCONTINUED | OUTPATIENT
Start: 2022-09-28 | End: 2022-10-03

## 2022-09-28 RX ORDER — GLUCAGON INJECTION, SOLUTION 0.5 MG/.1ML
1 INJECTION, SOLUTION SUBCUTANEOUS ONCE
Refills: 0 | Status: DISCONTINUED | OUTPATIENT
Start: 2022-09-28 | End: 2022-10-03

## 2022-09-28 RX ORDER — DEXTROSE 50 % IN WATER 50 %
15 SYRINGE (ML) INTRAVENOUS ONCE
Refills: 0 | Status: DISCONTINUED | OUTPATIENT
Start: 2022-09-28 | End: 2022-10-03

## 2022-09-28 RX ORDER — INSULIN LISPRO 100/ML
VIAL (ML) SUBCUTANEOUS
Refills: 0 | Status: DISCONTINUED | OUTPATIENT
Start: 2022-09-28 | End: 2022-10-03

## 2022-09-28 RX ORDER — DEXTROSE 50 % IN WATER 50 %
25 SYRINGE (ML) INTRAVENOUS ONCE
Refills: 0 | Status: DISCONTINUED | OUTPATIENT
Start: 2022-09-28 | End: 2022-10-03

## 2022-09-28 RX ADMIN — POLYETHYLENE GLYCOL 3350 17 GRAM(S): 17 POWDER, FOR SOLUTION ORAL at 14:22

## 2022-09-28 RX ADMIN — Medication 50 MILLIGRAM(S): at 23:50

## 2022-09-28 RX ADMIN — OLANZAPINE 2.5 MILLIGRAM(S): 15 TABLET, FILM COATED ORAL at 23:48

## 2022-09-28 RX ADMIN — WARFARIN SODIUM 3 MILLIGRAM(S): 2.5 TABLET ORAL at 23:50

## 2022-09-28 RX ADMIN — LIDOCAINE 1 PATCH: 4 CREAM TOPICAL at 11:58

## 2022-09-28 RX ADMIN — LIDOCAINE 1 PATCH: 4 CREAM TOPICAL at 19:22

## 2022-09-28 RX ADMIN — SODIUM CHLORIDE 75 MILLILITER(S): 9 INJECTION INTRAMUSCULAR; INTRAVENOUS; SUBCUTANEOUS at 14:22

## 2022-09-28 RX ADMIN — Medication 100 MILLIGRAM(S): at 14:22

## 2022-09-28 RX ADMIN — SIMVASTATIN 10 MILLIGRAM(S): 20 TABLET, FILM COATED ORAL at 23:49

## 2022-09-28 RX ADMIN — Medication 500 MILLIGRAM(S): at 11:57

## 2022-09-28 RX ADMIN — Medication 1000 UNIT(S): at 11:58

## 2022-09-28 RX ADMIN — Medication 100 MILLIGRAM(S): at 23:48

## 2022-09-28 RX ADMIN — SENNA PLUS 2 TABLET(S): 8.6 TABLET ORAL at 23:48

## 2022-09-28 RX ADMIN — Medication 40 MILLIGRAM(S): at 18:19

## 2022-09-28 RX ADMIN — LIDOCAINE 1 PATCH: 4 CREAM TOPICAL at 23:56

## 2022-09-28 NOTE — PROGRESS NOTE ADULT - PROBLEM SELECTOR PLAN 1
- B/l Hand swelling reported and noted unknown etiology Cellulitis vs. Gout vs. Septic arthritis   - B/l Hand xray c/w OA as noted above   - Unknown etiology  - ESR elevated   - Uric acid & JUVENTINO wnl  - ID-Dr. Pereyra consulted, appreciated   - Started Prednisone for possible Gout  - C/w Cefazolin for joint infection   - CT Right hand pending-f/u results.  As discussed w/ CT 9/28 pt currently has PIV on right.  CT requested for study that IV be removed and placed on left.  NP discussed w/ pt's RN and RN to replace IV. - B/l Hand swelling reported and noted unknown etiology Cellulitis vs. Gout vs. Septic arthritis   - B/l Hand xray c/w OA as noted above   - Unknown etiology  - ESR elevated   - Uric acid & JUVENTINO wnl  - ID-Dr. Pereyra consulted, appreciated   - C/w Prednisone for possible Gout  - C/w Cefazolin for joint infection   - CT Right hand pending-f/u results.  As discussed w/ CT 9/28 pt currently has PIV on right.  CT requested for study that IV be removed and placed on left.  NP discussed w/ pt's RN and RN to replace IV.

## 2022-09-28 NOTE — PROGRESS NOTE ADULT - ASSESSMENT
87 yr old female, from NH, H/O dementia/HTN/HLd/.DM/DVT/OA shoulder, admit hospital for AMS/agitation/combative at NH refused eating refused meds, refused care, CT head no acute finding, COVID-19 swab negative , CHARI/hypernatremia, start IV hydration, F/U urine culture  negative ,  CXR no infiltrate, psych consult, fall precaution. DX AMS/agitation/behavior/metabolic encephalopathy/CHARI/hypernatremia. Still combative refused eat pulled IV line, refused meds refused care, Zyprexa IM prn for agitation, zyprexa 2.5 mg po HS, more calm, fall precaution,  COVID-19 negative. Advance care discussed, full code. Still  spiked fever  101F, pan culture, right hand/finger swelling/warm on touch,  septic vs inflammatory arthritis(?), pan culture CT scan  right hand, ESR/JUEVNTINO/CRP,  ID consult, tylenol prn for fever, will start  Rocephine after blood culture, steroid

## 2022-09-28 NOTE — PROGRESS NOTE ADULT - ASSESSMENT
Patient is a 87y old  Female with PMH of dementia ,  HTN, HLD, T2DM, chronic DVT, OA, restless leg syndrome, constipation, frequent UTIs, and insomnia who has been sent in to the ER on 9/22/22 from [place] at [facility] for evaluation of altered mental status. and agitation uncharacteristic of her baseline. In ED pt was administered 2.5mg haldol and 2.5mg olanzapine for agitation. The CTH negative for acute infarct, UA negative, and CXR WNL. On day 3 of hospitalization , 9/25/22, she start having fever but no blood cultures noted in the system, also developed Right hand swelling. Today, 9/27/22, The ID consult requested to assist with further evaluation and antibiotic management.     # Fever - R/O Infectious etiology   # Right hand cellulitis vs septic arthritis vs Gout with superimposed infection   # Metabolic encephalopathy     would recommend:    1. Follow up Blood cultures, are in process   2. Continue prednisone with quick taper off  3. Pain management as needed  4. Keep Right hand elevated  5. CT scan of Right hand to rule out septic arthritis  6. Continue Cefazolin until work up is done     d/w Covering Shahid MARQUEZ    Attending Attestation:    Spent more than 45 minutes on total encounter, more than 50 % of the visit was spent counseling and/or coordinating care by the Attending physician.

## 2022-09-28 NOTE — PROGRESS NOTE ADULT - PROBLEM SELECTOR PLAN 5
- Home med Coumadin 2mg QHS   - INR 2.22 upon admission.  INR remains subtherapeutic 1. 43 today    - Increased Coumadin 3mg tonight    - Continue to monitor INR daily.  INR (Goal 2-3).  INR in AM-f/u results

## 2022-09-28 NOTE — PROGRESS NOTE ADULT - SUBJECTIVE AND OBJECTIVE BOX
Patient is seen and examined at the bed side, is afebrile The pain and swelling of Right hand is improving. The Xray of Right hand reviewed.         REVIEW OF SYSTEMS: All other review systems are negative      ALLERGIES: No Known Allergies      Vital Signs Last 24 Hrs  T(C): 36.9 (28 Sep 2022 13:50), Max: 37.1 (28 Sep 2022 06:30)  T(F): 98.5 (28 Sep 2022 13:50), Max: 98.7 (28 Sep 2022 06:30)  HR: 82 (28 Sep 2022 13:50) (73 - 82)  BP: 107/59 (28 Sep 2022 13:50) (105/73 - 109/58)  BP(mean): --  RR: 18 (28 Sep 2022 13:50) (18 - 18)  SpO2: 100% (28 Sep 2022 13:50) (97% - 100%)    Parameters below as of 28 Sep 2022 13:50  Patient On (Oxygen Delivery Method): room air        PHYSICAL EXAM:  GENERAL: Not in distress   CHEST/LUNG:  Not using accessory muscles   HEART: s1 and s2 present  ABDOMEN:  Nontender and  Nondistended  EXTREMITIES: Right hand redness, tenderness and swelling is improving   CNS: Awake and Alert      LABS:                        13.3   8.72  )-----------( 149      ( 28 Sep 2022 06:25 )             42.0                           13.7   8.87  )-----------( 154      ( 27 Sep 2022 10:40 )             42.7       09-28    141  |  108  |  26<H>  ----------------------------<  163<H>  4.1   |  25  |  1.30    Ca    9.1      28 Sep 2022 06:25  Phos  2.6     09-27  Mg     1.8     09-27    TPro  7.0  /  Alb  2.2<L>  /  TBili  0.5  /  DBili  x   /  AST  21  /  ALT  25  /  AlkPhos  64  09-27 09-27    142  |  108  |  19<H>  ----------------------------<  178<H>  3.7   |  26  |  1.28    Ca    8.8      27 Sep 2022 10:40  Phos  2.6     09-27  Mg     1.8     09-27    TPro  7.0  /  Alb  2.2<L>  /  TBili  0.5  /  DBili  x   /  AST  21  /  ALT  25  /  AlkPhos  64  09-27    PT/INR - ( 27 Sep 2022 10:40 )   PT: 21.5 sec;   INR: 1.80 ratio          MEDICATIONS  (STANDING):    ascorbic acid 500 milliGRAM(s) Oral daily  ceFAZolin   IVPB 2000 milliGRAM(s) IV Intermittent every 8 hours  cholecalciferol 1000 Unit(s) Oral dailyonce  dextrose 50% Injectable 25 Gram(s) IV Push once  glucagon  Injectable 1 milliGRAM(s) IntraMuscular once  insulin lispro (ADMELOG) corrective regimen sliding scale   SubCutaneous three times a day before meals  lidocaine   4% Patch 1 Patch Transdermal daily  losartan 100 milliGRAM(s) Oral daily  NIFEdipine XL 30 milliGRAM(s) Oral daily  OLANZapine 2.5 milliGRAM(s) Oral at bedtime  polyethylene glycol 3350 17 Gram(s) Oral daily  predniSONE   Tablet 40 milliGRAM(s) Oral every 24 hours  senna 2 Tablet(s) Oral at bedtime  simvastatin 10 milliGRAM(s) Oral at bedtime  sodium chloride 0.9%. 1000 milliLiter(s) (75 mL/Hr) IV Continuous <Continuous>  traZODone 50 milliGRAM(s) Oral at bedtime  warfarin 3 milliGRAM(s) Oral once      RADIOLOGY & ADDITIONAL TESTS:    9/27/22: Xray Hand 2 Views, Bilateral (09.27.22 @ 18:48) >  Three views of each hand demonstrate mild to moderate degenerative change with no fracture, dislocation or radiographic soft tissue abnormality.   Evaluation of the right digits limited by flexion        9/22/22: CT Head No Cont (09.22.22 @ 03:50) No acute intracranial hemorrhage or mass effect.      9/21/22: Xray Chest 1 View- PORTABLE-Urgent (Xray Chest 1 View- PORTABLE-Urgent .) (09.21.22 @ 22:40) Heart magnified by technique.    There is a slightly increased interstitial pattern in the lungs compared to August 17, 2021. Advanced shoulder degeneration and mild right   tracheal deviation at thoracic inlet again noted.        MICROBIOLOGY DATA:      COVID-19 PCR . (09.26.22 @ 12:27)   COVID-19 PCR: NotDetec

## 2022-09-28 NOTE — PROGRESS NOTE ADULT - PROBLEM SELECTOR PLAN 8
- not on any home medications  - monitor glucose - not on any home medications  - A1c=6.7   - Continue to monitor glucose while on steroids   - C/w HSS and adjust insulin accordingly

## 2022-09-28 NOTE — PROGRESS NOTE ADULT - SUBJECTIVE AND OBJECTIVE BOX
Patient is a 87y old  Female who presents with a chief complaint of Altered Mental Status (27 Sep 2022 14:40)/fever/septic joint(?)      INTERVAL HPI/OVERNIGHT EVENTS:  T(C): 37.1 (09-28-22 @ 06:30), Max: 37.2 (09-27-22 @ 14:11)  HR: 80 (09-28-22 @ 06:30) (73 - 84)  BP: 105/73 (09-28-22 @ 06:30) (105/73 - 125/50)  RR: 18 (09-28-22 @ 06:30) (17 - 18)  SpO2: 100% (09-28-22 @ 06:30) (97% - 100%)  Wt(kg): --    LABS:                        13.3   8.72  )-----------( 149      ( 28 Sep 2022 06:25 )             42.0     09-28    141  |  108  |  26<H>  ----------------------------<  163<H>  4.1   |  25  |  1.30    Ca    9.1      28 Sep 2022 06:25  Phos  2.6     09-27  Mg     1.8     09-27    TPro  7.0  /  Alb  2.2<L>  /  TBili  0.5  /  DBili  x   /  AST  21  /  ALT  25  /  AlkPhos  64  09-27    PT/INR - ( 28 Sep 2022 06:25 )   PT: 17.1 sec;   INR: 1.43 ratio             CAPILLARY BLOOD GLUCOSE      POCT Blood Glucose.: 142 mg/dL (28 Sep 2022 07:58)        RADIOLOGY & ADDITIONAL TESTS:    Consultant(s) Notes Reviewed:  [x ] YES  [ ] NO    PHYSICAL EXAM:  GENERAL: well built, well nourished  HEAD:  Atraumatic, Normocephalic  EYES: EOMI, PERRLA, conjunctiva and sclera clear  ENT: No tonsillar erythema, exudates, or enlargement; Moist mucous membranes, Good dentition, No lesions  NECK: Supple, No JVD, Normal thyroid, no enlarged nodes  NERVOUS SYSTEM:  confused  CHEST/LUNG: B/L good air entry; No rales, rhonchi, or wheezing  HEART: S1S2 normal, no S3, Regular rate and rhythm; No murmurs, rubs, or gallops  ABDOMEN: Soft, Nontender, Nondistended; Bowel sounds present  EXTREMITIES:  2+ Peripheral Pulses, No clubbing, cyanosis, right hand/finger warm on touch/swelling  LYMPH: No lymphadenopathy noted  SKIN: No rashes or lesions    Care Discussed with Consultants/Other Providers [ x] YES  [ ] NO

## 2022-09-28 NOTE — PROGRESS NOTE ADULT - PROBLEM SELECTOR PLAN 11
- B/l Hand swelling reported and noted unknown etiology Cellulitis vs. Gout vs. Septic arthritis. If Septic arthritis will need PICC/Extended dwell for at least 4wks of abx.
PMH restless leg syndrome  no medications per nursing home documents

## 2022-09-28 NOTE — PROGRESS NOTE ADULT - ASSESSMENT
87 year old female, from [facility], with PMH of Dementia, HTN, HLD, DM, DVT (on Coumadin), & Osteoarthriris-shoulder.   Presented to the ED for refusal to eating,  refusing meds & care.  Admitted for Toxic Metabolic Encephalopathy with agitation and combativeness, CHARI, and Hypernatremia.     CT head no acute finding.  COVID-19 swab negative.  Urine culture negative, and CXR no infiltrate.    Combative refused eat pulled IV line, refused meds refused care.  Started Zyprexa IM PRN for agitation & Zyprexa 2.5mg po QHS.  DC'd IM.  Per Psych on Zyprexa 2.5mg PO Q8h PRN for anxiety/agitation.      Hospital course complicated by fevers 100.4.      Pysch consult appreciated.  Pt has been calm and cooperative since addition of Zyprexa.

## 2022-09-28 NOTE — PROGRESS NOTE ADULT - SUBJECTIVE AND OBJECTIVE BOX
NP Note discussed with  primary attending    Patient is a 87y old  Female who presents with a chief complaint of Altered Mental Status (28 Sep 2022 12:26)      INTERVAL HPI/OVERNIGHT EVENTS: Pt aroused from sleep.  Shook head to questions.    MEDICATIONS  (STANDING):  ascorbic acid 500 milliGRAM(s) Oral daily  ceFAZolin   IVPB 2000 milliGRAM(s) IV Intermittent every 8 hours  cholecalciferol 1000 Unit(s) Oral daily  lidocaine   4% Patch 1 Patch Transdermal daily  losartan 100 milliGRAM(s) Oral daily  NIFEdipine XL 30 milliGRAM(s) Oral daily  OLANZapine 2.5 milliGRAM(s) Oral at bedtime  polyethylene glycol 3350 17 Gram(s) Oral daily  predniSONE   Tablet 40 milliGRAM(s) Oral every 24 hours  senna 2 Tablet(s) Oral at bedtime  simvastatin 10 milliGRAM(s) Oral at bedtime  sodium chloride 0.9%. 1000 milliLiter(s) (75 mL/Hr) IV Continuous <Continuous>  traZODone 50 milliGRAM(s) Oral at bedtime  warfarin 3 milliGRAM(s) Oral once    MEDICATIONS  (PRN):  acetaminophen     Tablet .. 650 milliGRAM(s) Oral every 6 hours PRN Temp greater or equal to 38C (100.4F), Mild Pain (1 - 3), Moderate Pain (4 - 6)  OLANZapine 2.5 milliGRAM(s) Oral every 8 hours PRN Anxiety/agitation      __________________________________________________  REVIEW OF SYSTEMS:  Limited in demented pt     CONSTITUTIONAL: No fever  EYES: No acute visual disturbances  NECK: No pain or stiffness  RESPIRATORY: No cough; No shortness of breath  CARDIOVASCULAR: No chest pain, no palpitations  GASTROINTESTINAL: No pain. No nausea or vomiting.  No diarrhea   NEUROLOGICAL: No headache or numbness, no tremors  MUSCULOSKELETAL: Reports bilateral hand pain, no muscle pain  GENITOURINARY: No dysuria, no frequency, no hesitancy  PSYCHIATRY: No depression , no anxiety  ALL OTHER  ROS negative      Vital Signs Last 24 Hrs  T(C): 36.9 (28 Sep 2022 13:50), Max: 37.1 (28 Sep 2022 06:30)  T(F): 98.5 (28 Sep 2022 13:50), Max: 98.7 (28 Sep 2022 06:30)  HR: 82 (28 Sep 2022 13:50) (73 - 82)  BP: 107/59 (28 Sep 2022 13:50) (105/73 - 109/58)  RR: 18 (28 Sep 2022 13:50) (18 - 18)  SpO2: 100% (28 Sep 2022 13:50) (97% - 100%)    Parameters below as of 28 Sep 2022 13:50  Patient On (Oxygen Delivery Method): room air        ________________________________________________  PHYSICAL EXAM:  GENERAL: NAD  HEENT: Normocephalic;  conjunctivae and sclerae clear; moist mucous membranes.    NECK : Supple  CHEST/LUNG: Clear to auscultation bilaterally with good air entry   HEART: S1 S2  regular; no murmurs, gallops or rubs  ABDOMEN: Soft, Nontender, Nondistended; Bowel sounds present x 4 quad.   EXTREMITIES: No cyanosis; (+) B/l hand swelling edema; no calf tenderness  SKIN: Warm and dry; no rash  NERVOUS SYSTEM:  Awake and alert; Oriented to place and person, not time; no new deficits  _________________________________________________  LABS:                        13.3   8.72  )-----------( 149      ( 28 Sep 2022 06:25 )             42.0     09-28    141  |  108  |  26<H>  ----------------------------<  163<H>  4.1   |  25  |  1.30    Ca    9.1      28 Sep 2022 06:25  Phos  2.6     09-27  Mg     1.8     09-27    TPro  7.0  /  Alb  2.2<L>  /  TBili  0.5  /  DBili  x   /  AST  21  /  ALT  25  /  AlkPhos  64  09-27    PT/INR - ( 28 Sep 2022 06:25 )   PT: 17.1 sec;   INR: 1.43 ratio             CAPILLARY BLOOD GLUCOSE      POCT Blood Glucose.: 142 mg/dL (28 Sep 2022 07:58)        RADIOLOGY & ADDITIONAL TESTS:  x< from: Xray Hand 2 Views, Bilateral (09.27.22 @ 18:48) >    ACC: [id number] EXAM:  XR HAND 2 VIEWS BI                          PROCEDURE DATE:  09/27/2022          INTERPRETATION:  Clinical history: 87-year-old female, hand swelling.    Three views of each hand demonstrate mild to moderate degenerative change  with no fracture, dislocation or radiographic soft tissue abnormality.   Evaluation of the right digits limited by flexion    IMPRESSION:  Mild to moderate OA with no acute radiographic findings    --- End of Report ---            BRYNN BA; Attending Radiologist  This document has been electronically signed. Sep 28 2022 12:23PM    < end of copied text >      Imaging Personally Reviewed:  YES    Consultant(s) Notes Reviewed:   YES    Care Discussed with Consultants :     Plan of care was discussed with patient and /or primary care giver; all questions and concerns were addressed and care was aligned with patient's wishes.

## 2022-09-29 LAB
ALBUMIN SERPL ELPH-MCNC: 1.8 G/DL — LOW (ref 3.5–5)
ALP SERPL-CCNC: 59 U/L — SIGNIFICANT CHANGE UP (ref 40–120)
ALT FLD-CCNC: 36 U/L DA — SIGNIFICANT CHANGE UP (ref 10–60)
ANION GAP SERPL CALC-SCNC: 10 MMOL/L — SIGNIFICANT CHANGE UP (ref 5–17)
ANION GAP SERPL CALC-SCNC: 4 MMOL/L — LOW (ref 5–17)
ANION GAP SERPL CALC-SCNC: 7 MMOL/L — SIGNIFICANT CHANGE UP (ref 5–17)
AST SERPL-CCNC: 34 U/L — SIGNIFICANT CHANGE UP (ref 10–40)
BILIRUB SERPL-MCNC: 0.1 MG/DL — LOW (ref 0.2–1.2)
BUN SERPL-MCNC: 33 MG/DL — HIGH (ref 7–18)
BUN SERPL-MCNC: 36 MG/DL — HIGH (ref 7–18)
BUN SERPL-MCNC: 37 MG/DL — HIGH (ref 7–18)
CALCIUM SERPL-MCNC: 8 MG/DL — LOW (ref 8.4–10.5)
CALCIUM SERPL-MCNC: 8.4 MG/DL — SIGNIFICANT CHANGE UP (ref 8.4–10.5)
CALCIUM SERPL-MCNC: 8.5 MG/DL — SIGNIFICANT CHANGE UP (ref 8.4–10.5)
CHLORIDE SERPL-SCNC: 102 MMOL/L — SIGNIFICANT CHANGE UP (ref 96–108)
CHLORIDE SERPL-SCNC: 111 MMOL/L — HIGH (ref 96–108)
CHLORIDE SERPL-SCNC: 114 MMOL/L — HIGH (ref 96–108)
CO2 SERPL-SCNC: 24 MMOL/L — SIGNIFICANT CHANGE UP (ref 22–31)
CO2 SERPL-SCNC: 25 MMOL/L — SIGNIFICANT CHANGE UP (ref 22–31)
CO2 SERPL-SCNC: 26 MMOL/L — SIGNIFICANT CHANGE UP (ref 22–31)
CREAT SERPL-MCNC: 1.1 MG/DL — SIGNIFICANT CHANGE UP (ref 0.5–1.3)
CREAT SERPL-MCNC: 1.18 MG/DL — SIGNIFICANT CHANGE UP (ref 0.5–1.3)
CREAT SERPL-MCNC: 2.89 MG/DL — HIGH (ref 0.5–1.3)
EGFR: 15 ML/MIN/1.73M2 — LOW
EGFR: 45 ML/MIN/1.73M2 — LOW
EGFR: 49 ML/MIN/1.73M2 — LOW
GLUCOSE BLDC GLUCOMTR-MCNC: 187 MG/DL — HIGH (ref 70–99)
GLUCOSE BLDC GLUCOMTR-MCNC: 220 MG/DL — HIGH (ref 70–99)
GLUCOSE BLDC GLUCOMTR-MCNC: 227 MG/DL — HIGH (ref 70–99)
GLUCOSE BLDC GLUCOMTR-MCNC: 235 MG/DL — HIGH (ref 70–99)
GLUCOSE BLDC GLUCOMTR-MCNC: 237 MG/DL — HIGH (ref 70–99)
GLUCOSE SERPL-MCNC: 203 MG/DL — HIGH (ref 70–99)
GLUCOSE SERPL-MCNC: 237 MG/DL — HIGH (ref 70–99)
GLUCOSE SERPL-MCNC: 353 MG/DL — HIGH (ref 70–99)
HCT VFR BLD CALC: 36.5 % — SIGNIFICANT CHANGE UP (ref 34.5–45)
HGB BLD-MCNC: 11.6 G/DL — SIGNIFICANT CHANGE UP (ref 11.5–15.5)
INR BLD: 1.55 RATIO — HIGH (ref 0.88–1.16)
MCHC RBC-ENTMCNC: 29.9 PG — SIGNIFICANT CHANGE UP (ref 27–34)
MCHC RBC-ENTMCNC: 31.8 GM/DL — LOW (ref 32–36)
MCV RBC AUTO: 94.1 FL — SIGNIFICANT CHANGE UP (ref 80–100)
NRBC # BLD: 0 /100 WBCS — SIGNIFICANT CHANGE UP (ref 0–0)
PLATELET # BLD AUTO: 175 K/UL — SIGNIFICANT CHANGE UP (ref 150–400)
POTASSIUM SERPL-MCNC: 3.9 MMOL/L — SIGNIFICANT CHANGE UP (ref 3.5–5.3)
POTASSIUM SERPL-MCNC: 4 MMOL/L — SIGNIFICANT CHANGE UP (ref 3.5–5.3)
POTASSIUM SERPL-MCNC: 4.5 MMOL/L — SIGNIFICANT CHANGE UP (ref 3.5–5.3)
POTASSIUM SERPL-SCNC: 3.9 MMOL/L — SIGNIFICANT CHANGE UP (ref 3.5–5.3)
POTASSIUM SERPL-SCNC: 4 MMOL/L — SIGNIFICANT CHANGE UP (ref 3.5–5.3)
POTASSIUM SERPL-SCNC: 4.5 MMOL/L — SIGNIFICANT CHANGE UP (ref 3.5–5.3)
PROT SERPL-MCNC: 6.5 G/DL — SIGNIFICANT CHANGE UP (ref 6–8.3)
PROTHROM AB SERPL-ACNC: 18.5 SEC — HIGH (ref 10.5–13.4)
RBC # BLD: 3.88 M/UL — SIGNIFICANT CHANGE UP (ref 3.8–5.2)
RBC # FLD: 13 % — SIGNIFICANT CHANGE UP (ref 10.3–14.5)
SODIUM SERPL-SCNC: 136 MMOL/L — SIGNIFICANT CHANGE UP (ref 135–145)
SODIUM SERPL-SCNC: 143 MMOL/L — SIGNIFICANT CHANGE UP (ref 135–145)
SODIUM SERPL-SCNC: 144 MMOL/L — SIGNIFICANT CHANGE UP (ref 135–145)
WBC # BLD: 6.52 K/UL — SIGNIFICANT CHANGE UP (ref 3.8–10.5)
WBC # FLD AUTO: 6.52 K/UL — SIGNIFICANT CHANGE UP (ref 3.8–10.5)

## 2022-09-29 RX ORDER — INSULIN LISPRO 100/ML
VIAL (ML) SUBCUTANEOUS AT BEDTIME
Refills: 0 | Status: DISCONTINUED | OUTPATIENT
Start: 2022-09-29 | End: 2022-10-03

## 2022-09-29 RX ORDER — WARFARIN SODIUM 2.5 MG/1
3 TABLET ORAL ONCE
Refills: 0 | Status: COMPLETED | OUTPATIENT
Start: 2022-09-29 | End: 2022-09-29

## 2022-09-29 RX ADMIN — Medication 100 MILLIGRAM(S): at 14:41

## 2022-09-29 RX ADMIN — Medication 500 MILLIGRAM(S): at 12:41

## 2022-09-29 RX ADMIN — LIDOCAINE 1 PATCH: 4 CREAM TOPICAL at 19:43

## 2022-09-29 RX ADMIN — SIMVASTATIN 10 MILLIGRAM(S): 20 TABLET, FILM COATED ORAL at 21:29

## 2022-09-29 RX ADMIN — Medication 100 MILLIGRAM(S): at 21:29

## 2022-09-29 RX ADMIN — OLANZAPINE 2.5 MILLIGRAM(S): 15 TABLET, FILM COATED ORAL at 21:28

## 2022-09-29 RX ADMIN — WARFARIN SODIUM 3 MILLIGRAM(S): 2.5 TABLET ORAL at 21:29

## 2022-09-29 RX ADMIN — POLYETHYLENE GLYCOL 3350 17 GRAM(S): 17 POWDER, FOR SOLUTION ORAL at 12:42

## 2022-09-29 RX ADMIN — Medication 2: at 16:55

## 2022-09-29 RX ADMIN — Medication 40 MILLIGRAM(S): at 18:31

## 2022-09-29 RX ADMIN — LIDOCAINE 1 PATCH: 4 CREAM TOPICAL at 12:40

## 2022-09-29 RX ADMIN — Medication 1000 UNIT(S): at 12:41

## 2022-09-29 RX ADMIN — Medication 2: at 08:36

## 2022-09-29 RX ADMIN — Medication 50 MILLIGRAM(S): at 21:28

## 2022-09-29 RX ADMIN — Medication 100 MILLIGRAM(S): at 06:12

## 2022-09-29 RX ADMIN — SODIUM CHLORIDE 75 MILLILITER(S): 9 INJECTION INTRAMUSCULAR; INTRAVENOUS; SUBCUTANEOUS at 02:51

## 2022-09-29 RX ADMIN — Medication 1: at 12:48

## 2022-09-29 NOTE — PROGRESS NOTE ADULT - SUBJECTIVE AND OBJECTIVE BOX
Patient is a 87y old  Female who presents with a chief complaint of Altered Mental Status (28 Sep 2022 15:21)/metabolic encephaloapthy/CHARI/cellulitis right hand/septic joint(?), continue IV ABS, F/U blood culture , continue steroid      INTERVAL HPI/OVERNIGHT EVENTS:  T(C): 36.7 (09-29-22 @ 05:54), Max: 36.9 (09-28-22 @ 13:50)  HR: 61 (09-29-22 @ 05:54) (61 - 82)  BP: 118/53 (09-29-22 @ 05:54) (107/59 - 129/67)  RR: 17 (09-29-22 @ 05:54) (17 - 18)  SpO2: 94% (09-29-22 @ 05:54) (94% - 100%)  Wt(kg): --    LABS:                        11.6   6.52  )-----------( 175      ( 29 Sep 2022 06:57 )             36.5     09-29    136  |  102  |  36<H>  ----------------------------<  353<H>  4.0   |  24  |  2.89<H>    Ca    8.0<L>      29 Sep 2022 06:57  Phos  2.6     09-27  Mg     1.8     09-27    TPro  7.0  /  Alb  2.2<L>  /  TBili  0.5  /  DBili  x   /  AST  21  /  ALT  25  /  AlkPhos  64  09-27    PT/INR - ( 29 Sep 2022 06:57 )   PT: 18.5 sec;   INR: 1.55 ratio             CAPILLARY BLOOD GLUCOSE      POCT Blood Glucose.: 220 mg/dL (29 Sep 2022 07:50)  POCT Blood Glucose.: 197 mg/dL (28 Sep 2022 21:43)  POCT Blood Glucose.: 124 mg/dL (28 Sep 2022 17:06)        RADIOLOGY & ADDITIONAL TESTS:    Consultant(s) Notes Reviewed:  [x ] YES  [ ] NO    PHYSICAL EXAM:  GENERAL: well built, well nourished  HEAD:  Atraumatic, Normocephalic  EYES: EOMI, PERRLA, conjunctiva and sclera clear  ENT: No tonsillar erythema, exudates, or enlargement; Moist mucous membranes, Good dentition, No lesions  NECK: Supple, No JVD, Normal thyroid, no enlarged nodes  NERVOUS SYSTEM:  more calm  CHEST/LUNG: B/L good air entry; No rales, rhonchi, or wheezing  HEART: S1S2 normal, no S3, Regular rate and rhythm; No murmurs, rubs, or gallops  ABDOMEN: Soft, Nontender, Nondistended; Bowel sounds present  EXTREMITIES:  2+ Peripheral Pulses, No clubbing, cyanosis, right hand swelling/hot on touch improving  LYMPH: No lymphadenopathy noted  SKIN: No rashes or lesions    Care Discussed with Consultants/Other Providers [ x] YES  [ ] NO

## 2022-09-29 NOTE — CHART NOTE - NSCHARTNOTEFT_GEN_A_CORE
Reassessment:     Patient is a 87y old  Female who presents with a chief complaint of Altered Mental Status (29 Sep 2022 13:11)      Factors impacting intake: [ ] none [ ] nausea  [ ] vomiting [ ] diarrhea [ ] constipation  [ ]chewing problems [ ] swallowing issues  [X ] other: Advanced dementia, swelling of both hands, DVT    Diet Prescription: Diet, Pureed:   DASH/ TLC {Sodium & Cholesterol Restricted}  Supplement Feeding Modality:  Oral  Ensure Enlive Cans or Servings Per Day:  1       Frequency:  Three Times a day (22 @ 17:27)    Intake: Patient COVID exposure, visited pt. alert but weak, at times confused, d/w PCA, states pt. with improved "appetite', consumed ~40% of pureed entree & enjoys "eating puddings" with feeding assists, also drank >50% of Ensure Supplement, no reports of GI distress, had BM today, on Abx. & ID following, Psych consulted, rec. c/w diet as ordered & Ensure Enlive TID daily. RD available.       Daily Weight in k.9 (28 Sep 2022 06:30)    % Weight Change: slight wt. loss noted    Pertinent Medications: MEDICATIONS  (STANDING):  ascorbic acid 500 milliGRAM(s) Oral daily  ceFAZolin   IVPB 2000 milliGRAM(s) IV Intermittent every 8 hours  cholecalciferol 1000 Unit(s) Oral daily  dextrose 5%. 1000 milliLiter(s) (50 mL/Hr) IV Continuous <Continuous>  dextrose 5%. 1000 milliLiter(s) (100 mL/Hr) IV Continuous <Continuous>  dextrose 50% Injectable 25 Gram(s) IV Push once  dextrose 50% Injectable 12.5 Gram(s) IV Push once  dextrose 50% Injectable 25 Gram(s) IV Push once  glucagon  Injectable 1 milliGRAM(s) IntraMuscular once  insulin lispro (ADMELOG) corrective regimen sliding scale   SubCutaneous three times a day before meals  lidocaine   4% Patch 1 Patch Transdermal daily  losartan 100 milliGRAM(s) Oral daily  NIFEdipine XL 30 milliGRAM(s) Oral daily  OLANZapine 2.5 milliGRAM(s) Oral at bedtime  polyethylene glycol 3350 17 Gram(s) Oral daily  predniSONE   Tablet 40 milliGRAM(s) Oral every 24 hours  senna 2 Tablet(s) Oral at bedtime  simvastatin 10 milliGRAM(s) Oral at bedtime  sodium chloride 0.9%. 1000 milliLiter(s) (75 mL/Hr) IV Continuous <Continuous>  traZODone 50 milliGRAM(s) Oral at bedtime  warfarin 3 milliGRAM(s) Oral once    MEDICATIONS  (PRN):  acetaminophen     Tablet .. 650 milliGRAM(s) Oral every 6 hours PRN Temp greater or equal to 38C (100.4F), Mild Pain (1 - 3), Moderate Pain (4 - 6)  dextrose Oral Gel 15 Gram(s) Oral once PRN Blood Glucose LESS THAN 70 milliGRAM(s)/deciliter  OLANZapine 2.5 milliGRAM(s) Oral every 8 hours PRN Anxiety/agitation    Pertinent Labs:  Na143 mmol/L Glu 237 mg/dL<H> K+ 3.9 mmol/L Cr  1.18 mg/dL BUN 37 mg/dL<H>  Phos 2.6 mg/dL  Alb 1.8 g/dL<L>     CAPILLARY BLOOD GLUCOSE      POCT Blood Glucose.: 227 mg/dL (29 Sep 2022 16:48)  POCT Blood Glucose.: 187 mg/dL (29 Sep 2022 12:43)  POCT Blood Glucose.: 237 mg/dL (29 Sep 2022 11:16)  POCT Blood Glucose.: 220 mg/dL (29 Sep 2022 07:50)  POCT Blood Glucose.: 197 mg/dL (28 Sep 2022 21:43)  POCT Blood Glucose.: 124 mg/dL (28 Sep 2022 17:06)    Skin: intact    Estimated Needs:   [X ] no change since previous assessment  [ ] recalculated:     Previous Nutrition Diagnosis:   [ ] Inadequate Energy Intake [ ]Inadequate Oral Intake [ ] Excessive Energy Intake   [ ] Underweight [ ] Increased Nutrient Needs [ ] Overweight/Obesity   [ ] Altered GI Function [ ] Unintended Weight Loss [ ] Food & Nutrition Related Knowledge Deficit [Severe ] Malnutrition     Nutrition Diagnosis is [ X] ongoing  [ ] resolved [ ] not applicable     New Nutrition Diagnosis: [ ] not applicable       Interventions: To meet nutrition needs   Recommend  [ ] Change Diet To:  [ ] Nutrition Supplement  [ ] Nutrition Support  [X ] Other: Nursing to continue feeding assistance and encouragement, aspiration precaution     Monitoring and Evaluation:   [ X] PO intake [ x ] Tolerance to diet prescription [ x ] weights [ x ] labs[ x ] follow up per protocol  [ ] other:
Unable to obtain consent for CT scan IV contrast as pt. herself is not cooperating.  Contacted her son Mr. Hari Hill at [phone] who told me that his sister should consent,  Contacted his sister Ms. Fany Field at [phone] who did not answer.  Pt. refused to allow me to interview her and fill out form for her, CT on hold for now.
EVENT: Notified by nurse regarding patient with elevated temp     HPI: 87F with a PMH dementia (alert and disoriented at baseline per chart), HTN, HLD, T2DM, chronic DVT, OA, restless leg syndrome, constipation, frequent UTIs, and insomnia who presented from [place] at [facility] for altered mental status; pt admitted for acute toxic metabolic encephalopathy, hypernatremia, and CHARI.    Late Entry 9/26/2022 -2040  Patient with elevated temp- t-max overnight 100.3. Patient on D5 1/2NS. CTH negative for acute infarct, UA negative, and CXR WNL upon admission. COVID-19  swab negative.  Patient pleasantly confused alert and oriented x 2-3. Refusing meds.     OBJECTIVE:  Vital Signs Last 24 Hrs  T(C): 37.9 (27 Sep 2022 01:42), Max: 38.5 (27 Sep 2022 00:00)  T(F): 100.2 (27 Sep 2022 01:42), Max: 101.3 (27 Sep 2022 00:00)  HR: 88 (26 Sep 2022 20:40) (76 - 88)  BP: 150/62 (26 Sep 2022 20:40) (111/38 - 150/62)  BP(mean): --  RR: 18 (26 Sep 2022 20:40) (17 - 18)  SpO2: 95% (26 Sep 2022 20:40) (95% - 100%)    Parameters below as of 26 Sep 2022 20:40  Patient On (Oxygen Delivery Method): room air    MEDS  acetaminophen     Tablet .. 650 milliGRAM(s) Oral every 6 hours PRN  ascorbic acid 500 milliGRAM(s) Oral daily  cholecalciferol 1000 Unit(s) Oral daily  dextrose 5% + sodium chloride 0.45%. 1000 milliLiter(s) IV Continuous <Continuous>  lidocaine   4% Patch 1 Patch Transdermal daily  losartan 100 milliGRAM(s) Oral daily  NIFEdipine XL 30 milliGRAM(s) Oral daily  OLANZapine 2.5 milliGRAM(s) Oral at bedtime  polyethylene glycol 3350 17 Gram(s) Oral daily  senna 2 Tablet(s) Oral at bedtime  simvastatin 10 milliGRAM(s) Oral at bedtime  traZODone 50 milliGRAM(s) Oral at bedtime      LABS:                        13.4   8.56  )-----------( 148      ( 26 Sep 2022 06:52 )             42.5     09-26    142  |  109<H>  |  15  ----------------------------<  138<H>  3.5   |  27  |  1.00    Ca    8.5      26 Sep 2022 06:52    PROBLEM: Fever source unknown   PLAN:   Tylenol 650 mg PO as needed   cooling measure  supportive care   f/u AM CBC    FOLLOW UP / RESULT:  reassess vital signs.
EVENT: Notified by nurse regarding patient with elevated temp     HPI: 87F with a PMH dementia (alert and disoriented at baseline per chart), HTN, HLD, T2DM, chronic DVT, OA, restless leg syndrome, constipation, frequent UTIs, and insomnia who presented from [place] at [facility] for altered mental status; pt admitted for acute toxic metabolic encephalopathy, hypernatremia, and CHARI.    9/26/2022 - Patient with elevated temp- t-max overnight 100.7. Patient on D5 1/2NS. CTH negative for acute infarct, UA negative, and CXR WNL upon admission. Patient pleasantly confused alert and oriented x 2-3. Refusing meds     OBJECTIVE:  Vital Signs Last 24 Hrs  T(C): 38 (26 Sep 2022 05:36), Max: 38.2 (25 Sep 2022 20:23)  T(F): 100.4 (26 Sep 2022 05:36), Max: 100.7 (25 Sep 2022 20:23)  HR: 84 (26 Sep 2022 05:36) (83 - 84)  BP: 130/61 (26 Sep 2022 05:36) (130/61 - 148/64)  BP(mean): --  RR: 17 (26 Sep 2022 05:36) (16 - 17)  SpO2: 96% (26 Sep 2022 05:36) (96% - 100%)    Parameters below as of 26 Sep 2022 05:36  Patient On (Oxygen Delivery Method): room air    MEDS  acetaminophen     Tablet .. 650 milliGRAM(s) Oral every 6 hours PRN  ascorbic acid 500 milliGRAM(s) Oral daily  dextrose 5% + sodium chloride 0.45%. 1000 milliLiter(s) IV Continuous <Continuous>  lidocaine   4% Patch 1 Patch Transdermal daily  losartan 100 milliGRAM(s) Oral daily  NIFEdipine XL 30 milliGRAM(s) Oral daily  OLANZapine 2.5 milliGRAM(s) Oral at bedtime  polyethylene glycol 3350 17 Gram(s) Oral daily  senna 2 Tablet(s) Oral at bedtime  simvastatin 10 milliGRAM(s) Oral at bedtime  traZODone 50 milliGRAM(s) Oral at bedtime      FOCUSED PHYSICAL EXAM:  GENERAL: NAD  HEENT: Normocephalic;  moist mucous membranes;   NECK : supple  CHEST/LUNG: Clear to auscultation bilaterally  HEART: S1 S2 RRR  ABDOMEN: Soft, Nontender, Nondistended; Bowel sounds present  NERVOUS SYSTEM: A&O x 2-3    LABS:                        13.7   9.06  )-----------( 155      ( 25 Sep 2022 07:55 )             43.4     09-25    145  |  110<H>  |  17  ----------------------------<  129<H>  3.4<L>   |  28  |  1.25    Ca    9.2      25 Sep 2022 07:55      PROBLEM: Fever source unknown   PLAN:   Tylenol 650 mg PO as needed   UA ordered   cooling measure  supportive care   f/u AM CBC  FOLLOW UP / RESULT: f/u UA, reassess vital signs
Patient from [facility] under care of Dr. YONATAN Moctezuma who is aware of patient poor oral intake  d/w Dr. Moctezuma; will assume care tomorrow

## 2022-09-29 NOTE — PROGRESS NOTE ADULT - ASSESSMENT
87 year old female, from [facility], with PMH of Dementia, HTN, HLD, DM, DVT (on Coumadin), & Osteoarthriris-shoulder.   Presented to the ED for refusal to eating,  refusing meds & care.  Admitted for Toxic Metabolic Encephalopathy with agitation and combativeness, CHARI, and Hypernatremia.     CT head no acute finding.  COVID-19 swab negative.  Urine culture negative, and CXR no infiltrate.    Combative refused eat pulled IV line, refused meds refused care.  Started Zyprexa IM PRN for agitation & Zyprexa 2.5mg po QHS.  DC'd IM.  Per Psych on Zyprexa 2.5mg PO Q8h PRN for anxiety/agitation.      Hospital course complicated by fevers 100.4.      Pysch consult appreciated.  Pt has been calm and cooperative since addition of Zyprexa.      9/29-Pt. is calm and cooperative, reports pain in B/L hands R>L, fingers clenched to closed fist, able to open only partially.  X-Ray b/l hands shows Mild to moderate OA with no acute radiographic findings, awaiting right hand CT to R/O septic arthritis.  (pt.'s am BMP results suspicious for mistake.  repeat BMP acceptable).

## 2022-09-29 NOTE — PROGRESS NOTE ADULT - PROBLEM SELECTOR PLAN 7
- not on any home medications  - A1c=6.7   - Continue to monitor glucose while on steroids   - C/w HSS and adjust insulin accordingly

## 2022-09-29 NOTE — PROGRESS NOTE ADULT - ASSESSMENT
87 yr old female, from NH, H/O dementia/HTN/HLd/.DM/DVT/OA shoulder, admit hospital for AMS/agitation/combative at NH refused eating refused meds, refused care, CT head no acute finding, COVID-19 swab negative , CHARI/hypernatremia, start IV hydration, F/U urine culture  negative ,  CXR no infiltrate, psych consult, fall precaution. DX AMS/agitation/behavior/metabolic encephalopathy/CHARI/hypernatremia. Still combative refused eat pulled IV line, refused meds refused care, Zyprexa IM prn for agitation, zyprexa 2.5 mg po HS, more calm, fall precaution,  COVID-19 negative. Advance care discussed, full code. Still  spiked fever  101F, pan culture, right hand/finger swelling/warm on touch,  septic vs inflammatory arthritis(?), pan culture CT scan  right hand, ESR/JUVENTINO/CRP,  ID consult, tylenol prn for fever, continue IV ABS, steoid, F/U blood culture. Fall precaution, 9/26 exposure to COVID, swab negative 9/26

## 2022-09-29 NOTE — PROGRESS NOTE ADULT - ASSESSMENT
Patient is a 87y old  Female with PMH of dementia ,  HTN, HLD, T2DM, chronic DVT, OA, restless leg syndrome, constipation, frequent UTIs, and insomnia who has been sent in to the ER on 9/22/22 from [place] at [facility] for evaluation of altered mental status. and agitation uncharacteristic of her baseline. In ED pt was administered 2.5mg haldol and 2.5mg olanzapine for agitation. The CTH negative for acute infarct, UA negative, and CXR WNL. On day 3 of hospitalization , 9/25/22, she start having fever but no blood cultures noted in the system, also developed Right hand swelling. Today, 9/27/22, The ID consult requested to assist with further evaluation and antibiotic management.     # Fever - R/O Infectious etiology - Blood cultures from 9/28 have NGTD  # Right hand cellulitis vs septic arthritis vs Gout with superimposed infection - improving   # Metabolic encephalopathy     would recommend:    1. Follow up CT scan of Right hand   2. Taper off prednisone quickly  3. Pain management as needed  4. Keep Right hand elevated  5. Continue Cefazolin until work up is done     d/w Covering NP and Patient     Attending Attestation:    Spent more than 35 minutes on total encounter, more than 50 % of the visit was spent counseling and/or coordinating care by the Attending physician.

## 2022-09-29 NOTE — PROGRESS NOTE ADULT - SUBJECTIVE AND OBJECTIVE BOX
Patient is seen and examined at the bed side, is afebrile The Right hand pain and swelling continues improving. The Blood cultures from 9/28/22 have NGTD.       REVIEW OF SYSTEMS: All other review systems are negative      ALLERGIES: No Known Allergies      Vital Signs Last 24 Hrs  T(C): 36.4 (29 Sep 2022 13:57), Max: 36.9 (28 Sep 2022 20:56)  T(F): 97.6 (29 Sep 2022 13:57), Max: 98.4 (28 Sep 2022 20:56)  HR: 63 (29 Sep 2022 13:57) (61 - 76)  BP: 120/46 (29 Sep 2022 13:57) (118/53 - 129/67)  BP(mean): 70 (29 Sep 2022 05:54) (70 - 70)  RR: 17 (29 Sep 2022 13:57) (17 - 17)  SpO2: 100% (29 Sep 2022 13:57) (94% - 100%)    Parameters below as of 29 Sep 2022 13:57  Patient On (Oxygen Delivery Method): room air        PHYSICAL EXAM:  GENERAL: Not in distress   CHEST/LUNG:  Not using accessory muscles   HEART: s1 and s2 present  ABDOMEN:  Nontender and  Nondistended  EXTREMITIES: Right hand redness, tenderness and swelling is improving   CNS: Awake and Alert      LABS:                        11.6   6.52  )-----------( 175      ( 29 Sep 2022 06:57 )             36.5                           13.3   8.72  )-----------( 149      ( 28 Sep 2022 06:25 )             42.0         09-29    143  |  111<H>  |  37<H>  ----------------------------<  237<H>  3.9   |  25  |  1.18    Ca    8.5      29 Sep 2022 16:10    TPro  6.5  /  Alb  1.8<L>  /  TBili  0.1<L>  /  DBili  x   /  AST  34  /  ALT  36  /  AlkPhos  59  09-29 09-28    141  |  108  |  26<H>  ----------------------------<  163<H>  4.1   |  25  |  1.30    Ca    9.1      28 Sep 2022 06:25  Phos  2.6     09-27  Mg     1.8     09-27    TPro  7.0  /  Alb  2.2<L>  /  TBili  0.5  /  DBili  x   /  AST  21  /  ALT  25  /  AlkPhos  64  09-27  PT/INR - ( 27 Sep 2022 10:40 )   PT: 21.5 sec;   INR: 1.80 ratio          MEDICATIONS  (STANDING):    ascorbic acid 500 milliGRAM(s) Oral daily  ceFAZolin   IVPB 2000 milliGRAM(s) IV Intermittent every 8 hours  cholecalciferol 1000 Unit(s) Oral daily  glucagon  Injectable 1 milliGRAM(s) IntraMuscular once  insulin lispro (ADMELOG) corrective regimen sliding scale   SubCutaneous three times a day before meals  lidocaine   4% Patch 1 Patch Transdermal daily  losartan 100 milliGRAM(s) Oral daily  NIFEdipine XL 30 milliGRAM(s) Oral daily  OLANZapine 2.5 milliGRAM(s) Oral at bedtime  polyethylene glycol 3350 17 Gram(s) Oral daily  predniSONE   Tablet 40 milliGRAM(s) Oral every 24 hours  senna 2 Tablet(s) Oral at bedtime  simvastatin 10 milliGRAM(s) Oral at bedtime  sodium chloride 0.9%. 1000 milliLiter(s) (75 mL/Hr) IV Continuous <Continuous>  traZODone 50 milliGRAM(s) Oral at bedtime  warfarin 3 milliGRAM(s) Oral once      RADIOLOGY & ADDITIONAL TESTS:    9/27/22: Xray Hand 2 Views, Bilateral (09.27.22 @ 18:48) >  Three views of each hand demonstrate mild to moderate degenerative change with no fracture, dislocation or radiographic soft tissue abnormality.   Evaluation of the right digits limited by flexion      9/22/22: CT Head No Cont (09.22.22 @ 03:50) No acute intracranial hemorrhage or mass effect.      9/21/22: Xray Chest 1 View- PORTABLE-Urgent (Xray Chest 1 View- PORTABLE-Urgent .) (09.21.22 @ 22:40) Heart magnified by technique.    There is a slightly increased interstitial pattern in the lungs compared to August 17, 2021. Advanced shoulder degeneration and mild right   tracheal deviation at thoracic inlet again noted.        MICROBIOLOGY DATA:    Culture - Blood in AM (09.28.22 @ 06:55)   Specimen Source: .Blood Blood   Culture Results: No growth to date.     Culture - Blood in AM (09.28.22 @ 06:25)   Specimen Source: .Blood Blood   Culture Results: No growth to date.     COVID-19 PCR . (09.26.22 @ 12:27)   COVID-19 PCR: NotDetec

## 2022-09-29 NOTE — PROGRESS NOTE ADULT - PROBLEM SELECTOR PLAN 1
- B/l Hand swelling reported and noted unknown etiology Cellulitis vs. Gout vs. Septic arthritis   - B/l Hand xray c/w OA as noted above   - Unknown etiology  - ESR elevated   - Uric acid & JUVENTINO wnl  - ID-Dr. Pereyra consulted, appreciated   - C/w Prednisone for possible Gout  - C/w Cefazolin for joint infection   - CT Right hand pending-f/u results.

## 2022-09-30 ENCOUNTER — TRANSCRIPTION ENCOUNTER (OUTPATIENT)
Age: 87
End: 2022-09-30

## 2022-09-30 LAB
APTT BLD: 31.5 SEC — SIGNIFICANT CHANGE UP (ref 27.5–35.5)
GLUCOSE BLDC GLUCOMTR-MCNC: 135 MG/DL — HIGH (ref 70–99)
GLUCOSE BLDC GLUCOMTR-MCNC: 179 MG/DL — HIGH (ref 70–99)
GLUCOSE BLDC GLUCOMTR-MCNC: 189 MG/DL — HIGH (ref 70–99)
GLUCOSE BLDC GLUCOMTR-MCNC: 228 MG/DL — HIGH (ref 70–99)
GLUCOSE BLDC GLUCOMTR-MCNC: 239 MG/DL — HIGH (ref 70–99)
HCT VFR BLD CALC: 37.9 % — SIGNIFICANT CHANGE UP (ref 34.5–45)
HGB BLD-MCNC: 12 G/DL — SIGNIFICANT CHANGE UP (ref 11.5–15.5)
INR BLD: 1.81 RATIO — HIGH (ref 0.88–1.16)
MCHC RBC-ENTMCNC: 30.1 PG — SIGNIFICANT CHANGE UP (ref 27–34)
MCHC RBC-ENTMCNC: 31.7 GM/DL — LOW (ref 32–36)
MCV RBC AUTO: 95 FL — SIGNIFICANT CHANGE UP (ref 80–100)
NRBC # BLD: 0 /100 WBCS — SIGNIFICANT CHANGE UP (ref 0–0)
PLATELET # BLD AUTO: 203 K/UL — SIGNIFICANT CHANGE UP (ref 150–400)
PROTHROM AB SERPL-ACNC: 21.7 SEC — HIGH (ref 10.5–13.4)
RBC # BLD: 3.99 M/UL — SIGNIFICANT CHANGE UP (ref 3.8–5.2)
RBC # FLD: 13 % — SIGNIFICANT CHANGE UP (ref 10.3–14.5)
WBC # BLD: 6.58 K/UL — SIGNIFICANT CHANGE UP (ref 3.8–10.5)
WBC # FLD AUTO: 6.58 K/UL — SIGNIFICANT CHANGE UP (ref 3.8–10.5)

## 2022-09-30 RX ORDER — WARFARIN SODIUM 2.5 MG/1
3 TABLET ORAL ONCE
Refills: 0 | Status: COMPLETED | OUTPATIENT
Start: 2022-09-30 | End: 2022-09-30

## 2022-09-30 RX ADMIN — Medication 100 MILLIGRAM(S): at 16:27

## 2022-09-30 RX ADMIN — LIDOCAINE 1 PATCH: 4 CREAM TOPICAL at 01:04

## 2022-09-30 RX ADMIN — Medication 1000 UNIT(S): at 12:55

## 2022-09-30 RX ADMIN — Medication 50 MILLIGRAM(S): at 21:06

## 2022-09-30 RX ADMIN — SIMVASTATIN 10 MILLIGRAM(S): 20 TABLET, FILM COATED ORAL at 21:06

## 2022-09-30 RX ADMIN — LIDOCAINE 1 PATCH: 4 CREAM TOPICAL at 12:56

## 2022-09-30 RX ADMIN — Medication 40 MILLIGRAM(S): at 19:32

## 2022-09-30 RX ADMIN — Medication 2: at 17:06

## 2022-09-30 RX ADMIN — OLANZAPINE 2.5 MILLIGRAM(S): 15 TABLET, FILM COATED ORAL at 21:05

## 2022-09-30 RX ADMIN — WARFARIN SODIUM 3 MILLIGRAM(S): 2.5 TABLET ORAL at 21:05

## 2022-09-30 RX ADMIN — Medication 100 MILLIGRAM(S): at 06:06

## 2022-09-30 RX ADMIN — Medication 100 MILLIGRAM(S): at 21:06

## 2022-09-30 RX ADMIN — Medication 2: at 08:21

## 2022-09-30 RX ADMIN — LIDOCAINE 1 PATCH: 4 CREAM TOPICAL at 19:45

## 2022-09-30 RX ADMIN — Medication 500 MILLIGRAM(S): at 12:55

## 2022-09-30 NOTE — PROGRESS NOTE ADULT - PROBLEM SELECTOR PLAN 2
- p/w agitation, A&Ox0 and not following commands  - baseline alert but disoriented, follows simple commands  - CTH negative  - CXR negative   - UA, CXR negative  - TSH wnl  - C/w supportive care   - Folate wnl   - Vitamin B12 elevated ? supplementation vs. renal vs. hepatic    - C/w Zyprexa 2.5mg QHS.  Added Zyprexa 2.5mg PO Q8h PRN for anxiety/agitation   - Neuro-Dr. Olea consulted appreciated  - Telepsych-Dr. Vasquez consulted, appreciated - p/w agitation, A&Ox0 and not following commands-Improved  - baseline alert but disoriented, follows simple commands  - CTH negative  - CXR negative   - UA, CXR negative  - TSH wnl  - C/w supportive care   - Folate wnl   - Vitamin B12 elevated ? supplementation vs. renal vs. hepatic    - C/w Zyprexa 2.5mg QHS.  Added Zyprexa 2.5mg PO Q8h PRN for anxiety/agitation   - Pt. is currently oriented x 2-3, able to have conversation  - Neuro-Dr. Olea consulted appreciated  - Telepsych-Dr. Vasquez consulted, appreciated

## 2022-09-30 NOTE — PROGRESS NOTE ADULT - PROBLEM SELECTOR PROBLEM 5
HTN (hypertension)
HTN (hypertension)
Chronic deep vein thrombosis (DVT)
Chronic deep vein thrombosis (DVT)
HTN (hypertension)
HTN (hypertension)

## 2022-09-30 NOTE — PROGRESS NOTE ADULT - PROBLEM SELECTOR PROBLEM 3
Hypernatremia
CHARI (acute kidney injury)
Hypernatremia
CHARI (acute kidney injury)

## 2022-09-30 NOTE — PROGRESS NOTE ADULT - ASSESSMENT
87 yr old female, from NH, H/O dementia/HTN/HLd/.DM/DVT/OA shoulder, admit hospital for AMS/agitation/metabolic encephalopathy/CHARI/cellulitis right hand/septic joint(?), combative at NH refused eating refused meds, refused care, CT head no acute finding, COVID-19 swab negative , CHARI/hypernatremia, start IV hydration, F/U urine culture  negative ,  CXR no infiltrate, psych consult, fall precaution. DX AMS/agitation/behavior/metabolic encephalopathy/CHARI/hypernatremia. Still combative refused eat pulled IV line, refused meds refused care, Zyprexa IM prn for agitation, zyprexa 2.5 mg po HS, more calm, fall precaution,  COVID-19 negative. Advance care discussed, full code. Still  spiked fever  101F, pan culture, right hand/finger swelling/warm on touch,  septic vs inflammatory arthritis(?), pan culture CT scan  right hand, resident refused CT scan, ESR/JUVENTINO/CRP,   continue IV ABS, steroid,  9/26 exposure to COVID, swab negative 9/26, D/C planning ABS course per ID, taper steroid, fall precaution

## 2022-09-30 NOTE — PROGRESS NOTE ADULT - PROBLEM SELECTOR PROBLEM 6
HLD (hyperlipidemia)
HTN (hypertension)
HLD (hyperlipidemia)
HTN (hypertension)

## 2022-09-30 NOTE — PROGRESS NOTE ADULT - PROBLEM SELECTOR PLAN 10
- C/w Coumadin for DVT ppx
- C/w Coumadin for DVT ppx
PMH constipation  - start bowel regimen as needed
- B/l Hand swelling reported and noted unknown etiology Cellulitis vs. Gout vs. Septic arthritis. If Septic arthritis will need PICC/Extended dwell for at least 4wks of abx.
- B/l Hand swelling reported and noted unknown etiology Cellulitis vs. Gout vs. Septic arthritis. If Septic arthritis will need PICC/Extended dwell for at least 4wks of abx.

## 2022-09-30 NOTE — PROGRESS NOTE ADULT - PROBLEM SELECTOR PROBLEM 7
Type 2 diabetes mellitus
No
HLD (hyperlipidemia)
Type 2 diabetes mellitus
HLD (hyperlipidemia)

## 2022-09-30 NOTE — PROGRESS NOTE ADULT - ASSESSMENT
87 year old female, from [facility], with PMH of Dementia, HTN, HLD, DM, DVT (on Coumadin), & Osteoarthriris-shoulder.   Presented to the ED for refusal to eating,  refusing meds & care.  Admitted for Toxic Metabolic Encephalopathy with agitation and combativeness, CHARI, and Hypernatremia.     CT head no acute finding.  COVID-19 swab negative.  Urine culture negative, and CXR no infiltrate.    Combative refused eat pulled IV line, refused meds refused care.  Started Zyprexa IM PRN for agitation & Zyprexa 2.5mg po QHS.  DC'd IM.  Per Psych on Zyprexa 2.5mg PO Q8h PRN for anxiety/agitation.      Hospital course complicated by fevers 100.4.      Pysch consult appreciated.  Pt has been calm and cooperative since addition of Zyprexa.      9/29-Pt. is calm and cooperative, reports pain in B/L hands R>L, fingers clenched to closed fist, able to open only partially.  X-Ray b/l hands shows Mild to moderate OA with no acute radiographic findings, awaiting right hand CT to R/O septic arthritis.  (pt.'s am BMP results suspicious for mistake.  repeat BMP acceptable).  9/30-Pt. declined CT of hands yesterday.

## 2022-09-30 NOTE — PROGRESS NOTE ADULT - PROBLEM SELECTOR PLAN 4
- Home med Coumadin 2mg QHS   - INR 1.55 today (9/29)  - Coumadin 3mg tonight    - f/u am PT/INR - Home med Coumadin 2mg QHS   - INR 1.81 today (9/30)  - Coumadin 3mg tonight    - f/u am PT/INR

## 2022-09-30 NOTE — DISCHARGE NOTE PROVIDER - HOSPITAL COURSE
Pt is a 87F with a PMH dementia, HTN, HLD, T2DM, chronic DVT, OA, restless leg syndrome, constipation, frequent UTIs, and insomnia who presented from [facility] at [facility] for acute toxic metabolic encephalopathy and  hypernatremia. Hypernatremia resolved with IVF.     CT head no acute finding.  COVID-19 swab negative.  Urine culture negative, and CXR no infiltrate.    Pt was combative to care at times, seen by psych and adjusted meds which made her calm and cooperative.     Then hospital course complicated by fevers 100.4 with pain in hands.  X-Ray b/l hands shows Mild to moderate OA with no acute radiographic findings, tried to get CT of hand to r/o septic arthritis however, unable to obtain CT of hand due to refusal. ID consulted, IV cefazolin started and will d/c maegan Augmentin until 10/5/2022 and will taper off steroids . Advised to have a f/u with Rheumatology outpatient for further work up of Gout    Pt is medically stable for discharge back to [facility], however, pt has billing issue at the facility CM is following up on that.     Given patient's improved clinical status and current hemodynamic stability decision was made to discharge. Pt is stable for discharge per attending and is advised to f/u with PCP as out-patient. Please refer to Pt's complete medical chart with documents for a full hospital course, for this is only a brief summary. 87 year old F with PMH of Dementia, HTN, HLD, T2DM, chronic DVT, OA, restless leg syndrome, constipation, frequent UTIs, and insomnia who presented from [place] at [facility] for Acute Toxic Metabolic Encephalopathy and Hypernatremia. Hypernatremia resolved with IVF.     CT head no acute finding.  COVID-19 swab negative.  Urine culture negative, and CXR no infiltrate.    Patient was initially combative to care at times.  However, seen by Behavioral Health and medication adjustments were made which made patient calm and cooperative.     Then hospital course complicated by fevers 100.4 with pain in hands.  X-Ray b/l hands showed mild to moderate OA with no acute radiographic findings.  Attempted to obtain recommended CT of hand to r/o septic arthritis.  However, unable to obtain CT of hand due to patient refusal.  ID consulted for hand swelling.  ID recommended IV Cefazolin and PO Prednisone for Cellulitis vs. Gout.  Upon discharge patient will continue Augmentin until 10/5/2022 and will taper off steroids.  Patient has been advised to have a follow up with Rheumatology outpatient for further work up of Gout.    Patient medically stable for discharge back to [place].      THIS IS A BRIEF SUMMARY OF EVENTS.  FOR A FULL HOSPITAL COURSE SEE CHART. 87 year old Female with PMH of Dementia, HTN, HLD, T2DM, chronic DVT, OA, restless leg syndrome, constipation, frequent UTIs, and insomnia who presented from [place] at [facility] for Acute Toxic Metabolic Encephalopathy and Hypernatremia. Hypernatremia resolved with IVF.     CT head no acute finding.  COVID-19 swab negative.  Urine culture negative, and CXR no infiltrate.    Patient was initially combative to care at times.  However, seen by Behavioral Health and medication adjustments were made which made patient calm and cooperative.     Then hospital course complicated by fevers 100.4 with pain in hands.  X-Ray b/l hands showed mild to moderate OA with no acute radiographic findings.  Attempted to obtain recommended CT of hand to r/o septic arthritis.  However, unable to obtain CT of hand due to patient refusal.  ID consulted for hand swelling.  ID recommended IV Cefazolin and PO Prednisone for Cellulitis vs. Gout.  Upon discharge patient will continue Augmentin until 10/5/2022 and will taper off steroids.  Patient has been advised to have a follow up with Rheumatology outpatient for further work up of Gout.    Patient medically stable for discharge back to [place].      THIS IS A BRIEF SUMMARY OF EVENTS.  FOR A FULL HOSPITAL COURSE SEE CHART.

## 2022-09-30 NOTE — PROGRESS NOTE ADULT - PROBLEM SELECTOR PROBLEM 2
Toxic metabolic encephalopathy
Hypernatremia
Hypernatremia
Toxic metabolic encephalopathy

## 2022-09-30 NOTE — DISCHARGE NOTE PROVIDER - CARE PROVIDER_API CALL
Yaa Jhaveri  PMD in 1 week  Phone: (   )    -  Fax: (   )    -  [facility]:     [facility]  [address] [place] [address]  Phone: [phone]  Fax: [phone]  Follow Up Time: 2 weeks

## 2022-09-30 NOTE — PROGRESS NOTE ADULT - SUBJECTIVE AND OBJECTIVE BOX
Patient is a 87y old  Female who presents with a chief complaint of Altered Mental Status (29 Sep 2022 13:11)/metabolic encephalopathy/CHARI/cellulitis right hand/septic joint(?)      INTERVAL HPI/OVERNIGHT EVENTS:  T(C): 36.7 (09-30-22 @ 05:35), Max: 37.4 (09-29-22 @ 20:13)  HR: 60 (09-30-22 @ 05:35) (60 - 75)  BP: 128/47 (09-30-22 @ 05:35) (120/46 - 150/52)  RR: 17 (09-30-22 @ 05:35) (17 - 17)  SpO2: 100% (09-30-22 @ 05:35) (96% - 100%)  Wt(kg): --    LABS:                        12.0   6.58  )-----------( 203      ( 30 Sep 2022 06:00 )             37.9     09-29    143  |  111<H>  |  37<H>  ----------------------------<  237<H>  3.9   |  25  |  1.18    Ca    8.5      29 Sep 2022 16:10    TPro  6.5  /  Alb  1.8<L>  /  TBili  0.1<L>  /  DBili  x   /  AST  34  /  ALT  36  /  AlkPhos  59  09-29    PT/INR - ( 30 Sep 2022 06:00 )   PT: 21.7 sec;   INR: 1.81 ratio         PTT - ( 30 Sep 2022 06:00 )  PTT:31.5 sec    CAPILLARY BLOOD GLUCOSE      POCT Blood Glucose.: 239 mg/dL (30 Sep 2022 07:57)  POCT Blood Glucose.: 235 mg/dL (29 Sep 2022 20:56)  POCT Blood Glucose.: 227 mg/dL (29 Sep 2022 16:48)  POCT Blood Glucose.: 187 mg/dL (29 Sep 2022 12:43)  POCT Blood Glucose.: 237 mg/dL (29 Sep 2022 11:16)        RADIOLOGY & ADDITIONAL TESTS:    Consultant(s) Notes Reviewed:  [x ] YES  [ ] NO    PHYSICAL EXAM:  GENERAL: well built, well nourished  HEAD:  Atraumatic, Normocephalic  EYES: EOMI, PERRLA, conjunctiva and sclera clear  ENT: No tonsillar erythema, exudates, or enlargement; Moist mucous membranes, Good dentition, No lesions  NECK: Supple, No JVD, Normal thyroid, no enlarged nodes  NERVOUS SYSTEM:  Awake calm  CHEST/LUNG: B/L good air entry; No rales, rhonchi, or wheezing  HEART: S1S2 normal, no S3, Regular rate and rhythm; No murmurs, rubs, or gallops  ABDOMEN: Soft, Nontender, Nondistended; Bowel sounds present  EXTREMITIES:  2+ Peripheral Pulses, No clubbing, cyanosis, right hand swelling/warm on touch  LYMPH: No lymphadenopathy noted  SKIN: No rashes or lesions    Care Discussed with Consultants/Other Providers [ x] YES  [ ] NO

## 2022-09-30 NOTE — PROGRESS NOTE ADULT - SUBJECTIVE AND OBJECTIVE BOX
Patient is seen and examined at the bed side, is afebrile. Patient is refusing CT scan of Right hand. The WBC count us normal.       REVIEW OF SYSTEMS: All other review systems are negative      ALLERGIES: No Known Allergies      Vital Signs Last 24 Hrs  T(C): 36.6 (30 Sep 2022 12:54), Max: 37.4 (29 Sep 2022 20:13)  T(F): 97.8 (30 Sep 2022 12:54), Max: 99.4 (29 Sep 2022 20:13)  HR: 63 (30 Sep 2022 12:54) (60 - 75)  BP: 125/49 (30 Sep 2022 12:54) (125/49 - 150/52)  BP(mean): --  RR: 17 (30 Sep 2022 12:54) (17 - 17)  SpO2: 100% (30 Sep 2022 12:54) (96% - 100%)    Parameters below as of 30 Sep 2022 12:54  Patient On (Oxygen Delivery Method): room air      PHYSICAL EXAM:  GENERAL: Not in distress   CHEST/LUNG:  Not using accessory muscles   HEART: s1 and s2 present  ABDOMEN:  Nontender and  Nondistended  EXTREMITIES: Right hand redness, tenderness and swelling is improving   CNS: Awake and Alert      LABS:                        12.0   6.58  )-----------( 203      ( 30 Sep 2022 06:00 )             37.9                           11.6   6.52  )-----------( 175      ( 29 Sep 2022 06:57 )             36.5         09-29    143  |  111<H>  |  37<H>  ----------------------------<  237<H>  3.9   |  25  |  1.18    Ca    8.5      29 Sep 2022 16:10    TPro  6.5  /  Alb  1.8<L>  /  TBili  0.1<L>  /  DBili  x   /  AST  34  /  ALT  36  /  AlkPhos  59  09-29 09-28    141  |  108  |  26<H>  ----------------------------<  163<H>  4.1   |  25  |  1.30    Ca    9.1      28 Sep 2022 06:25  Phos  2.6     09-27  Mg     1.8     09-27    TPro  7.0  /  Alb  2.2<L>  /  TBili  0.5  /  DBili  x   /  AST  21  /  ALT  25  /  AlkPhos  64  09-27  PT/INR - ( 27 Sep 2022 10:40 )   PT: 21.5 sec;   INR: 1.80 ratio          MEDICATIONS  (STANDING):    ascorbic acid 500 milliGRAM(s) Oral daily  ceFAZolin   IVPB 2000 milliGRAM(s) IV Intermittent every 8 hours  cholecalciferol 1000 Unit(s) Oral daily  glucagon  Injectable 1 milliGRAM(s) IntraMuscular once  insulin lispro (ADMELOG) corrective regimen sliding scale   SubCutaneous three times a day before meals  insulin lispro (ADMELOG) corrective regimen sliding scale   SubCutaneous at bedtime  lidocaine   4% Patch 1 Patch Transdermal daily  losartan 100 milliGRAM(s) Oral daily  NIFEdipine XL 30 milliGRAM(s) Oral daily  OLANZapine 2.5 milliGRAM(s) Oral at bedtime  polyethylene glycol 3350 17 Gram(s) Oral daily  predniSONE   Tablet 40 milliGRAM(s) Oral every 24 hours  senna 2 Tablet(s) Oral at bedtime  simvastatin 10 milliGRAM(s) Oral at bedtime  sodium chloride 0.9%. 1000 milliLiter(s) (75 mL/Hr) IV Continuous <Continuous>  traZODone 50 milliGRAM(s) Oral at bedtime  warfarin 3 milliGRAM(s) Oral once      RADIOLOGY & ADDITIONAL TESTS:    9/27/22: Xray Hand 2 Views, Bilateral (09.27.22 @ 18:48) >  Three views of each hand demonstrate mild to moderate degenerative change with no fracture, dislocation or radiographic soft tissue abnormality.   Evaluation of the right digits limited by flexion      9/22/22: CT Head No Cont (09.22.22 @ 03:50) No acute intracranial hemorrhage or mass effect.      9/21/22: Xray Chest 1 View- PORTABLE-Urgent (Xray Chest 1 View- PORTABLE-Urgent .) (09.21.22 @ 22:40) Heart magnified by technique.    There is a slightly increased interstitial pattern in the lungs compared to August 17, 2021. Advanced shoulder degeneration and mild right   tracheal deviation at thoracic inlet again noted.        MICROBIOLOGY DATA:    Culture - Blood in AM (09.28.22 @ 06:55)   Specimen Source: .Blood Blood   Culture Results: No growth to date.     Culture - Blood in AM (09.28.22 @ 06:25)   Specimen Source: .Blood Blood   Culture Results: No growth to date.     COVID-19 PCR . (09.26.22 @ 12:27)   COVID-19 PCR: NotDetec

## 2022-09-30 NOTE — DISCHARGE NOTE PROVIDER - NSDCCPCAREPLAN_GEN_ALL_CORE_FT
PRINCIPAL DISCHARGE DIAGNOSIS  Diagnosis: Toxic metabolic encephalopathy  Assessment and Plan of Treatment: You were admitted with acute encephalopathy likely due to hypernatremia and inflammation in your hands .   Your mentation back to baseline with treatment.   Encephalopathy is a term used to describe brain disease or brain damage. It usually develops because of a health condition such as cirrhosis, or a brain injury. Symptoms may be mild or severe, and may be short-term or permanent.  This was most likely due to an elevated carbon dioxide level in your blood.   Your levels are now back to normal and you are back to your normal self.        SECONDARY DISCHARGE DIAGNOSES  Diagnosis: OA (osteoarthritis)  Assessment and Plan of Treatment: rt hand per xray - continue pain medication and occupation therapy as needed   Please have follow up with rheumatologist as out- patient for further work up for gout / gout arthritis / septic arthritis    Diagnosis: Hypernatremia  Assessment and Plan of Treatment: resolved with IV fluids   monitor blood work in 1 week    Diagnosis: Septic arthritis  Assessment and Plan of Treatment: vs gout   as you refused to have CT of hand, we treated you based on your clinical presentation   this is recommendations from infectious disease specialist  1.continue to take Oral Augmentin 875mg [id number] on discharge to continue until 10/5/22  2. Taper off prednisone quickly - 20mg once a day  x 2 days then take 10mg once daily x 2 days then 5mg once daily x 2 days   3. Pain management as needed  4. Keep Right hand elevated  5. Rheumatology outpatient follow up for further work up of Gout       PRINCIPAL DISCHARGE DIAGNOSIS  Diagnosis: Toxic metabolic encephalopathy  Assessment and Plan of Treatment: You were admitted with acute encephalopathy likely due to hypernatremia and inflammation in your hands .   Your mentation back to baseline with treatment.   Encephalopathy is a term used to describe brain disease or brain damage. It usually develops because of a health condition such as cirrhosis, or a brain injury. Symptoms may be mild or severe, and may be short-term or permanent.  This was most likely due to an elevated carbon dioxide level in your blood.   Your levels are now back to normal and you are back to your normal self.        SECONDARY DISCHARGE DIAGNOSES  Diagnosis: Hypernatremia  Assessment and Plan of Treatment: resolved with IV fluids   monitor blood work in 1 week    Diagnosis: OA (osteoarthritis)  Assessment and Plan of Treatment: rt hand per xray - continue pain medication and occupation therapy as needed   Please have follow up with rheumatologist as out- patient for further work up for gout / gout arthritis / septic arthritis    Diagnosis: Septic arthritis  Assessment and Plan of Treatment: vs gout   as you refused to have CT of hand, we treated you based on your clinical presentation   this is recommendations from infectious disease specialist  1.continue to take Oral Augmentin 875mg [id number] on discharge to continue until 10/5/22  2. Taper off prednisone quickly - 20mg once a day  x 2 days then take 10mg once daily x 2 days then 5mg once daily x 2 days   3. Pain management as needed  4. Keep Right hand elevated  5. Rheumatology outpatient follow up for further work up of Gout

## 2022-09-30 NOTE — PROGRESS NOTE ADULT - PROBLEM SELECTOR PROBLEM 4
Chronic deep vein thrombosis (DVT)
CHARI (acute kidney injury)
Chronic deep vein thrombosis (DVT)
Chronic deep vein thrombosis (DVT)
CHARI (acute kidney injury)
Chronic deep vein thrombosis (DVT)

## 2022-09-30 NOTE — PROGRESS NOTE ADULT - SUBJECTIVE AND OBJECTIVE BOX
NP Note discussed with  Primary Attending    Patient is a 87y old  Female who presents with a chief complaint of Altered Mental Status (30 Sep 2022 09:52)      INTERVAL HPI/OVERNIGHT EVENTS: no new complaints    MEDICATIONS  (STANDING):  ascorbic acid 500 milliGRAM(s) Oral daily  ceFAZolin   IVPB 2000 milliGRAM(s) IV Intermittent every 8 hours  cholecalciferol 1000 Unit(s) Oral daily  dextrose 5%. 1000 milliLiter(s) (50 mL/Hr) IV Continuous <Continuous>  dextrose 5%. 1000 milliLiter(s) (100 mL/Hr) IV Continuous <Continuous>  dextrose 50% Injectable 25 Gram(s) IV Push once  dextrose 50% Injectable 12.5 Gram(s) IV Push once  dextrose 50% Injectable 25 Gram(s) IV Push once  glucagon  Injectable 1 milliGRAM(s) IntraMuscular once  insulin lispro (ADMELOG) corrective regimen sliding scale   SubCutaneous three times a day before meals  insulin lispro (ADMELOG) corrective regimen sliding scale   SubCutaneous at bedtime  lidocaine   4% Patch 1 Patch Transdermal daily  losartan 100 milliGRAM(s) Oral daily  NIFEdipine XL 30 milliGRAM(s) Oral daily  OLANZapine 2.5 milliGRAM(s) Oral at bedtime  polyethylene glycol 3350 17 Gram(s) Oral daily  predniSONE   Tablet 40 milliGRAM(s) Oral every 24 hours  senna 2 Tablet(s) Oral at bedtime  simvastatin 10 milliGRAM(s) Oral at bedtime  sodium chloride 0.9%. 1000 milliLiter(s) (75 mL/Hr) IV Continuous <Continuous>  traZODone 50 milliGRAM(s) Oral at bedtime    MEDICATIONS  (PRN):  acetaminophen     Tablet .. 650 milliGRAM(s) Oral every 6 hours PRN Temp greater or equal to 38C (100.4F), Mild Pain (1 - 3), Moderate Pain (4 - 6)  dextrose Oral Gel 15 Gram(s) Oral once PRN Blood Glucose LESS THAN 70 milliGRAM(s)/deciliter  OLANZapine 2.5 milliGRAM(s) Oral every 8 hours PRN Anxiety/agitation      __________________________________________________  REVIEW OF SYSTEMS:    CONSTITUTIONAL: No fever,   EYES: no acute visual disturbances  NECK: No pain or stiffness  RESPIRATORY: No cough; No shortness of breath  CARDIOVASCULAR: No chest pain, no palpitations  GASTROINTESTINAL: No pain. No nausea or vomiting; No diarrhea   NEUROLOGICAL: No headache or numbness, no tremors  MUSCULOSKELETAL: No joint pain, no muscle pain  GENITOURINARY: no dysuria, no frequency, no hesitancy  PSYCHIATRY: no depression , no anxiety  ALL OTHER  ROS negative        Vital Signs Last 24 Hrs  T(C): 36.7 (30 Sep 2022 05:35), Max: 37.4 (29 Sep 2022 20:13)  T(F): 98.1 (30 Sep 2022 05:35), Max: 99.4 (29 Sep 2022 20:13)  HR: 60 (30 Sep 2022 05:35) (60 - 75)  BP: 128/47 (30 Sep 2022 05:35) (120/46 - 150/52)  BP(mean): --  RR: 17 (30 Sep 2022 05:35) (17 - 17)  SpO2: 100% (30 Sep 2022 05:35) (96% - 100%)    Parameters below as of 30 Sep 2022 05:35  Patient On (Oxygen Delivery Method): room air        ________________________________________________  PHYSICAL EXAM: well developed  GENERAL: NAD  HEENT: Normocephalic;  conjunctivae and sclerae clear; moist mucous membranes;   NECK : supple  CHEST/LUNG: Clear to auscultation bilaterally with good air entry   HEART: S1 S2  regular; no murmurs, gallops or rubs  ABDOMEN: Soft, Nontender, Nondistended; Bowel sounds present  EXTREMITIES: B/L hands swollen and red, no cyanosis; no edema; no calf tenderness  SKIN: warm and dry; no rash  NERVOUS SYSTEM:  Awake and alert; Oriented  x1-2    _________________________________________________  LABS:                        12.0   6.58  )-----------( 203      ( 30 Sep 2022 06:00 )             37.9     09-29    143  |  111<H>  |  37<H>  ----------------------------<  237<H>  3.9   |  25  |  1.18    Ca    8.5      29 Sep 2022 16:10    TPro  6.5  /  Alb  1.8<L>  /  TBili  0.1<L>  /  DBili  x   /  AST  34  /  ALT  36  /  AlkPhos  59  09-29    PT/INR - ( 30 Sep 2022 06:00 )   PT: 21.7 sec;   INR: 1.81 ratio         PTT - ( 30 Sep 2022 06:00 )  PTT:31.5 sec    CAPILLARY BLOOD GLUCOSE      POCT Blood Glucose.: 239 mg/dL (30 Sep 2022 07:57)  POCT Blood Glucose.: 235 mg/dL (29 Sep 2022 20:56)  POCT Blood Glucose.: 227 mg/dL (29 Sep 2022 16:48)  POCT Blood Glucose.: 187 mg/dL (29 Sep 2022 12:43)  POCT Blood Glucose.: 237 mg/dL (29 Sep 2022 11:16)    RADIOLOGY & ADDITIONAL TESTS:    < from: Xray Hand 2 Views, Bilateral (09.27.22 @ 18:48) >  ACC: [id number] EXAM:  XR HAND 2 VIEWS BI                          PROCEDURE DATE:  09/27/2022          INTERPRETATION:  Clinical history: 87-year-old female, hand swelling.    Three views of each hand demonstrate mild to moderate degenerative change  with no fracture, dislocation or radiographic soft tissue abnormality.   Evaluation of the right digits limited by flexion    IMPRESSION:  Mild to moderate OA with no acute radiographic findings    --- End of Report ---    < end of copied text >    < from: Xray Chest 1 View- PORTABLE-Routine (Xray Chest 1 View- PORTABLE-Routine .) (09.25.22 @ 18:19) >    ACC: [id number] EXAM:  XR CHEST PORTABLE ROUTINE 1V                          PROCEDURE DATE:  09/25/2022          INTERPRETATION:  INDICATION: Change in mental status possible pneumonia    COMPARISON: 9/21/2022    FINDINGS:  Heart/Vascular: The heartsize, mediastinum, hilum and aorta are within   normal limits for projection.  Pulmonary: Midline trachea. There is no focal infiltrate, congestion or   effusion.    Bones: There is no fracture. Advanced degenerative changes of the   shoulders with rotator cuff arthropathy. Degenerative changes of the   spine.  Lines and catheter: None    Impression:    No acute pulmonary disease.    --- End of Report ---    < end of copied text >    Imaging Personally Reviewed:  YES/NO    Consultant(s) Notes Reviewed:   YES/ No    Care Discussed with Consultants :     Plan of care was discussed with patient and /or primary care giver; all questions and concerns were addressed and care was aligned with patient's wishes.

## 2022-09-30 NOTE — DISCHARGE NOTE PROVIDER - PROVIDER TOKENS
FREE:[LAST:[Emilio],FIRST:[Yaa],PHONE:[(   )    -],FAX:[(   )    -],ADDRESS:[PMD in 1 week]],PROVIDER:[TOKEN:[[id number]],FOLLOWUP:[2 weeks]]

## 2022-09-30 NOTE — PROGRESS NOTE ADULT - PROBLEM SELECTOR PROBLEM 1
Swelling of both hands
Toxic metabolic encephalopathy
Swelling of both hands
Toxic metabolic encephalopathy

## 2022-09-30 NOTE — DISCHARGE NOTE PROVIDER - NSDCMRMEDTOKEN_GEN_ALL_CORE_FT
losartan 100 mg oral tablet: 1 tab(s) orally once a day  multivitamin: 1 tab(s) orally once a day  NIFEdipine 30 mg oral tablet, extended release: 1 tab(s) orally once a day  simvastatin 10 mg oral tablet: 1 tab(s) orally once a day (at bedtime)  Systane Complete Preservative Free Dry Eye Relief ophthalmic solution: 1 drop(s) to each affected eye 4 times a day  traZODone 50 mg oral tablet: 1 tab(s) orally once a day (at bedtime)  Vitamin C 500 mg oral tablet: 1 tab(s) orally once a day  warfarin 2 mg oral tablet: 1 tab(s) orally once a day (at bedtime)   Augmentin XR: 875 milligram(s) orally 2 times a day X 2 DAYS.   insulin lispro 100 units/mL injectable solution: 1 unit(s) injectable 3 times a day (before meals) HOSPITAL SLIDING SCALE:  1 Unit(s) if Glucose 151-200  2 Unit(s) if Glucose 201-250  3 Unit(s) if Glucose 251-300  4 Unit(s) if Glucose 301-350  5 Unit(s) if Glucose 351-400  6 Unit(s) if Glucose greater than 400 NOTIFY PROVIDER  lidocaine 4% topical film: Apply topically to affected area once a day  losartan 100 mg oral tablet: 1 tab(s) orally once a day  multivitamin: 1 tab(s) orally once a day  NIFEdipine 30 mg oral tablet, extended release: 1 tab(s) orally once a day  OLANZapine 2.5 mg oral tablet: 1 tab(s) orally once a day (at bedtime)  OLANZapine 2.5 mg oral tablet: 1 tab(s) orally every 8 hours, As needed, Anxiety/agitation  polyethylene glycol 3350 oral powder for reconstitution: 17 gram(s) orally once a day  predniSONE 10 mg oral tablet: 1 tab(s) orally once a day Continue to Taper:  10mg (1 tab) x 2 days  5mg (0.5 tab) x 2 days   Then STOP  senna leaf extract oral tablet: 2 tab(s) orally once a day (at bedtime)  simvastatin 10 mg oral tablet: 1 tab(s) orally once a day (at bedtime)  Systane Complete Preservative Free Dry Eye Relief ophthalmic solution: 1 drop(s) to each affected eye 4 times a day  traZODone 50 mg oral tablet: 1 tab(s) orally once a day (at bedtime)  Vitamin C 500 mg oral tablet: 1 tab(s) orally once a day  warfarin 2 mg oral tablet: 1 tab(s) orally once a day (at bedtime)

## 2022-09-30 NOTE — DISCHARGE NOTE PROVIDER - NSDCFUADDINST_GEN_ALL_CORE_FT
On 10/3 your last INR=1.8.  You were given Coumadin 3mg on 10/3/22.  You should resume your regular dose of Coumadin 2mg tonight on 10/3/22.  Continue to have your INR monitored daily.

## 2022-09-30 NOTE — PROGRESS NOTE ADULT - ASSESSMENT
Patient is a 87y old  Female with PMH of dementia ,  HTN, HLD, T2DM, chronic DVT, OA, restless leg syndrome, constipation, frequent UTIs, and insomnia who has been sent in to the ER on 9/22/22 from [place] at [facility] for evaluation of altered mental status. and agitation uncharacteristic of her baseline. In ED pt was administered 2.5mg haldol and 2.5mg olanzapine for agitation. The CTH negative for acute infarct, UA negative, and CXR WNL. On day 3 of hospitalization , 9/25/22, she start having fever but no blood cultures noted in the system, also developed Right hand swelling. Today, 9/27/22, The ID consult requested to assist with further evaluation and antibiotic management.     # Fever - R/O Infectious etiology - Blood cultures from 9/28 have NGTD  # Right hand cellulitis vs septic arthritis vs Gout with superimposed infection - improving - patient refusing CT scan of Right hand   # Metabolic encephalopathy     would recommend:    1. May change Cefazolin to Oral Augmentin 875mg [id number] on discharge to continue until 10/5/22  2. Taper off prednisone quickly  3. Pain management as needed  4. Keep Right hand elevated  5. Rheumatology outpatient follow up for further work up of Gout    d/w Covering NP, Dr. Jose Elias Smith, and Patient     Attending Attestation:    Spent more than 35 minutes on total encounter, more than 50 % of the visit was spent counseling and/or coordinating care by the Attending physician.

## 2022-10-01 LAB
ANION GAP SERPL CALC-SCNC: 6 MMOL/L — SIGNIFICANT CHANGE UP (ref 5–17)
BUN SERPL-MCNC: 29 MG/DL — HIGH (ref 7–18)
CALCIUM SERPL-MCNC: 9.1 MG/DL — SIGNIFICANT CHANGE UP (ref 8.4–10.5)
CHLORIDE SERPL-SCNC: 108 MMOL/L — SIGNIFICANT CHANGE UP (ref 96–108)
CO2 SERPL-SCNC: 27 MMOL/L — SIGNIFICANT CHANGE UP (ref 22–31)
CREAT SERPL-MCNC: 1.1 MG/DL — SIGNIFICANT CHANGE UP (ref 0.5–1.3)
EGFR: 49 ML/MIN/1.73M2 — LOW
GLUCOSE BLDC GLUCOMTR-MCNC: 118 MG/DL — HIGH (ref 70–99)
GLUCOSE BLDC GLUCOMTR-MCNC: 163 MG/DL — HIGH (ref 70–99)
GLUCOSE BLDC GLUCOMTR-MCNC: 189 MG/DL — HIGH (ref 70–99)
GLUCOSE BLDC GLUCOMTR-MCNC: 210 MG/DL — HIGH (ref 70–99)
GLUCOSE BLDC GLUCOMTR-MCNC: 213 MG/DL — HIGH (ref 70–99)
GLUCOSE SERPL-MCNC: 244 MG/DL — HIGH (ref 70–99)
HCT VFR BLD CALC: 35.3 % — SIGNIFICANT CHANGE UP (ref 34.5–45)
HGB BLD-MCNC: 11 G/DL — LOW (ref 11.5–15.5)
INR BLD: 2.37 RATIO — HIGH (ref 0.88–1.16)
MCHC RBC-ENTMCNC: 29.9 PG — SIGNIFICANT CHANGE UP (ref 27–34)
MCHC RBC-ENTMCNC: 31.2 GM/DL — LOW (ref 32–36)
MCV RBC AUTO: 95.9 FL — SIGNIFICANT CHANGE UP (ref 80–100)
NRBC # BLD: 0 /100 WBCS — SIGNIFICANT CHANGE UP (ref 0–0)
PLATELET # BLD AUTO: 218 K/UL — SIGNIFICANT CHANGE UP (ref 150–400)
POTASSIUM SERPL-MCNC: 4.7 MMOL/L — SIGNIFICANT CHANGE UP (ref 3.5–5.3)
POTASSIUM SERPL-SCNC: 4.7 MMOL/L — SIGNIFICANT CHANGE UP (ref 3.5–5.3)
PROTHROM AB SERPL-ACNC: 28.5 SEC — HIGH (ref 10.5–13.4)
RBC # BLD: 3.68 M/UL — LOW (ref 3.8–5.2)
RBC # FLD: 13 % — SIGNIFICANT CHANGE UP (ref 10.3–14.5)
SODIUM SERPL-SCNC: 141 MMOL/L — SIGNIFICANT CHANGE UP (ref 135–145)
WBC # BLD: 5.87 K/UL — SIGNIFICANT CHANGE UP (ref 3.8–10.5)
WBC # FLD AUTO: 5.87 K/UL — SIGNIFICANT CHANGE UP (ref 3.8–10.5)

## 2022-10-01 RX ADMIN — Medication 1000 UNIT(S): at 13:33

## 2022-10-01 RX ADMIN — Medication 1: at 13:40

## 2022-10-01 RX ADMIN — SIMVASTATIN 10 MILLIGRAM(S): 20 TABLET, FILM COATED ORAL at 21:59

## 2022-10-01 RX ADMIN — Medication 2: at 08:24

## 2022-10-01 RX ADMIN — Medication 20 MILLIGRAM(S): at 18:42

## 2022-10-01 RX ADMIN — LIDOCAINE 1 PATCH: 4 CREAM TOPICAL at 00:12

## 2022-10-01 RX ADMIN — OLANZAPINE 2.5 MILLIGRAM(S): 15 TABLET, FILM COATED ORAL at 21:59

## 2022-10-01 RX ADMIN — Medication 100 MILLIGRAM(S): at 05:17

## 2022-10-01 RX ADMIN — POLYETHYLENE GLYCOL 3350 17 GRAM(S): 17 POWDER, FOR SOLUTION ORAL at 13:47

## 2022-10-01 RX ADMIN — Medication 100 MILLIGRAM(S): at 22:00

## 2022-10-01 RX ADMIN — SENNA PLUS 2 TABLET(S): 8.6 TABLET ORAL at 21:59

## 2022-10-01 RX ADMIN — Medication 100 MILLIGRAM(S): at 13:39

## 2022-10-01 RX ADMIN — Medication 500 MILLIGRAM(S): at 13:33

## 2022-10-01 RX ADMIN — Medication 50 MILLIGRAM(S): at 22:00

## 2022-10-01 NOTE — PROGRESS NOTE ADULT - ASSESSMENT
Patient is a 87y old  Female with PMH of dementia ,  HTN, HLD, T2DM, chronic DVT, OA, restless leg syndrome, constipation, frequent UTIs, and insomnia who has been sent in to the ER on 9/22/22 from [place] at [facility] for evaluation of altered mental status. and agitation uncharacteristic of her baseline. In ED pt was administered 2.5mg haldol and 2.5mg olanzapine for agitation. The CTH negative for acute infarct, UA negative, and CXR WNL. On day 3 of hospitalization , 9/25/22, she start having fever but no blood cultures noted in the system, also developed Right hand swelling. Today, 9/27/22, The ID consult requested to assist with further evaluation and antibiotic management.     # Fever - R/O Infectious etiology - Blood cultures from 9/28 have NGTD  # Right hand cellulitis vs septic arthritis  ( Less likely)  vs Gout with superimposed infection - improving - patient refusing CT scan of Right hand   # Metabolic encephalopathy - Resolving     would recommend:    1. Taper off prednisone quickly  2. May change Cefazolin to Oral Augmentin 875mg [id number] on discharge to continue until 10/5/22  3. Pain management as needed  4. Keep Right hand elevated  5. Rheumatology outpatient follow up for further work up of Gout    d/w Covering NPJames    Attending Attestation:    Spent more than 35 minutes on total encounter, more than 50 % of the visit was spent counseling and/or coordinating care by the Attending physician.

## 2022-10-01 NOTE — PROGRESS NOTE ADULT - SUBJECTIVE AND OBJECTIVE BOX
Patient is a 87y old  Female who presents with a chief complaint of Altered Mental Status (01 Oct 2022 12:39)/metabolic encephalopathy/cellulitis right hand/septic joint(?), stable pending D/C NH      INTERVAL HPI/OVERNIGHT EVENTS:  T(C): 36.7 (10-01-22 @ 13:53), Max: 36.9 (10-01-22 @ 04:25)  HR: 56 (10-01-22 @ 13:53) (56 - 75)  BP: 144/46 (10-01-22 @ 13:53) (118/50 - 144/46)  RR: 18 (10-01-22 @ 13:53) (18 - 18)  SpO2: 98% (10-01-22 @ 13:53) (96% - 98%)  Wt(kg): --    LABS:                        11.0   5.87  )-----------( 218      ( 01 Oct 2022 06:45 )             35.3     10-01    141  |  108  |  29<H>  ----------------------------<  244<H>  4.7   |  27  |  1.10    Ca    9.1      01 Oct 2022 06:45    TPro  6.5  /  Alb  1.8<L>  /  TBili  0.1<L>  /  DBili  x   /  AST  34  /  ALT  36  /  AlkPhos  59  09-29    PT/INR - ( 01 Oct 2022 06:45 )   PT: 28.5 sec;   INR: 2.37 ratio         PTT - ( 30 Sep 2022 06:00 )  PTT:31.5 sec    CAPILLARY BLOOD GLUCOSE      POCT Blood Glucose.: 163 mg/dL (01 Oct 2022 13:38)  POCT Blood Glucose.: 213 mg/dL (01 Oct 2022 11:57)  POCT Blood Glucose.: 210 mg/dL (01 Oct 2022 08:00)  POCT Blood Glucose.: 189 mg/dL (30 Sep 2022 21:17)  POCT Blood Glucose.: 228 mg/dL (30 Sep 2022 16:39)        RADIOLOGY & ADDITIONAL TESTS:    Consultant(s) Notes Reviewed:  [x ] YES  [ ] NO    PHYSICAL EXAM:  GENERAL: well built, well nourished  HEAD:  Atraumatic, Normocephalic  EYES: EOMI, PERRLA, conjunctiva and sclera clear  ENT: No tonsillar erythema, exudates, or enlargement; Moist mucous membranes, Good dentition, No lesions  NECK: Supple, No JVD, Normal thyroid, no enlarged nodes  NERVOUS SYSTEM:  Alert & Oriented X3, Good concentration; Motor Strength 5/5 B/L upper and lower extremities; DTRs 2+ intact and symmetric, sensory intact  CHEST/LUNG: B/L good air entry; No rales, rhonchi, or wheezing  HEART: S1S2 normal, no S3, Regular rate and rhythm; No murmurs, rubs, or gallops  ABDOMEN: Soft, Nontender, Nondistended; Bowel sounds present  EXTREMITIES:  2+ Peripheral Pulses, No clubbing, cyanosis, right hand swelling /warm on touch  LYMPH: No lymphadenopathy noted  SKIN: No rashes or lesions    Care Discussed with Consultants/Other Providers [ x] YES  [ ] NO

## 2022-10-01 NOTE — PROGRESS NOTE ADULT - ASSESSMENT
87 yr old female, from NH, H/O dementia/HTN/HLd/.DM/DVT/OA shoulder, admit hospital for AMS/agitation/metabolic encephalopathy/CHARI/cellulitis right hand/septic joint(?), combative at NH refused eating refused meds, refused care, CT head no acute finding, COVID-19 swab negative , CHARI/hypernatremia, start IV hydration, F/U urine culture  negative ,  CXR no infiltrate, psych consult, fall precaution. DX AMS/agitation/behavior/metabolic encephalopathy/CHARI/hypernatremia. Still combative refused eat pulled IV line, refused meds refused care, Zyprexa IM prn for agitation, zyprexa 2.5 mg po HS, more calm, fall precaution,  COVID-19 negative. Advance care discussed, full code. Still  spiked fever  101F, pan culture, right hand/finger swelling/warm on touch,  septic vs inflammatory arthritis(?), pan culture, patient refused  CT scan  right hand, S/P IV  continue IV ABS, steroid,  9/26 exposure to COVID-19 , swab negative 9/26, patient improved, calm D/C planning po  ABS course per ID, taper steroid, fall precaution

## 2022-10-01 NOTE — PROGRESS NOTE ADULT - SUBJECTIVE AND OBJECTIVE BOX
Patient is seen and examined at the bed side, is afebrile. The right hand swelling, redness and tenderness improved significantly.       REVIEW OF SYSTEMS: All other review systems are negative      ALLERGIES: No Known Allergies      Vital Signs Last 24 Hrs  T(C): 36.7 (01 Oct 2022 13:53), Max: 36.9 (01 Oct 2022 04:25)  T(F): 98 (01 Oct 2022 13:53), Max: 98.5 (01 Oct 2022 04:25)  HR: 56 (01 Oct 2022 13:53) (56 - 75)  BP: 144/46 (01 Oct 2022 13:53) (118/50 - 144/46)  BP(mean): --  RR: 18 (01 Oct 2022 13:53) (18 - 18)  SpO2: 98% (01 Oct 2022 13:53) (96% - 98%)    Parameters below as of 01 Oct 2022 13:53  Patient On (Oxygen Delivery Method): room air      PHYSICAL EXAM:  GENERAL: Not in distress   CHEST/LUNG:  Not using accessory muscles   HEART: s1 and s2 present  ABDOMEN:  Nontender and  Nondistended  EXTREMITIES: Right hand redness, tenderness and swelling almost resolved  CNS: Awake and Alert      LABS:                                   11.0   5.87  )-----------( 218      ( 01 Oct 2022 06:45 )             35.3                12.0   6.58  )-----------( 203      ( 30 Sep 2022 06:00 )             37.9         10-01    141  |  108  |  29<H>  ----------------------------<  244<H>  4.7   |  27  |  1.10    Ca    9.1      01 Oct 2022 06:45      TPro  6.5  /  Alb  1.8<L>  /  TBili  0.1<L>  /  DBili  x   /  AST  34  /  ALT  36  /  AlkPhos  59  09-29 09-28    141  |  108  |  26<H>  ----------------------------<  163<H>  4.1   |  25  |  1.30    Ca    9.1      28 Sep 2022 06:25  Phos  2.6     09-27  Mg     1.8     09-27    TPro  7.0  /  Alb  2.2<L>  /  TBili  0.5  /  DBili  x   /  AST  21  /  ALT  25  /  AlkPhos  64  09-27  PT/INR - ( 27 Sep 2022 10:40 )   PT: 21.5 sec;   INR: 1.80 ratio          MEDICATIONS  (STANDING):    ascorbic acid 500 milliGRAM(s) Oral daily  ceFAZolin   IVPB 2000 milliGRAM(s) IV Intermittent every 8 hours  cholecalciferol 1000 Unit(s) Oral daily  glucagon  Injectable 1 milliGRAM(s) IntraMuscular once  insulin lispro (ADMELOG) corrective regimen sliding scale   SubCutaneous three times a day before meals  insulin lispro (ADMELOG) corrective regimen sliding scale   SubCutaneous at bedtime  lidocaine   4% Patch 1 Patch Transdermal daily  losartan 100 milliGRAM(s) Oral daily  NIFEdipine XL 30 milliGRAM(s) Oral daily  OLANZapine 2.5 milliGRAM(s) Oral at bedtime  polyethylene glycol 3350 17 Gram(s) Oral daily  predniSONE   Tablet 40 milliGRAM(s) Oral every 24 hours  senna 2 Tablet(s) Oral at bedtime  simvastatin 10 milliGRAM(s) Oral at bedtime  sodium chloride 0.9%. 1000 milliLiter(s) (75 mL/Hr) IV Continuous <Continuous>  traZODone 50 milliGRAM(s) Oral at bedtime  warfarin 3 milliGRAM(s) Oral once      RADIOLOGY & ADDITIONAL TESTS:    9/27/22: Xray Hand 2 Views, Bilateral (09.27.22 @ 18:48) >  Three views of each hand demonstrate mild to moderate degenerative change with no fracture, dislocation or radiographic soft tissue abnormality.   Evaluation of the right digits limited by flexion      9/22/22: CT Head No Cont (09.22.22 @ 03:50) No acute intracranial hemorrhage or mass effect.      9/21/22: Xray Chest 1 View- PORTABLE-Urgent (Xray Chest 1 View- PORTABLE-Urgent .) (09.21.22 @ 22:40) Heart magnified by technique.    There is a slightly increased interstitial pattern in the lungs compared to August 17, 2021. Advanced shoulder degeneration and mild right   tracheal deviation at thoracic inlet again noted.        MICROBIOLOGY DATA:    Culture - Blood in AM (09.28.22 @ 06:55)   Specimen Source: .Blood Blood   Culture Results: No growth to date.     Culture - Blood in AM (09.28.22 @ 06:25)   Specimen Source: .Blood Blood   Culture Results: No growth to date.     COVID-19 PCR . (09.26.22 @ 12:27)   COVID-19 PCR: NotDetec

## 2022-10-02 LAB
ANION GAP SERPL CALC-SCNC: 5 MMOL/L — SIGNIFICANT CHANGE UP (ref 5–17)
BUN SERPL-MCNC: 25 MG/DL — HIGH (ref 7–18)
CALCIUM SERPL-MCNC: 8.8 MG/DL — SIGNIFICANT CHANGE UP (ref 8.4–10.5)
CHLORIDE SERPL-SCNC: 107 MMOL/L — SIGNIFICANT CHANGE UP (ref 96–108)
CO2 SERPL-SCNC: 28 MMOL/L — SIGNIFICANT CHANGE UP (ref 22–31)
CREAT SERPL-MCNC: 0.91 MG/DL — SIGNIFICANT CHANGE UP (ref 0.5–1.3)
EGFR: 61 ML/MIN/1.73M2 — SIGNIFICANT CHANGE UP
GLUCOSE BLDC GLUCOMTR-MCNC: 126 MG/DL — HIGH (ref 70–99)
GLUCOSE BLDC GLUCOMTR-MCNC: 181 MG/DL — HIGH (ref 70–99)
GLUCOSE BLDC GLUCOMTR-MCNC: 199 MG/DL — HIGH (ref 70–99)
GLUCOSE BLDC GLUCOMTR-MCNC: 261 MG/DL — HIGH (ref 70–99)
GLUCOSE SERPL-MCNC: 219 MG/DL — HIGH (ref 70–99)
HCT VFR BLD CALC: 38.2 % — SIGNIFICANT CHANGE UP (ref 34.5–45)
HGB BLD-MCNC: 12.2 G/DL — SIGNIFICANT CHANGE UP (ref 11.5–15.5)
INR BLD: 2.72 RATIO — HIGH (ref 0.88–1.16)
MCHC RBC-ENTMCNC: 30 PG — SIGNIFICANT CHANGE UP (ref 27–34)
MCHC RBC-ENTMCNC: 31.9 GM/DL — LOW (ref 32–36)
MCV RBC AUTO: 93.9 FL — SIGNIFICANT CHANGE UP (ref 80–100)
NRBC # BLD: 0 /100 WBCS — SIGNIFICANT CHANGE UP (ref 0–0)
PLATELET # BLD AUTO: 239 K/UL — SIGNIFICANT CHANGE UP (ref 150–400)
POTASSIUM SERPL-MCNC: 4.9 MMOL/L — SIGNIFICANT CHANGE UP (ref 3.5–5.3)
POTASSIUM SERPL-SCNC: 4.9 MMOL/L — SIGNIFICANT CHANGE UP (ref 3.5–5.3)
PROTHROM AB SERPL-ACNC: 32.7 SEC — HIGH (ref 10.5–13.4)
RBC # BLD: 4.07 M/UL — SIGNIFICANT CHANGE UP (ref 3.8–5.2)
RBC # FLD: 12.6 % — SIGNIFICANT CHANGE UP (ref 10.3–14.5)
SODIUM SERPL-SCNC: 140 MMOL/L — SIGNIFICANT CHANGE UP (ref 135–145)
WBC # BLD: 6.19 K/UL — SIGNIFICANT CHANGE UP (ref 3.8–10.5)
WBC # FLD AUTO: 6.19 K/UL — SIGNIFICANT CHANGE UP (ref 3.8–10.5)

## 2022-10-02 RX ADMIN — Medication 30 MILLIGRAM(S): at 06:34

## 2022-10-02 RX ADMIN — Medication 1000 UNIT(S): at 12:03

## 2022-10-02 RX ADMIN — Medication 50 MILLIGRAM(S): at 21:47

## 2022-10-02 RX ADMIN — SIMVASTATIN 10 MILLIGRAM(S): 20 TABLET, FILM COATED ORAL at 21:47

## 2022-10-02 RX ADMIN — SENNA PLUS 2 TABLET(S): 8.6 TABLET ORAL at 21:48

## 2022-10-02 RX ADMIN — Medication 100 MILLIGRAM(S): at 14:28

## 2022-10-02 RX ADMIN — OLANZAPINE 2.5 MILLIGRAM(S): 15 TABLET, FILM COATED ORAL at 21:47

## 2022-10-02 RX ADMIN — Medication 1: at 08:21

## 2022-10-02 RX ADMIN — Medication 100 MILLIGRAM(S): at 06:31

## 2022-10-02 RX ADMIN — Medication 100 MILLIGRAM(S): at 21:47

## 2022-10-02 RX ADMIN — Medication 500 MILLIGRAM(S): at 12:04

## 2022-10-02 RX ADMIN — LOSARTAN POTASSIUM 100 MILLIGRAM(S): 100 TABLET, FILM COATED ORAL at 06:34

## 2022-10-02 RX ADMIN — Medication 1: at 12:03

## 2022-10-02 RX ADMIN — Medication 20 MILLIGRAM(S): at 18:12

## 2022-10-02 NOTE — PROGRESS NOTE ADULT - ASSESSMENT
Patient is a 87y old  Female with PMH of dementia ,  HTN, HLD, T2DM, chronic DVT, OA, restless leg syndrome, constipation, frequent UTIs, and insomnia who has been sent in to the ER on 9/22/22 from [place] at [facility] for evaluation of altered mental status. and agitation uncharacteristic of her baseline. In ED pt was administered 2.5mg haldol and 2.5mg olanzapine for agitation. The CTH negative for acute infarct, UA negative, and CXR WNL. On day 3 of hospitalization , 9/25/22, she start having fever but no blood cultures noted in the system, also developed Right hand swelling. Today, 9/27/22, The ID consult requested to assist with further evaluation and antibiotic management.     # Fever - R/O Infectious etiology - Blood cultures from 9/28 have NGTD  # Right hand cellulitis vs septic arthritis  ( Less likely)  vs Gout with superimposed infection - improving - patient refusing CT scan of Right hand   # Metabolic encephalopathy - Resolving     would recommend:    1. Continue to Taper off prednisone   2. May change Cefazolin to Oral Augmentin 875mg [id number] on discharge to continue until 10/5/22  X 3 more days  3. Keep Right hand elevated  5. Rheumatology outpatient follow up for further work up of Gout    - discharge plan as per Primary team    Attending Attestation:    Spent more than 35 minutes on total encounter, more than 50 % of the visit was spent counseling and/or coordinating care by the Attending physician.

## 2022-10-02 NOTE — PROGRESS NOTE ADULT - SUBJECTIVE AND OBJECTIVE BOX
Patient is seen and examined at the bed side, is afebrile. The right hand swelling, redness and tenderness almost resolved, she is able to extend her hand fully.       REVIEW OF SYSTEMS: All other review systems are negative      ALLERGIES: No Known Allergies      Vital Signs Last 24 Hrs  T(C): 36.9 (02 Oct 2022 14:01), Max: 37.2 (01 Oct 2022 20:10)  T(F): 98.4 (02 Oct 2022 14:01), Max: 98.9 (01 Oct 2022 20:10)  HR: 66 (02 Oct 2022 14:01) (66 - 85)  BP: 146/57 (02 Oct 2022 14:01) (146/57 - 173/67)  BP(mean): --  RR: 18 (02 Oct 2022 14:01) (18 - 18)  SpO2: 100% (02 Oct 2022 14:01) (96% - 100%)    Parameters below as of 02 Oct 2022 14:01  Patient On (Oxygen Delivery Method): room air      PHYSICAL EXAM:  GENERAL: Not in distress   CHEST/LUNG:  Not using accessory muscles   HEART: s1 and s2 present  ABDOMEN:  Nontender and  Nondistended  EXTREMITIES: Right hand redness  and swelling almost resolved, able to extend right hand fully   CNS: Awake and Alert      LABS:                                   12.2   6.19  )-----------( 239      ( 02 Oct 2022 05:50 )             38.2                         11.0   5.87  )-----------( 218      ( 01 Oct 2022 06:45 )             35.3       10-02    140  |  107  |  25<H>  ----------------------------<  219<H>  4.9   |  28  |  0.91    Ca    8.8      02 Oct 2022 05:50    TPro  6.5  /  Alb  1.8<L>  /  TBili  0.1<L>  /  DBili  x   /  AST  34  /  ALT  36  /  AlkPhos  59  09-29 09-28    141  |  108  |  26<H>  ----------------------------<  163<H>  4.1   |  25  |  1.30    Ca    9.1      28 Sep 2022 06:25  Phos  2.6     09-27  Mg     1.8     09-27    TPro  7.0  /  Alb  2.2<L>  /  TBili  0.5  /  DBili  x   /  AST  21  /  ALT  25  /  AlkPhos  64  09-27  PT/INR - ( 27 Sep 2022 10:40 )   PT: 21.5 sec;   INR: 1.80 ratio          MEDICATIONS  (STANDING):    ascorbic acid 500 milliGRAM(s) Oral daily  ceFAZolin   IVPB 2000 milliGRAM(s) IV Intermittent every 8 hours  cholecalciferol 1000 Unit(s) Oral daily  glucagon  Injectable 1 milliGRAM(s) IntraMuscular once  insulin lispro (ADMELOG) corrective regimen sliding scale   SubCutaneous three times a day before meals  insulin lispro (ADMELOG) corrective regimen sliding scale   SubCutaneous at bedtime  lidocaine   4% Patch 1 Patch Transdermal daily  losartan 100 milliGRAM(s) Oral daily  NIFEdipine XL 30 milliGRAM(s) Oral daily  OLANZapine 2.5 milliGRAM(s) Oral at bedtime  polyethylene glycol 3350 17 Gram(s) Oral daily  predniSONE   Tablet 20 milliGRAM(s) Oral every 24 hours  senna 2 Tablet(s) Oral at bedtime  simvastatin 10 milliGRAM(s) Oral at bedtime  sodium chloride 0.9%. 1000 milliLiter(s) (75 mL/Hr) IV Continuous <Continuous>  traZODone 50 milliGRAM(s) Oral at bedtime        RADIOLOGY & ADDITIONAL TESTS:    9/27/22: Xray Hand 2 Views, Bilateral (09.27.22 @ 18:48) >  Three views of each hand demonstrate mild to moderate degenerative change with no fracture, dislocation or radiographic soft tissue abnormality.   Evaluation of the right digits limited by flexion      9/22/22: CT Head No Cont (09.22.22 @ 03:50) No acute intracranial hemorrhage or mass effect.      9/21/22: Xray Chest 1 View- PORTABLE-Urgent (Xray Chest 1 View- PORTABLE-Urgent .) (09.21.22 @ 22:40) Heart magnified by technique.    There is a slightly increased interstitial pattern in the lungs compared to August 17, 2021. Advanced shoulder degeneration and mild right   tracheal deviation at thoracic inlet again noted.        MICROBIOLOGY DATA:    Culture - Blood in AM (09.28.22 @ 06:55)   Specimen Source: .Blood Blood   Culture Results: No growth to date.     Culture - Blood in AM (09.28.22 @ 06:25)   Specimen Source: .Blood Blood   Culture Results: No growth to date.     COVID-19 PCR . (09.26.22 @ 12:27)   COVID-19 PCR: NotDetec

## 2022-10-02 NOTE — PROGRESS NOTE ADULT - SUBJECTIVE AND OBJECTIVE BOX
Patient is a 87y old  Female who presents with a chief complaint of Altered Mental Status (01 Oct 2022 14:00)/metabolic encephalopathy/septic joint      INTERVAL HPI/OVERNIGHT EVENTS:  T(C): 36.3 (10-02-22 @ 05:35), Max: 37.2 (10-01-22 @ 20:10)  HR: 85 (10-02-22 @ 05:35) (56 - 85)  BP: 166/91 (10-02-22 @ 05:35) (144/46 - 173/67)  RR: 18 (10-02-22 @ 05:35) (18 - 18)  SpO2: 96% (10-02-22 @ 05:35) (96% - 100%)  Wt(kg): --    LABS:                        12.2   6.19  )-----------( 239      ( 02 Oct 2022 05:50 )             38.2     10-02    140  |  107  |  25<H>  ----------------------------<  219<H>  4.9   |  28  |  0.91    Ca    8.8      02 Oct 2022 05:50      PT/INR - ( 02 Oct 2022 05:50 )   PT: 32.7 sec;   INR: 2.72 ratio             CAPILLARY BLOOD GLUCOSE      POCT Blood Glucose.: 189 mg/dL (01 Oct 2022 20:48)  POCT Blood Glucose.: 118 mg/dL (01 Oct 2022 16:26)  POCT Blood Glucose.: 163 mg/dL (01 Oct 2022 13:38)  POCT Blood Glucose.: 213 mg/dL (01 Oct 2022 11:57)        RADIOLOGY & ADDITIONAL TESTS:    Consultant(s) Notes Reviewed:  [x ] YES  [ ] NO    PHYSICAL EXAM:  GENERAL: well built, well nourished  HEAD:  Atraumatic, Normocephalic  EYES: EOMI, PERRLA, conjunctiva and sclera clear  ENT: No tonsillar erythema, exudates, or enlargement; Moist mucous membranes, Good dentition, No lesions  NECK: Supple, No JVD, Normal thyroid, no enlarged nodes  NERVOUS SYSTEM:  calm right hand stiffness improved  CHEST/LUNG: B/L good air entry; No rales, rhonchi, or wheezing  HEART: S1S2 normal, no S3, Regular rate and rhythm; No murmurs, rubs, or gallops  ABDOMEN: Soft, Nontender, Nondistended; Bowel sounds present  EXTREMITIES:  2+ Peripheral Pulses, No clubbing, cyanosis, right hand swelling warm on touch improved  LYMPH: No lymphadenopathy noted  SKIN: No rashes or lesions    Care Discussed with Consultants/Other Providers [ x] YES  [ ] NO

## 2022-10-03 ENCOUNTER — TRANSCRIPTION ENCOUNTER (OUTPATIENT)
Age: 87
End: 2022-10-03

## 2022-10-03 VITALS
DIASTOLIC BLOOD PRESSURE: 75 MMHG | HEART RATE: 80 BPM | RESPIRATION RATE: 18 BRPM | TEMPERATURE: 97 F | OXYGEN SATURATION: 100 % | SYSTOLIC BLOOD PRESSURE: 160 MMHG

## 2022-10-03 LAB
ANION GAP SERPL CALC-SCNC: 5 MMOL/L — SIGNIFICANT CHANGE UP (ref 5–17)
BUN SERPL-MCNC: 24 MG/DL — HIGH (ref 7–18)
CALCIUM SERPL-MCNC: 9.2 MG/DL — SIGNIFICANT CHANGE UP (ref 8.4–10.5)
CHLORIDE SERPL-SCNC: 105 MMOL/L — SIGNIFICANT CHANGE UP (ref 96–108)
CO2 SERPL-SCNC: 30 MMOL/L — SIGNIFICANT CHANGE UP (ref 22–31)
CREAT SERPL-MCNC: 0.97 MG/DL — SIGNIFICANT CHANGE UP (ref 0.5–1.3)
CULTURE RESULTS: SIGNIFICANT CHANGE UP
CULTURE RESULTS: SIGNIFICANT CHANGE UP
EGFR: 57 ML/MIN/1.73M2 — LOW
GLUCOSE BLDC GLUCOMTR-MCNC: 172 MG/DL — HIGH (ref 70–99)
GLUCOSE BLDC GLUCOMTR-MCNC: 200 MG/DL — HIGH (ref 70–99)
GLUCOSE SERPL-MCNC: 209 MG/DL — HIGH (ref 70–99)
HCT VFR BLD CALC: 39.3 % — SIGNIFICANT CHANGE UP (ref 34.5–45)
HGB BLD-MCNC: 12.2 G/DL — SIGNIFICANT CHANGE UP (ref 11.5–15.5)
INR BLD: 1.8 RATIO — HIGH (ref 0.88–1.16)
MCHC RBC-ENTMCNC: 29.5 PG — SIGNIFICANT CHANGE UP (ref 27–34)
MCHC RBC-ENTMCNC: 31 GM/DL — LOW (ref 32–36)
MCV RBC AUTO: 95.2 FL — SIGNIFICANT CHANGE UP (ref 80–100)
NRBC # BLD: 0 /100 WBCS — SIGNIFICANT CHANGE UP (ref 0–0)
PLATELET # BLD AUTO: 261 K/UL — SIGNIFICANT CHANGE UP (ref 150–400)
POTASSIUM SERPL-MCNC: 4.5 MMOL/L — SIGNIFICANT CHANGE UP (ref 3.5–5.3)
POTASSIUM SERPL-SCNC: 4.5 MMOL/L — SIGNIFICANT CHANGE UP (ref 3.5–5.3)
PROTHROM AB SERPL-ACNC: 21.5 SEC — HIGH (ref 10.5–13.4)
RBC # BLD: 4.13 M/UL — SIGNIFICANT CHANGE UP (ref 3.8–5.2)
RBC # FLD: 13 % — SIGNIFICANT CHANGE UP (ref 10.3–14.5)
SARS-COV-2 RNA SPEC QL NAA+PROBE: SIGNIFICANT CHANGE UP
SODIUM SERPL-SCNC: 140 MMOL/L — SIGNIFICANT CHANGE UP (ref 135–145)
SPECIMEN SOURCE: SIGNIFICANT CHANGE UP
SPECIMEN SOURCE: SIGNIFICANT CHANGE UP
WBC # BLD: 8.56 K/UL — SIGNIFICANT CHANGE UP (ref 3.8–10.5)
WBC # FLD AUTO: 8.56 K/UL — SIGNIFICANT CHANGE UP (ref 3.8–10.5)

## 2022-10-03 RX ORDER — POLYETHYLENE GLYCOL 3350 17 G/17G
17 POWDER, FOR SOLUTION ORAL
Qty: 0 | Refills: 0 | DISCHARGE
Start: 2022-10-03

## 2022-10-03 RX ORDER — SIMVASTATIN 20 MG/1
1 TABLET, FILM COATED ORAL
Qty: 0 | Refills: 0 | DISCHARGE
Start: 2022-10-03

## 2022-10-03 RX ORDER — INSULIN LISPRO 100/ML
1 VIAL (ML) SUBCUTANEOUS
Qty: 0 | Refills: 0 | DISCHARGE
Start: 2022-10-03

## 2022-10-03 RX ORDER — OLANZAPINE 15 MG/1
1 TABLET, FILM COATED ORAL
Qty: 0 | Refills: 0 | DISCHARGE
Start: 2022-10-03

## 2022-10-03 RX ORDER — TRAZODONE HCL 50 MG
1 TABLET ORAL
Qty: 0 | Refills: 0 | DISCHARGE
Start: 2022-10-03

## 2022-10-03 RX ORDER — WARFARIN SODIUM 2.5 MG/1
3 TABLET ORAL ONCE
Refills: 0 | Status: COMPLETED | OUTPATIENT
Start: 2022-10-03 | End: 2022-10-03

## 2022-10-03 RX ORDER — WARFARIN SODIUM 2.5 MG/1
3 TABLET ORAL AT BEDTIME
Refills: 0 | Status: DISCONTINUED | OUTPATIENT
Start: 2022-10-03 | End: 2022-10-03

## 2022-10-03 RX ORDER — SENNA PLUS 8.6 MG/1
2 TABLET ORAL
Qty: 0 | Refills: 0 | DISCHARGE
Start: 2022-10-03

## 2022-10-03 RX ORDER — LIDOCAINE 4 G/100G
1 CREAM TOPICAL
Qty: 0 | Refills: 0 | DISCHARGE
Start: 2022-10-03

## 2022-10-03 RX ORDER — TRAZODONE HCL 50 MG
1 TABLET ORAL
Qty: 0 | Refills: 0 | DISCHARGE

## 2022-10-03 RX ADMIN — LIDOCAINE 1 PATCH: 4 CREAM TOPICAL at 11:33

## 2022-10-03 RX ADMIN — Medication 500 MILLIGRAM(S): at 11:33

## 2022-10-03 RX ADMIN — Medication 100 MILLIGRAM(S): at 05:36

## 2022-10-03 RX ADMIN — Medication 1: at 11:48

## 2022-10-03 RX ADMIN — WARFARIN SODIUM 3 MILLIGRAM(S): 2.5 TABLET ORAL at 11:33

## 2022-10-03 RX ADMIN — Medication 1000 UNIT(S): at 11:33

## 2022-10-03 RX ADMIN — Medication 1: at 08:03

## 2022-10-03 RX ADMIN — POLYETHYLENE GLYCOL 3350 17 GRAM(S): 17 POWDER, FOR SOLUTION ORAL at 11:33

## 2022-10-03 RX ADMIN — Medication 100 MILLIGRAM(S): at 13:20

## 2022-10-03 NOTE — PROGRESS NOTE ADULT - PROVIDER SPECIALTY LIST ADULT
Infectious Disease
Internal Medicine
Infectious Disease
Internal Medicine

## 2022-10-03 NOTE — PROGRESS NOTE ADULT - SUBJECTIVE AND OBJECTIVE BOX
Patient is a 87y old  Female who presents with a chief complaint of Altered Mental Status (02 Oct 2022 13:57)/metabolic encephaloapthy/CHARI/cellulitis right arm/septic joint(?), improved, D/C NH      INTERVAL HPI/OVERNIGHT EVENTS:  T(C): 36.6 (10-03-22 @ 05:15), Max: 37.2 (10-02-22 @ 20:32)  HR: 64 (10-03-22 @ 05:15) (64 - 71)  BP: 130/53 (10-03-22 @ 05:15) (130/53 - 146/57)  RR: 18 (10-03-22 @ 05:15) (18 - 20)  SpO2: 98% (10-03-22 @ 05:15) (97% - 100%)  Wt(kg): --    LABS:                        12.2   8.56  )-----------( 261      ( 03 Oct 2022 06:10 )             39.3     10-03    140  |  105  |  24<H>  ----------------------------<  209<H>  4.5   |  30  |  0.97    Ca    9.2      03 Oct 2022 06:10      PT/INR - ( 03 Oct 2022 06:10 )   PT: 21.5 sec;   INR: 1.80 ratio             CAPILLARY BLOOD GLUCOSE      POCT Blood Glucose.: 172 mg/dL (03 Oct 2022 07:48)  POCT Blood Glucose.: 261 mg/dL (02 Oct 2022 21:04)  POCT Blood Glucose.: 126 mg/dL (02 Oct 2022 16:47)  POCT Blood Glucose.: 181 mg/dL (02 Oct 2022 11:34)        RADIOLOGY & ADDITIONAL TESTS:    Consultant(s) Notes Reviewed:  [x ] YES  [ ] NO    PHYSICAL EXAM:  GENERAL: well built, well nourished  HEAD:  Atraumatic, Normocephalic  EYES: EOMI, PERRLA, conjunctiva and sclera clear  ENT: No tonsillar erythema, exudates, or enlargement; Moist mucous membranes, Good dentition, No lesions  NECK: Supple, No JVD, Normal thyroid, no enlarged nodes  NERVOUS SYSTEM:  Awake calm  CHEST/LUNG: B/L good air entry; No rales, rhonchi, or wheezing  HEART: S1S2 normal, no S3, Regular rate and rhythm; No murmurs, rubs, or gallops  ABDOMEN: Soft, Nontender, Nondistended; Bowel sounds present  EXTREMITIES:  2+ Peripheral Pulses, No clubbing, cyanosis, right hand swelling subsided, right elbow mild swelling  LYMPH: No lymphadenopathy noted  SKIN: No rashes or lesions    Care Discussed with Consultants/Other Providers [ x] YES  [ ] NO

## 2022-10-03 NOTE — PROGRESS NOTE ADULT - REASON FOR ADMISSION
Altered Mental Status
Altered Mental Status/agitation
Altered Mental Status/metabolic encephalopathy
Altered Mental Status
Altered Mental Status/agitation
Altered Mental Status

## 2022-10-03 NOTE — DISCHARGE NOTE NURSING/CASE MANAGEMENT/SOCIAL WORK - PATIENT PORTAL LINK FT
You can access the FollowMyHealth Patient Portal offered by [facility] by registering at the following website: [email or web address]. By joining [facility]’s FollowMyHealth portal, you will also be able to view your health information using other applications (apps) compatible with our system.

## 2022-10-03 NOTE — PROGRESS NOTE ADULT - ASSESSMENT
87 yr old female, from NH, H/O dementia/HTN/HLd/.DM/DVT/OA shoulder, admit hospital for AMS/agitation/metabolic encephalopathy/CHARI/cellulitis right hand/septic joint(?), combative at NH refused eating refused meds, refused care, CT head no acute finding, COVID-19 swab negative , CHARI/hypernatremia, start IV hydration, F/U urine culture  negative ,  CXR no infiltrate, psych consult, fall precaution. DX AMS/agitation/behavior/metabolic encephalopathy/CHARI/hypernatremia. Still combative refused eat pulled IV line, refused meds refused care, Zyprexa IM prn for agitation, zyprexa 2.5 mg po HS, more calm, fall precaution,  COVID-19 negative. Advance care discussed, full code. Still  spiked fever  101F, pan culture, right hand/finger swelling/warm on touch,  septic vs inflammatory arthritis(?), pan culture, patient refused  CT scan  right hand, S/P IV  continue IV ABS, steroid,  9/26 exposure to COVID-19 , swab negative 9/26, patient improved, calm D/C planning po  ABS course per ID, taper steroid, fall precaution, return NH

## 2022-10-03 NOTE — DISCHARGE NOTE NURSING/CASE MANAGEMENT/SOCIAL WORK - NSDCPEFALRISK_GEN_ALL_CORE
For information on Fall & Injury Prevention, visit: [email or web address] OR  [email or web address] OR  https://www.cdc.gov/steadi/patient.html

## 2022-10-03 NOTE — PROGRESS NOTE ADULT - NUTRITIONAL ASSESSMENT
This patient has been assessed with a concern for Malnutrition and has been determined to have a diagnosis/diagnoses of Severe protein-calorie malnutrition.    This patient is being managed with:   Diet Pureed-  DASH/TLC {Sodium & Cholesterol Restricted}  Supplement Feeding Modality:  Oral  Ensure Enlive Cans or Servings Per Day:  1       Frequency:  Three Times a day  Entered: Sep 24 2022  5:27PM    
This patient has been assessed with a concern for Malnutrition and has been determined to have a diagnosis/diagnoses of Severe protein-calorie malnutrition.    This patient is being managed with:   Diet Pureed-  DASH/TLC {Sodium & Cholesterol Restricted}  Supplement Feeding Modality:  Oral  Ensure Enlive Cans or Servings Per Day:  1       Frequency:  Three Times a day  Entered: Sep 24 2022  5:27PM    Diet Pureed-  DASH/TLC {Sodium & Cholesterol Restricted}  Entered: Sep 22 2022  2:52PM    The following pending diet order is being considered for treatment of Severe protein-calorie malnutrition:null
This patient has been assessed with a concern for Malnutrition and has been determined to have a diagnosis/diagnoses of Severe protein-calorie malnutrition.    This patient is being managed with:   Diet Pureed-  DASH/TLC {Sodium & Cholesterol Restricted}  Supplement Feeding Modality:  Oral  Ensure Enlive Cans or Servings Per Day:  1       Frequency:  Three Times a day  Entered: Sep 24 2022  5:27PM    Diet Pureed-  DASH/TLC {Sodium & Cholesterol Restricted}  Entered: Sep 22 2022  2:52PM    The following pending diet order is being considered for treatment of Severe protein-calorie malnutrition:null
This patient has been assessed with a concern for Malnutrition and has been determined to have a diagnosis/diagnoses of Severe protein-calorie malnutrition.    This patient is being managed with:   Diet Pureed-  DASH/TLC {Sodium & Cholesterol Restricted}  Supplement Feeding Modality:  Oral  Ensure Enlive Cans or Servings Per Day:  1       Frequency:  Three Times a day  Entered: Sep 24 2022  5:27PM    
This patient has been assessed with a concern for Malnutrition and has been determined to have a diagnosis/diagnoses of Severe protein-calorie malnutrition.    This patient is being managed with:   Diet Pureed-  DASH/TLC {Sodium & Cholesterol Restricted}  Supplement Feeding Modality:  Oral  Ensure Enlive Cans or Servings Per Day:  1       Frequency:  Three Times a day  Entered: Sep 24 2022  5:27PM    
This patient has been assessed with a concern for Malnutrition and has been determined to have a diagnosis/diagnoses of Severe protein-calorie malnutrition.    This patient is being managed with:   Diet Pureed-  DASH/TLC {Sodium & Cholesterol Restricted}  Supplement Feeding Modality:  Oral  Ensure Enlive Cans or Servings Per Day:  1       Frequency:  Three Times a day  Entered: Sep 24 2022  5:27PM    Diet Pureed-  DASH/TLC {Sodium & Cholesterol Restricted}  Entered: Sep 22 2022  2:52PM    The following pending diet order is being considered for treatment of Severe protein-calorie malnutrition:null
This patient has been assessed with a concern for Malnutrition and has been determined to have a diagnosis/diagnoses of Severe protein-calorie malnutrition.    This patient is being managed with:   Diet Pureed-  DASH/TLC {Sodium & Cholesterol Restricted}  Supplement Feeding Modality:  Oral  Ensure Enlive Cans or Servings Per Day:  1       Frequency:  Three Times a day  Entered: Sep 24 2022  5:27PM    
This patient has been assessed with a concern for Malnutrition and has been determined to have a diagnosis/diagnoses of Severe protein-calorie malnutrition.    This patient is being managed with:   Diet Pureed-  DASH/TLC {Sodium & Cholesterol Restricted}  Supplement Feeding Modality:  Oral  Ensure Enlive Cans or Servings Per Day:  1       Frequency:  Three Times a day  Entered: Sep 24 2022  5:27PM    Diet Pureed-  DASH/TLC {Sodium & Cholesterol Restricted}  Entered: Sep 22 2022  2:52PM    The following pending diet order is being considered for treatment of Severe protein-calorie malnutrition:null
This patient has been assessed with a concern for Malnutrition and has been determined to have a diagnosis/diagnoses of Severe protein-calorie malnutrition.    This patient is being managed with:   Diet Pureed-  DASH/TLC {Sodium & Cholesterol Restricted}  Supplement Feeding Modality:  Oral  Ensure Enlive Cans or Servings Per Day:  1       Frequency:  Three Times a day  Entered: Sep 24 2022  5:27PM    Diet Pureed-  DASH/TLC {Sodium & Cholesterol Restricted}  Entered: Sep 22 2022  2:52PM    The following pending diet order is being considered for treatment of Severe protein-calorie malnutrition:null
This patient has been assessed with a concern for Malnutrition and has been determined to have a diagnosis/diagnoses of Severe protein-calorie malnutrition.    This patient is being managed with:   Diet Pureed-  DASH/TLC {Sodium & Cholesterol Restricted}  Supplement Feeding Modality:  Oral  Ensure Enlive Cans or Servings Per Day:  1       Frequency:  Three Times a day  Entered: Sep 24 2022  5:27PM    

## 2022-10-24 NOTE — ED PROVIDER NOTE - NSTIMEPROVIDERCAREINITIATE_GEN_ER
Dixon \"Ryan\" Kelby  : 1987  Primary: Demi Harris  Secondary:  [address] [facility] [address]  [address] [place] [address] Way [address]  Phone: [phone]  Fax: [phone] Plan Frequency: 1-2 visits for HEP    Plan of Care/Certification Expiration Date: 22      PT Visit Info: Total # of Visits to Date: 1      Visit Count:  1                OUTPATIENT PHYSICAL THERAPY:             OP NOTE TYPE: Initial Assessment 10/24/2022               Episode  Appt Desk         Treatment Diagnosis:  Pain in Right Shoulder (M25.511)  Medical/Referring Diagnosis:  Injury of glenoid labrum, right, initial encounter [S49.91XA]  Right anterior shoulder pain [M25.511]  Referring Physician:  Prashant Sharma MD MD Orders:  PT Eval and Treat   Return MD Appt:  22  Date of Onset:  Onset Date: 22     Allergies:  Adhesive tape and Cat hair extract  Restrictions/Precautions:    Restrictions/Precautions: None       Medications Last Reviewed:  10/24/2022     SUBJECTIVE   History of Injury/Illness (Reason for Referral):  Pt reports he was vacuuming the stairs with a regular/heavy vacuum and lifting it up each step and had some pain in the R shoulder. He thought the pain would get better but it has gotten worse. Shoulder gets sore with simple tasks at home. Difficulty sleeping on the R side. Pain at rest 0/10, pain with movement 3/10. Occasionally sharp pain 5/10. Patient Stated Goal(s):  \"daily chores without pain\"  Initial:     0 /10 Post Session:     1 /10  Past Medical History/Comorbidities:   Mr. Apple Broussard  has a past medical history of Anxiety and Depression. Mr. Apple Broussard  has a past surgical history that includes Tonsillectomy ().   Social History/Living Environment:   Lives With: Spouse  Type of Home: House     Prior Level of Function/Work/Activity:   Prior level of function: Independent  Occupation: -- (not working)         Learning:   Does the patient/guardian have any barriers to learning?: No barriers  Will there be a co-learner?: No  What is the preferred language of the patient/guardian?: Chinese  Is an  required?: No  How does the patient/guardian prefer to learn new concepts?: Listening; Demonstration; Pictures/Videos     Fall Risk Scale: Urban Total Score: 0  Urban Fall Risk: Low (0-24)      OBJECTIVE   Observation/Orthostatic Postural Assessment:  Pt sits with slightly rounded shoulders. Palpation:  no tenderness with palpation   Shoulder:  AROM Shoulder (Degrees)  General AROM UE: Right WNL, Other (comment) (with pain at end range of motion on R UE)  Shoulder Elbow Strength Testing (MMT)  R Shoulder Flexion: 5/5  R Shoulder Internal Rotation: 5/5  R Shoulder External Rotation: 5/5  Shoulder Strength Testing (Muscle Specific)  R Middle Trapezius: 4+/5  R Lower Trapezius: 4+/5    Special Tests:  Impingement tests: Pain with Neers; Rotator Cuff tests: negative full can, negative subscap lift off (painful but strong); Labral test: negative Speeds, negative O'Cong, negative bicep load test  Neurological Screen: Upper quarter Neural Tension Tests: negative median nerve glide R UE  Functional Mobility:  Sit to/from Stand: independent. Bed Mobility: independent. Independent with basic mobility. Independent with dressing and grooming ADL's. Balance: n/t    ASSESSMENT   Initial Assessment:  Mr. Leonard Palmer presents with complaints of R shoulder pain after vacuuming the stairs. He presents with WNL R shoulder AROM with reports of pain at end ranges of motion. He presents with weakness in middle and lower trap. Instructed in HEP with rotator cuff and middle and lower trap strengthening exercises. He demonstrated each exercise and is to work on these exercises at home. He can return to PT if needed for 1 follow up session to review and update HEP. Problem List: (Impacting functional limitations):     Body Structures, Functions, Activity Limitations Requiring Skilled Therapeutic Intervention: Decreased strength; Increased pain     Therapy Prognosis:   Therapy Prognosis: Good     Assessment Complexity:   Low Complexity  PLAN   Effective Dates: 10-24-22 TO Plan of Care/Certification Expiration Date: 11/23/22     Frequency/Duration: Plan Frequency: 1-2 visits for HEP     Interventions Planned (Treatment may consist of any combination of the following):    Current Treatment Recommendations: Home exercise program; Strengthening; Manual Therapy - Soft Tissue Mobilization; Manual Therapy - Joint Manipulation; Modalities     Goals: (Goals have been discussed and agreed upon with patient.)    Discharge Goals: Time Frame: 1 visit  Pt will be independent with HEP. Outcome Measure: Tool Used: Disabilities of the Arm, Shoulder and Hand (DASH) Questionnaire - Quick Version  Score:  Initial: 22/55  Most Recent: X/55 (Date: -- )   Interpretation of Score: The DASH is designed to measure the activities of daily living in person's with upper extremity dysfunction or pain. Each section is scored on a 1-5 scale, 5 representing the greatest disability. The scores of each section are added together for a total score of 55. Medical Necessity:   > Patient demonstrates good rehab potential due to higher previous functional level. Reason For Services/Other Comments:  > Patient continues to require skilled intervention due to R shoulder pain and to review and update HEP. Total Duration: 40 minutes  Time In: 1110  Time Out: 1150    Regarding Dixon \"Ryan\" Kelby's therapy, I certify that the treatment plan above will be carried out by a therapist or under their direction. Thank you for this referral,  Patti Santos, PT     Referring Physician Signature: Shaq Guzman No Signature is Required for this note.         Post Session Pain  Charge Capture  PT Visit Info MD Guidelines  Kaitlin 17-Aug-2021 15:25

## 2022-11-08 PROCEDURE — 99285 EMERGENCY DEPT VISIT HI MDM: CPT | Mod: 25

## 2022-11-08 PROCEDURE — 71045 X-RAY EXAM CHEST 1 VIEW: CPT

## 2022-11-08 PROCEDURE — 85027 COMPLETE CBC AUTOMATED: CPT

## 2022-11-08 PROCEDURE — 85730 THROMBOPLASTIN TIME PARTIAL: CPT

## 2022-11-08 PROCEDURE — 80048 BASIC METABOLIC PNL TOTAL CA: CPT

## 2022-11-08 PROCEDURE — 82607 VITAMIN B-12: CPT

## 2022-11-08 PROCEDURE — 82746 ASSAY OF FOLIC ACID SERUM: CPT

## 2022-11-08 PROCEDURE — 84100 ASSAY OF PHOSPHORUS: CPT

## 2022-11-08 PROCEDURE — 85652 RBC SED RATE AUTOMATED: CPT

## 2022-11-08 PROCEDURE — 85025 COMPLETE CBC W/AUTO DIFF WBC: CPT

## 2022-11-08 PROCEDURE — 87637 SARSCOV2&INF A&B&RSV AMP PRB: CPT

## 2022-11-08 PROCEDURE — 86038 ANTINUCLEAR ANTIBODIES: CPT

## 2022-11-08 PROCEDURE — 93005 ELECTROCARDIOGRAM TRACING: CPT

## 2022-11-08 PROCEDURE — 82962 GLUCOSE BLOOD TEST: CPT

## 2022-11-08 PROCEDURE — 82306 VITAMIN D 25 HYDROXY: CPT

## 2022-11-08 PROCEDURE — 83921 ORGANIC ACID SINGLE QUANT: CPT

## 2022-11-08 PROCEDURE — 81001 URINALYSIS AUTO W/SCOPE: CPT

## 2022-11-08 PROCEDURE — 70450 CT HEAD/BRAIN W/O DYE: CPT | Mod: MA

## 2022-11-08 PROCEDURE — 85610 PROTHROMBIN TIME: CPT

## 2022-11-08 PROCEDURE — 80053 COMPREHEN METABOLIC PANEL: CPT

## 2022-11-08 PROCEDURE — 83090 ASSAY OF HOMOCYSTEINE: CPT

## 2022-11-08 PROCEDURE — 84550 ASSAY OF BLOOD/URIC ACID: CPT

## 2022-11-08 PROCEDURE — 87040 BLOOD CULTURE FOR BACTERIA: CPT

## 2022-11-08 PROCEDURE — 83036 HEMOGLOBIN GLYCOSYLATED A1C: CPT

## 2022-11-08 PROCEDURE — 96372 THER/PROPH/DIAG INJ SC/IM: CPT

## 2022-11-08 PROCEDURE — 36415 COLL VENOUS BLD VENIPUNCTURE: CPT

## 2022-11-08 PROCEDURE — 82652 VIT D 1 25-DIHYDROXY: CPT

## 2022-11-08 PROCEDURE — 73120 X-RAY EXAM OF HAND: CPT

## 2022-11-08 PROCEDURE — 87635 SARS-COV-2 COVID-19 AMP PRB: CPT

## 2022-11-08 PROCEDURE — 86140 C-REACTIVE PROTEIN: CPT

## 2022-11-08 PROCEDURE — 83735 ASSAY OF MAGNESIUM: CPT

## 2022-11-08 PROCEDURE — 87086 URINE CULTURE/COLONY COUNT: CPT

## 2022-11-08 PROCEDURE — 84443 ASSAY THYROID STIM HORMONE: CPT

## 2022-12-04 NOTE — PATIENT PROFILE ADULT - HEALTH LITERACY
History  Chief Complaint   Patient presents with   • Generalized Body Aches     Since thursday     65year old female HIV positive managed on Biktarvy reports viral levels are undetectable as well as a cardiac defibrilator placed prior presenting for evaluation of generalized body aches which began Thursday, 4 days ago  Patient reports sinus congestion, sinus pressure, intermittent nonproductive cough, fatigue and body aches  Patient reports he has been taking DayQuil and NyQuil which have been alleviating the symptoms however reports as this has been continuing she presented for evaluation to the emergency department  Patient denies any dyspnea, chest pain, syncope or near syncope  Patient reports an episode of vomiting yesterday denies any diarrhea  History provided by:  Patient   used: No    Generalized Body Aches  Location:  Generalized body  Quality:  Aching  Severity:  Moderate  Onset quality:  Gradual  Timing:  Constant  Progression:  Unchanged  Chronicity:  New  Context:  Nasal congestion and cough  Relieved by:  Over-the-counter medications  Worsened by:  None  Associated symptoms: congestion, cough, fatigue, rhinorrhea and sore throat    Associated symptoms: no abdominal pain, no chest pain, no ear pain, no fever, no nausea, no rash, no shortness of breath and no vomiting        Prior to Admission Medications   Prescriptions Last Dose Informant Patient Reported?  Taking?   aspirin (ECOTRIN LOW STRENGTH) 81 mg EC tablet   No No   Sig: Take 1 tablet (81 mg total) by mouth daily   atorvastatin (LIPITOR) 80 mg tablet   No No   Sig: Take 1 tablet (80 mg total) by mouth daily   bictegravir-emtricitab-tenofovir alafenamide (Biktarvy) -25 MG tablet   No No   Sig: Take 1 tablet by mouth daily with breakfast   bictegravir-emtricitab-tenofovir alafenamide (Biktarvy) -25 MG tablet   No No   Sig: Take 1 tablet by mouth daily with breakfast   carvedilol (COREG) 6 25 mg tablet   No No   Sig: Take 1 tablet (6 25 mg total) by mouth 2 (two) times a day with meals   nicotine polacrilex (COMMIT) 4 MG lozenge   No No   Sig: Apply 1 lozenge (4 mg total) to the mouth or throat as needed for smoking cessation   sacubitril-valsartan (Entresto) 49-51 MG TABS   No No   Sig: Take 1 tablet by mouth 2 (two) times a day   warfarin (COUMADIN) 1 mg tablet   No No   Sig: TAKE 1 TABLET BY MOUTH DAILY OR AS DIRECTED   warfarin (COUMADIN) 5 mg tablet   No No   Si to 1 5 tablets daily as directed by MD      Facility-Administered Medications: None       Past Medical History:   Diagnosis Date   • Asthma    • Coronary artery disease     defibrillator   • HIV positive ([address] )        Past Surgical History:   Procedure Laterality Date   • ANGIOPLASTY     • CARDIAC DEFIBRILLATOR PLACEMENT     • CORONARY ANGIOPLASTY      pci placement   • TOTAL ABDOMINAL HYSTERECTOMY     • US GUIDED INJECTION FOR RESEARCH STUDY  2018   • US GUIDED INJECTION FOR RESEARCH STUDY  2019   • US GUIDED INJECTION FOR RESEARCH STUDY  2018   • US GUIDED INJECTION FOR RESEARCH STUDY  2018       Family History   Problem Relation Age of Onset   • No Known Problems Mother    • Other Father         GI bleed   • No Known Problems Sister    • No Known Problems Sister    • No Known Problems Maternal Grandmother    • No Known Problems Maternal Grandfather    • No Known Problems Paternal Grandmother    • No Known Problems Paternal Grandfather    • Suicidality Brother    • Drug abuse Brother         overdose   • No Known Problems Maternal Aunt    • No Known Problems Maternal Aunt    • No Known Problems Maternal Aunt    • No Known Problems Paternal Aunt      I have reviewed and agree with the history as documented      E-Cigarette/Vaping   • E-Cigarette Use Never User      E-Cigarette/Vaping Substances     Social History     Tobacco Use   • Smoking status: Every Day     Packs/day: 1 00     Years: 30 00     Pack years: 30 00     Types: Cigarettes     Start date: 3/8/1994     Last attempt to quit: 2021     Years since quittin 4   • Smokeless tobacco: Former     Quit date: 2019   • Tobacco comments:     Pt smokes 5 cig daily   Vaping Use   • Vaping Use: Never used   Substance Use Topics   • Alcohol use: Not Currently     Comment: ocassionally   • Drug use: No       Review of Systems   Constitutional: Positive for chills and fatigue  Negative for fever  HENT: Positive for congestion, postnasal drip, rhinorrhea and sore throat  Negative for ear pain  Eyes: Negative for redness  Respiratory: Positive for cough  Negative for chest tightness and shortness of breath  Cardiovascular: Negative for chest pain and palpitations  Gastrointestinal: Negative for abdominal pain, nausea and vomiting  Genitourinary: Negative for dysuria and hematuria  Musculoskeletal: Negative  Skin: Negative for rash  Neurological: Negative for dizziness, syncope, light-headedness and numbness  Physical Exam  Physical Exam  Vitals and nursing note reviewed  Constitutional:       Appearance: She is well-developed and well-nourished  HENT:      Head: Normocephalic  Mouth/Throat:      Mouth: Mucous membranes are moist       Pharynx: No oropharyngeal exudate or posterior oropharyngeal erythema  Eyes:      General: No scleral icterus  Cardiovascular:      Rate and Rhythm: Normal rate and regular rhythm  Pulmonary:      Effort: Pulmonary effort is normal       Breath sounds: Normal breath sounds  No stridor  Comments:  Patient in no respiratory distress, speaking in full sentences, managing oral secretions without difficulty, no accessory muscle use, retractions, or belly breathing noted, no adventitious lung sounds auscultated bilaterally  Abdominal:      General: There is no distension  Palpations: Abdomen is soft  Tenderness: There is no abdominal tenderness     Musculoskeletal:         General: Normal range of motion  Skin:     General: Skin is warm and dry  Capillary Refill: Capillary refill takes less than 2 seconds  Neurological:      Mental Status: She is alert and oriented to person, place, and time  Psychiatric:         Mood and Affect: Mood and affect normal          Vital Signs  ED Triage Vitals [12/04/22 1632]   Temperature Pulse Respirations Blood Pressure SpO2   98 2 °F (36 8 °C) (!) 54 22 114/66 99 %      Temp Source Heart Rate Source Patient Position - Orthostatic VS BP Location FiO2 (%)   Oral Monitor Lying Left arm --      Pain Score       --           Vitals:    12/04/22 1632   BP: 114/66   Pulse: (!) 54   Patient Position - Orthostatic VS: Lying         Visual Acuity      ED Medications  Medications   acetaminophen (TYLENOL) tablet 650 mg (has no administration in time range)       Diagnostic Studies  Results Reviewed     Procedure Component Value Units Date/Time    COVID/FLU/RSV [[id number]] Collected: 12/04/22 1636    Lab Status: In process Specimen: Nares from Nose Updated: 12/04/22 1640                 No orders to display              Procedures  Procedures         ED Course                                             MDM  Number of Diagnoses or Management Options  Influenza-like illness  Diagnosis management comments: All imaging and/or lab testing discussed with patient, strict return to ED precautions discussed  Patient recommended to follow up promptly with appropriate outpatient provider  Patient and/or family members verbalizes understanding and agrees with plan  Patient is stable for discharge      Portions of the record may have been created with voice recognition software  Occasional wrong word or "sound a like" substitutions may have occurred due to the inherent limitations of voice recognition software  Read the chart carefully and recognize, using context, where substitutions have occurred          Disposition  Final diagnoses:   Influenza-like illness     Time reflects when diagnosis was documented in both MDM as applicable and the Disposition within this note     Time User Action Codes Description Comment    12/4/2022  5:03 PM Lisa Monet Stephanie [J11 1] Influenza-like illness       ED Disposition     ED Disposition   Discharge    Condition   Good    Date/Time   Sun Dec 4, 2022  5:03 PM    Comment   Guido Drew discharge to home/self care  Follow-up Information     Follow up With Specialties Details Why [address] [facility], [facility] Nurse Practitioner Schedule an appointment as soon as possible for a visit  As needed [id number] [facility] [id number] [phone]            Patient's Medications   Discharge Prescriptions    ACETAMINOPHEN (TYLENOL) 500 MG TABLET    Take 1 tablet (500 mg total) by mouth every 6 (six) hours as needed for mild pain       Start Date: 12/4/2022 End Date: --       Order Dose: 500 mg       Quantity: 30 tablet    Refills: 0    DEXTROMETHORPHAN-GUAIFENESIN (ROBITUSSIN DM)  MG/5 ML SYRUP    Take 5 mL by mouth 3 (three) times a day as needed (cough) for up to 10 days       Start Date: 12/4/2022 End Date: 12/14/2022       Order Dose: 5 mL       Quantity: 118 mL    Refills: 0    FLUTICASONE (FLONASE) 50 MCG/ACT NASAL SPRAY    1 spray into each nostril daily       Start Date: 12/4/2022 End Date: --       Order Dose: 1 spray       Quantity: 16 g    Refills: 0    MENTHOL-CETYLPYRIDINIUM (CEPACOL) 3 MG LOZENGE    Take 1 lozenge (3 mg total) by mouth as needed for sore throat       Start Date: 12/4/2022 End Date: --       Order Dose: 3 mg       Quantity: 30 lozenge    Refills: 0       No discharge procedures on file      PDMP Review     None          ED Provider  Electronically Signed by           Kade Guidry PA-C  12/04/22 5881 no

## 2023-06-15 NOTE — ED PROVIDER NOTE - ATTESTATION, MLM
Health Maintenance Due   Topic Date Due   • COVID-19 Vaccine (1) Never done   • Shingles Vaccine (1 of 2) Never done   • Hepatitis C Screening  Never done   • DTaP/Tdap/Td Vaccine (2 - Td or Tdap) 10/19/2022   • Medicare Advantage- Medicare Wellness Visit  Never done   • Depression Screening  09/06/2023       Patient is due for topics as listed above but is not proceeding with Immunization(s) COVID-19, Dtap/Tdap/Td and Shingles and Hepatitis C Screening at this time.    I have reviewed and confirmed nurses' notes for patient's medications, allergies, medical history, and surgical history.

## 2023-09-27 NOTE — PROGRESS NOTE ADULT - PROBLEM SELECTOR PLAN 9
[facility] - Outpatient Follow-up Note  Pool [place] 69 y.o. female MRN: [id number]  Encounter: [id number]          ASSESSMENT AND PLAN:   78-year-old female with history gastric bypass, GERD, hypothyroidism and diabetes who presents for follow-up evaluation. 1. Gastroesophageal reflux disease without esophagitis  2. Epigastric pain  She has history of chronic GERD and regurgitation symptoms underwent EGD in 2022 showing significant bile reflux into her dilated gastric pouch, no evidence of esophagitis. She underwent upper GI fluoroscopy exam showing small hiatal hernia, gastroesophageal reflux otherwise normal postoperative changes. She subsequently underwent manometry and pH testing demonstrating normal esophageal motility, overall normal acid exposure time consistent with hypersensitive reflux. She continues to follow with bariatrics who recommended no revision of her gastric bypass at this time and continued monitoring of her symptoms. I discussed that to complete her work-up I recommend obtaining gastric emptying study given her chronic narcotic use for evaluation of gastroparesis. She will continue dietary/lifestyle antireflux measures in addition to her twice daily PPI, H2 blocker therapy. We will transition her Carafate to the liquid solution given her gastric bypass anatomy to be used up to 4 times a day. If her gastric emptying study is normal consider resuming Questran given her significant bile reflux.    - NM gastric emptying; Future  - sucralfate (CARAFATE) 1 g/10 mL suspension; Take 10 mL (1 g total) by mouth 4 (four) times a day  Dispense: 414 mL; Refill: 0      Follow-up in 3 months  ______________________________________________________________________    SUBJECTIVE: 78-year-old female with history gastric bypass, GERD, hypothyroidism and diabetes who presents for follow-up evaluation. She was last seen in the GI office January 2023.   She has a history of chronic GERD and regurgitation symptoms and underwent EGD showing significant bile reflux into her dilated gastric pouch and no evidence of esophagitis. She then underwent upper GI fluoroscopy exam showing hernia, gastroesophageal reflux otherwise normal postoperative changes. Subsequent manometry and pH testing demonstrated normal esophageal motility and normal acid exposure time consistent with hypersensitive reflux. She was seen by bariatric surgery who recommended no revision of her gastric bypass due to no evidence of pathological reflux. At her last office visit she was recommended to start TCA with Pamelor 25 mg. She also has a history of chronic diarrhea related to bile salt diarrhea versus IBS. She did not tolerate Questran but uses Imodium capsules with good relief of her symptoms. Interval history: She continues to have acid reflux and bile reflux. She is taking pantoprazole twice daily and her Tagamet was Pepcid twice which she has not started. She is using Carafate as well which she thinks is helping her symptoms. She continues to have epigastric abdominal pain after eating. She reports only eating dinner and drinking ice tea and liquid during the day. She sleeps with her head elevated. Bowel movements can be formed but then progressed to looser stool. She uses hydrocodone for history of chronic pain. June 2022 colonoscopy showed multiple lipomas, 1 subcentimeter polyp. Pathology was unremarkable she was recommended no additional screening colonoscopies given her age     2014 colonoscopy showed 2 polyps, diverticulosis and hemorrhoids she was recommended to repeat in 5 years.  Pathology is not available for review  She reportedly had an EGD in 2014 which was normal, however procedure report is not available for review      REVIEW OF SYSTEMS IS OTHERWISE NEGATIVE.   10 point review of systems is negative other than stated as per HPI    Historical Information   Past Medical History:   Diagnosis Date   • Anemia    • Anxiety     hx of panic attacks (now under control)    • Arthritis    • Asthma     resolved (no problems in a decade)    • Baker's cyst of knee    • Bronchitis    • Bunion, left foot    • Cervical pain    • Chronic GERD    • Chronic pain    • Chronic pain disorder    • Depression    • Diabetes mellitus, type 2 ([address])    • Diabetic peripheral neuropathy ([address])    • Endometriosis    • Fibromyalgia    • Hyperlipidemia    • Hypertension    • Joint pain    • Low back pain    • Low back pain    • Lung nodules    • Macular degeneration    • Pulmonary emphysema ([address]) 01/16/2020   • Spondylosis    • Uterine cancer ([address])      Past Surgical History:   Procedure Laterality Date   • BACK SURGERY  1996    L5, S1- laser surgery fusion of c5 and c6    • BREAST CYST EXCISION Right    • CHOLECYSTECTOMY  2004   • FOOT SURGERY Left 2005    fusion    • FRACTURE SURGERY     • GASTRIC BYPASS  2010    Dr. Antonio Mcclure    • [address] [facility] [address]    just uterus    • KNEE ARTHROSCOPY     • LAMINECTOMY     • ORTHOPEDIC SURGERY     • OVARIAN CYST REMOVAL  1975   • PELVIC LAPAROSCOPY      ovaries (x10)    • PLEURAL SCARIFICATION  1967   • SHOULDER OPEN ROTATOR CUFF REPAIR Left 2008   • TONSILLECTOMY     • VAGINAL PROLAPSE REPAIR       Social History   Social History     Substance and Sexual Activity   Alcohol Use Not Currently   • Alcohol/week: 1.0 standard drink of alcohol   • Types: 1 Shots of liquor per week    Comment: rarely     Social History     Substance and Sexual Activity   Drug Use No     Social History     Tobacco Use   Smoking Status Every Day   • Packs/day: 0.25   • Years: 40.00   • Total pack years: 10.00   • Types: Cigarettes   Smokeless Tobacco Never     Family History   Problem Relation Age of Onset   • Hypertension Mother    • Lung cancer Mother    • Hypertension Father    • Heart disease Father    • Heart attack Father    • Cancer Father    • No Known Problems Sister    • No Known Problems Daughter    • No Known Problems Maternal Grandmother    • No Known Problems Maternal Grandfather    • No Known Problems Paternal Grandmother    • No Known Problems Paternal Grandfather    • Breast cancer Paternal Aunt 36   • Breast cancer Paternal Aunt 39   • Breast cancer Maternal Aunt 50       Meds/Allergies       Current Outpatient Medications:   •  albuterol (PROVENTIL HFA,VENTOLIN HFA) 90 mcg/act inhaler  •  amLODIPine (NORVASC) 5 mg tablet  •  baclofen 10 mg tablet  •  benzonatate (TESSALON PERLES) 100 mg capsule  •  Calcium Carb-Cholecalciferol (Caltrate 600+D3) 600-20 MG-MCG TABS  •  cetirizine (ZyrTEC) 10 mg tablet  •  [employer] Vitamin A 2400 MCG (8000 UT) capsule  •  Diclofenac Sodium (VOLTAREN) 1 %  •  diphenhydrAMINE-PSE-APAP (BENADRYL ALLERGY/COLD PO)  •  doxycycline hyclate (VIBRAMYCIN) 100 mg capsule  •  ergocalciferol (VITAMIN D2) 50,000 units  •  famotidine (PEPCID) 20 mg tablet  •  fluticasone-umeclidinium-vilanterol (Trelegy Ellipta) 100-62.5-25 mcg/actuation inhaler  •  HYDROcodone-acetaminophen (NORCO)  mg per tablet  •  ipratropium-albuterol (DUO-NEB) 0.5-2.5 mg/3 mL nebulizer solution  •  levothyroxine 25 mcg tablet  •  levothyroxine 50 mcg tablet  •  losartan (COZAAR) 25 mg tablet  •  nitrofurantoin (MACROBID) 100 mg capsule  •  nortriptyline (PAMELOR) 25 mg capsule  •  pantoprazole (PROTONIX) 40 mg tablet  •  Pediatric Multiple Vitamins (Childrens Chew Multivitamin) CHEW  •  pregabalin (LYRICA) 200 MG capsule  •  sucralfate (CARAFATE) 1 g tablet  •  traZODone (DESYREL) 50 mg tablet  •  vitamin B-12 (VITAMIN B-12) 1,000 mcg tablet  •  denosumab (PROLIA) 60 mg/mL  •  naloxone (NARCAN) 4 mg/0.1 mL nasal spray    Current Facility-Administered Medications:   •  cyanocobalamin injection 1,000 mcg, 1,000 mcg, Intramuscular, Q30 Days, 1,000 mcg at 12/08/20 1118  •  cyanocobalamin injection 1,000 mcg, 1,000 mcg, Intramuscular, Q30 Days, 1,000 mcg at 07/08/20 1036  •  cyanocobalamin injection 1,000 mcg, 1,000 mcg, Intramuscular, Q30 Days, 1,000 mcg at 09/08/20 1210  •  cyanocobalamin injection 1,000 mcg, 1,000 mcg, Intramuscular, Q30 Days  •  cyanocobalamin injection 1,000 mcg, 1,000 mcg, Intramuscular, Q30 Days  •  cyanocobalamin injection 1,000 mcg, 1,000 mcg, Intramuscular, Q30 Days, 1,000 mcg at 08/18/21 1019  •  cyanocobalamin injection 1,000 mcg, 1,000 mcg, Intramuscular, Q30 Days, 1,000 mcg at 03/21/23 1117  •  cyanocobalamin injection 1,000 mcg, 1,000 mcg, Intramuscular, Q30 Days, 1,000 mcg at 07/13/23 1004    Allergies   Allergen Reactions   • Cephalosporins Hives   • Erythromycin GI Intolerance   • Fentanyl Itching     Occurred during surgery    • Paxil [Paroxetine] Hives     After 2 weeks   • Penicillins Itching   • Pravastatin Myalgia   • Statins Itching   • Sulfa Antibiotics Diarrhea   • Wellbutrin [Bupropion] Hives     After 2 weeks    • Marzena's Wort Rash           Objective     Blood pressure 140/64, pulse 60, temperature 97.9 °F (36.6 °C), temperature source Tympanic, height 5' 5" (1.651 m), weight 81.6 kg (179 lb 14.4 oz), not currently breastfeeding. Body mass index is 29.94 kg/m². PHYSICAL EXAM:      General Appearance:   Alert, cooperative, no distress   HEENT:   Normocephalic, atraumatic, anicteric. Neck:  Supple, symmetrical, trachea midline   Lungs:   Clear to auscultation bilaterally; no rales, rhonchi or wheezing; respirations unlabored    Heart[de-identified]   Regular rate and rhythm; no murmur, rub, or gallop. Abdomen:   Soft, generalized abdominal tenderness to deep palpation without rebound or guarding, non-distended; normal bowel sounds; no masses, no organomegaly    Genitalia:   Deferred    Rectal:   Deferred    Extremities:  No cyanosis, clubbing or edema    Pulses:  2+ and symmetric    Skin:  No jaundice, rashes, or lesions    Lymph nodes:  No palpable cervical lymphadenopathy        Lab Results:   No visits with results within 1 Day(s) from this visit. 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 Hashimoto's thyroiditis with hypothyroidism. COMPARISON: Thyroid ultrasound 2/22/2022. TECHNIQUE:   Ultrasound of the thyroid was performed with a high frequency linear transducer in transverse and sagittal planes including volumetric imaging sweeps as well as traditional still imaging technique. FINDINGS:  Thyroid parenchyma is diffusely heterogeneous in echotexture and also hyperemic. Right lobe: 5.1 x 1.8 x 1.5 cm. Volume 6.5 mL Left lobe:  5.1 x 1.7 x 1.3 cm. Volume 5.3 mL Isthmus: 0.2  cm. Nodule #1. Image 5. RIGHT upper pole nodule measuring 0.8 x 0.4 x 0.6 cm. Unchanged from prior. COMPOSITION:  2 points, solid or almost completely solid . ECHOGENICITY:  1 point, hyperechoic or isoechoic. SHAPE:  0 points, wider-than-tall. MARGIN: 0 points, smooth. ECHOGENIC FOCI:  0 points, none or large comet-tail artifacts. TI-RADS Classification: TR 3 (3 points), FNA if >2.5 cm. Follow if >1.5 cm. Nodule #2. Image 35. LEFT upper pole nodule measuring 0.7 x 0.3 x 0.5 cm. Unchanged from prior. COMPOSITION:  2 points, solid or almost completely solid . ECHOGENICITY:  1 point, hyperechoic or isoechoic. SHAPE:  0 points, wider-than-tall. MARGIN: 0 points, smooth. ECHOGENIC FOCI:  0 points, none or large comet-tail artifacts. TI-RADS Classification: TR 3 (3 points), FNA if >2.5 cm. Follow if >1.5 cm. Nodule #3. Image 41. LEFT midgland nodule measuring 0.6 x 0.4 x 0.5 cm. Unchanged from prior. COMPOSITION:  2 points, solid or almost completely solid . ECHOGENICITY:  1 point, hyperechoic or isoechoic. SHAPE:  0 points, wider-than-tall. MARGIN: 0 points, smooth.  ECHOGENIC FOCI:  0 points, none or large comet-tail artifacts. TI-RADS Classification: TR 3 (3 points), FNA if >2.5 cm. Follow if >1.5 cm. Nodule #4. Image 48. LEFT lower pole nodule measuring 1.2 x 0.9 x 1.1 cm. Unchanged from prior. COMPOSITION:  2 points, solid or almost completely solid . ECHOGENICITY:  1 point, hyperechoic or isoechoic. SHAPE:  0 points, wider-than-tall. MARGIN: 0 points, smooth. ECHOGENIC FOCI:  0 points, none or large comet-tail artifacts. TI-RADS Classification: TR 3 (3 points), FNA if >2.5 cm. Follow if >1.5 cm. There are additional nodules of lesser size and/or TI-RADS score. These do not necessitate additional evaluation based on ACR criteria. Impression: Stable thyroid nodules as described, which do not meet current ACR criteria for requiring biopsy or followup ultrasounds. Reference: ACR Thyroid Imaging, Reporting and Data System (TI-RADS): White Paper of the [place]. J AM Sari Radiol 6470;29:207-795. (additional recommendations based on American Thyroid Association 2015 guidelines.) Resident: Shellie Cuenca, the attending radiologist, have reviewed the images and agree with the final report above.  Workstation performed: [id number] Home med Trazadone at bedtime  - C/w Trazadone

## 2023-10-19 NOTE — DISCHARGE NOTE NURSING/CASE MANAGEMENT/SOCIAL WORK - CAREGIVER PHONE NUMBER
Called pt. Pt. Informed me that she was getting a little better when she was on the Paxlovid.  Now pt. Feels like she is getting worse.  Coughing, sneezing and runny nose.  She is having a hard time sleeping. Her back hurts from coughing.  She is congested. She is not sure what she can take OTC.  Pt. Also took her blood pressure when she was on Paxlovid.  144/77, 126/58 and 112/61.     PATIENT CALLING IN TO LET DR BROWNING KNOW THAT HE HAS RUN OUT OF HIS ALBUTEROL, PATIENT STATES HE HAS 20 PUFFS LEFT ALBUTEROL SULFATE. PLEASE ADVISE, THANK YOU!   [phone] and [phone]

## 2023-12-21 NOTE — ED ADULT NURSE NOTE - [facility]SS[facility]CREENING[facility]_ED_ALL_ED
[GI Symptoms] : GI SYMPTOMS [Vomiting] : vomiting [de-identified] : vomiting  [FreeTextEntry6] : no fever no distress slight decrease in urine output no diarrhea no issues otherwise  Yes - the patient is able to be screened

## 2025-05-21 NOTE — PROGRESS NOTE ADULT - PROVIDER SPECIALTY LIST ADULT
Patient Transferred to: CICU  Handoff Report Given to: CICU RN
Podiatry
Internal Medicine

## 2025-07-14 NOTE — PROGRESS NOTE ADULT - SUBJECTIVE AND OBJECTIVE BOX
NP Note discussed with  Primary Attending    Patient is a 87y old  Female who presents with a chief complaint of Altered Mental Status/metabolic encephalopathy (29 Sep 2022 10:07)      INTERVAL HPI/OVERNIGHT EVENTS: no new complaints    MEDICATIONS  (STANDING):  ascorbic acid 500 milliGRAM(s) Oral daily  ceFAZolin   IVPB 2000 milliGRAM(s) IV Intermittent every 8 hours  cholecalciferol 1000 Unit(s) Oral daily  dextrose 5%. 1000 milliLiter(s) (50 mL/Hr) IV Continuous <Continuous>  dextrose 5%. 1000 milliLiter(s) (100 mL/Hr) IV Continuous <Continuous>  dextrose 50% Injectable 25 Gram(s) IV Push once  dextrose 50% Injectable 12.5 Gram(s) IV Push once  dextrose 50% Injectable 25 Gram(s) IV Push once  glucagon  Injectable 1 milliGRAM(s) IntraMuscular once  insulin lispro (ADMELOG) corrective regimen sliding scale   SubCutaneous three times a day before meals  lidocaine   4% Patch 1 Patch Transdermal daily  losartan 100 milliGRAM(s) Oral daily  NIFEdipine XL 30 milliGRAM(s) Oral daily  OLANZapine 2.5 milliGRAM(s) Oral at bedtime  polyethylene glycol 3350 17 Gram(s) Oral daily  predniSONE   Tablet 40 milliGRAM(s) Oral every 24 hours  senna 2 Tablet(s) Oral at bedtime  simvastatin 10 milliGRAM(s) Oral at bedtime  sodium chloride 0.9%. 1000 milliLiter(s) (75 mL/Hr) IV Continuous <Continuous>  traZODone 50 milliGRAM(s) Oral at bedtime    MEDICATIONS  (PRN):  acetaminophen     Tablet .. 650 milliGRAM(s) Oral every 6 hours PRN Temp greater or equal to 38C (100.4F), Mild Pain (1 - 3), Moderate Pain (4 - 6)  dextrose Oral Gel 15 Gram(s) Oral once PRN Blood Glucose LESS THAN 70 milliGRAM(s)/deciliter  OLANZapine 2.5 milliGRAM(s) Oral every 8 hours PRN Anxiety/agitation      __________________________________________________  REVIEW OF SYSTEMS:    CONSTITUTIONAL: No fever,   EYES: no acute visual disturbances  NECK: No pain or stiffness  RESPIRATORY: No cough; No shortness of breath  CARDIOVASCULAR: No chest pain, no palpitations  GASTROINTESTINAL: No pain. No nausea or vomiting; No diarrhea   NEUROLOGICAL: No headache or numbness, no tremors  MUSCULOSKELETAL: No joint pain, no muscle pain  GENITOURINARY: no dysuria, no frequency, no hesitancy  PSYCHIATRY: no depression , no anxiety  ALL OTHER  ROS negative        Vital Signs Last 24 Hrs  T(C): 36.7 (29 Sep 2022 05:54), Max: 36.9 (28 Sep 2022 13:50)  T(F): 98.1 (29 Sep 2022 05:54), Max: 98.5 (28 Sep 2022 13:50)  HR: 61 (29 Sep 2022 05:54) (61 - 82)  BP: 118/53 (29 Sep 2022 05:54) (107/59 - 129/67)  BP(mean): 70 (29 Sep 2022 05:54) (70 - 70)  RR: 17 (29 Sep 2022 05:54) (17 - 18)  SpO2: 94% (29 Sep 2022 05:54) (94% - 100%)    Parameters below as of 29 Sep 2022 05:54  Patient On (Oxygen Delivery Method): room air        ________________________________________________  PHYSICAL EXAM:   GENERAL: NAD  HEENT: Normocephalic;  conjunctivae and sclerae clear; moist mucous membranes;   NECK : supple  CHEST/LUNG: Clear to auscultation bilaterally with good air entry   HEART: S1 S2  regular; no murmurs, gallops or rubs  ABDOMEN: Soft, Nontender, Nondistended; Bowel sounds present  EXTREMITIES: no cyanosis; no edema; no calf tenderness  SKIN: warm and dry; no rash  NERVOUS SYSTEM:  Awake and alert; Oriented  to place, person and time ; no new deficits    _________________________________________________  LABS:                        11.6   6.52  )-----------( 175      ( 29 Sep 2022 06:57 )             36.5     09-29    144  |  114<H>  |  33<H>  ----------------------------<  203<H>  4.5   |  26  |  1.10    Ca    8.4      29 Sep 2022 10:15      PT/INR - ( 29 Sep 2022 06:57 )   PT: 18.5 sec;   INR: 1.55 ratio             CAPILLARY BLOOD GLUCOSE      POCT Blood Glucose.: 237 mg/dL (29 Sep 2022 11:16)  POCT Blood Glucose.: 220 mg/dL (29 Sep 2022 07:50)  POCT Blood Glucose.: 197 mg/dL (28 Sep 2022 21:43)  POCT Blood Glucose.: 124 mg/dL (28 Sep 2022 17:06)        RADIOLOGY & ADDITIONAL TESTS:    Imaging Personally Reviewed:  YES/NO    Consultant(s) Notes Reviewed:   YES/ No    Care Discussed with Consultants :     Plan of care was discussed with patient and /or primary care giver; all questions and concerns were addressed and care was aligned with patient's wishes.     NP Note discussed with  Primary Attending    Patient is a 87y old  Female who presents with a chief complaint of Altered Mental Status/metabolic encephalopathy (29 Sep 2022 10:07)      INTERVAL HPI/OVERNIGHT EVENTS: no new complaints    MEDICATIONS  (STANDING):  ascorbic acid 500 milliGRAM(s) Oral daily  ceFAZolin   IVPB 2000 milliGRAM(s) IV Intermittent every 8 hours  cholecalciferol 1000 Unit(s) Oral daily  dextrose 5%. 1000 milliLiter(s) (50 mL/Hr) IV Continuous <Continuous>  dextrose 5%. 1000 milliLiter(s) (100 mL/Hr) IV Continuous <Continuous>  dextrose 50% Injectable 25 Gram(s) IV Push once  dextrose 50% Injectable 12.5 Gram(s) IV Push once  dextrose 50% Injectable 25 Gram(s) IV Push once  glucagon  Injectable 1 milliGRAM(s) IntraMuscular once  insulin lispro (ADMELOG) corrective regimen sliding scale   SubCutaneous three times a day before meals  lidocaine   4% Patch 1 Patch Transdermal daily  losartan 100 milliGRAM(s) Oral daily  NIFEdipine XL 30 milliGRAM(s) Oral daily  OLANZapine 2.5 milliGRAM(s) Oral at bedtime  polyethylene glycol 3350 17 Gram(s) Oral daily  predniSONE   Tablet 40 milliGRAM(s) Oral every 24 hours  senna 2 Tablet(s) Oral at bedtime  simvastatin 10 milliGRAM(s) Oral at bedtime  sodium chloride 0.9%. 1000 milliLiter(s) (75 mL/Hr) IV Continuous <Continuous>  traZODone 50 milliGRAM(s) Oral at bedtime    MEDICATIONS  (PRN):  acetaminophen     Tablet .. 650 milliGRAM(s) Oral every 6 hours PRN Temp greater or equal to 38C (100.4F), Mild Pain (1 - 3), Moderate Pain (4 - 6)  dextrose Oral Gel 15 Gram(s) Oral once PRN Blood Glucose LESS THAN 70 milliGRAM(s)/deciliter  OLANZapine 2.5 milliGRAM(s) Oral every 8 hours PRN Anxiety/agitation      __________________________________________________  REVIEW OF SYSTEMS:    CONSTITUTIONAL: No fever,   EYES: no acute visual disturbances  NECK: No pain or stiffness  RESPIRATORY: No cough; No shortness of breath  CARDIOVASCULAR: No chest pain, no palpitations  GASTROINTESTINAL: No pain. No nausea or vomiting; No diarrhea   NEUROLOGICAL: No headache or numbness, no tremors  MUSCULOSKELETAL: No joint pain, no muscle pain  GENITOURINARY: no dysuria, no frequency, no hesitancy  PSYCHIATRY: no depression , no anxiety  ALL OTHER  ROS negative        Vital Signs Last 24 Hrs  T(C): 36.7 (29 Sep 2022 05:54), Max: 36.9 (28 Sep 2022 13:50)  T(F): 98.1 (29 Sep 2022 05:54), Max: 98.5 (28 Sep 2022 13:50)  HR: 61 (29 Sep 2022 05:54) (61 - 82)  BP: 118/53 (29 Sep 2022 05:54) (107/59 - 129/67)  BP(mean): 70 (29 Sep 2022 05:54) (70 - 70)  RR: 17 (29 Sep 2022 05:54) (17 - 18)  SpO2: 94% (29 Sep 2022 05:54) (94% - 100%)    Parameters below as of 29 Sep 2022 05:54  Patient On (Oxygen Delivery Method): room air        ________________________________________________  PHYSICAL EXAM: Well developed  GENERAL: NAD  HEENT: Normocephalic;  conjunctivae and sclerae clear; moist mucous membranes;   NECK : supple  CHEST/LUNG: Clear to auscultation bilaterally with good air entry   HEART: S1 S2  regular; no murmurs, gallops or rubs  ABDOMEN: Soft, Nontender, Nondistended; Bowel sounds present  EXTREMITIES: B/L hands swollen R>L, no cyanosis;  no calf tenderness  SKIN: warm and dry; no rash  NERVOUS SYSTEM:  Awake and alert; Oriented  to place, person and time ; no new deficits    _________________________________________________  LABS:                        11.6   6.52  )-----------( 175      ( 29 Sep 2022 06:57 )             36.5     09-29    144  |  114<H>  |  33<H>  ----------------------------<  203<H>  4.5   |  26  |  1.10    Ca    8.4      29 Sep 2022 10:15      PT/INR - ( 29 Sep 2022 06:57 )   PT: 18.5 sec;   INR: 1.55 ratio       CAPILLARY BLOOD GLUCOSE      POCT Blood Glucose.: 237 mg/dL (29 Sep 2022 11:16)  POCT Blood Glucose.: 220 mg/dL (29 Sep 2022 07:50)  POCT Blood Glucose.: 197 mg/dL (28 Sep 2022 21:43)  POCT Blood Glucose.: 124 mg/dL (28 Sep 2022 17:06)    RADIOLOGY & ADDITIONAL TESTS:  < from: Xray Hand 2 Views, Bilateral (09.27.22 @ 18:48) >  ACC: [id number] EXAM:  XR HAND 2 VIEWS BI                          PROCEDURE DATE:  09/27/2022          INTERPRETATION:  Clinical history: 87-year-old female, hand swelling.    Three views of each hand demonstrate mild to moderate degenerative change  with no fracture, dislocation or radiographic soft tissue abnormality.   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 Female. AMS, combative.    COMPARISON: 8/17/2021.    TECHNIQUE: Noncontrast axial CT head was obtained from the skull base to   vertex.    FINDINGS:  No acute intracranial hemorrhage, mass effect or midline shift.  No CT evidence of acute large vascular territory infarct.  The ventricles and cortical sulci are within normal limits forage.  Scattered hypodensities in the periventricular white matter are   nonspecific, but likely sequela of small vessel ischemic disease.    The paranasal sinuses and mastoid air cells are well aerated. The native   ocular lenses are surgically absent.  No displaced calvarial fracture.    IMPRESSION:  No acute intracranial hemorrhage or mass effect.    < end of copied text >      Imaging Personally Reviewed:  YES/NO    Consultant(s) Notes Reviewed:   YES/ No    Care Discussed with Consultants :     Plan of care was discussed with patient and /or primary care giver; all questions and concerns were addressed and care was aligned with patient's wishes.     [Negative] : Heme/Lymph